# Patient Record
Sex: MALE | Race: WHITE | Employment: UNEMPLOYED | URBAN - METROPOLITAN AREA
[De-identification: names, ages, dates, MRNs, and addresses within clinical notes are randomized per-mention and may not be internally consistent; named-entity substitution may affect disease eponyms.]

---

## 2016-09-09 LAB — COLONOSCOPY, EXTERNAL: NORMAL

## 2017-01-06 DIAGNOSIS — I10 ESSENTIAL HYPERTENSION, BENIGN: ICD-10-CM

## 2017-01-06 RX ORDER — FUROSEMIDE 20 MG/1
20 TABLET ORAL DAILY
Qty: 90 TAB | Refills: 1 | Status: SHIPPED | OUTPATIENT
Start: 2017-01-06 | End: 2017-07-08 | Stop reason: SDUPTHER

## 2017-01-27 ENCOUNTER — HOSPITAL ENCOUNTER (EMERGENCY)
Age: 52
Discharge: HOME | End: 2017-01-27
Attending: EMERGENCY MEDICINE
Payer: MEDICARE

## 2017-01-27 ENCOUNTER — APPOINTMENT (OUTPATIENT)
Dept: CT IMAGING | Age: 52
End: 2017-01-27
Attending: EMERGENCY MEDICINE
Payer: MEDICARE

## 2017-01-27 VITALS
DIASTOLIC BLOOD PRESSURE: 89 MMHG | OXYGEN SATURATION: 98 % | BODY MASS INDEX: 24.88 KG/M2 | TEMPERATURE: 97.7 F | HEART RATE: 78 BPM | WEIGHT: 168 LBS | SYSTOLIC BLOOD PRESSURE: 133 MMHG | RESPIRATION RATE: 16 BRPM | HEIGHT: 69 IN

## 2017-01-27 DIAGNOSIS — M54.2 NECK PAIN: Primary | ICD-10-CM

## 2017-01-27 PROCEDURE — 72125 CT NECK SPINE W/O DYE: CPT

## 2017-01-27 PROCEDURE — 99283 EMERGENCY DEPT VISIT LOW MDM: CPT

## 2017-01-27 RX ORDER — HYDROCODONE BITARTRATE AND ACETAMINOPHEN 5; 325 MG/1; MG/1
1 TABLET ORAL
Qty: 20 TAB | Refills: 0 | Status: SHIPPED | OUTPATIENT
Start: 2017-01-27 | End: 2017-08-10 | Stop reason: ALTCHOICE

## 2017-01-27 RX ORDER — PREDNISONE 10 MG/1
TABLET ORAL
Qty: 21 TAB | Refills: 0 | Status: SHIPPED | OUTPATIENT
Start: 2017-01-27 | End: 2017-05-05 | Stop reason: ALTCHOICE

## 2017-01-27 RX ORDER — DULOXETIN HYDROCHLORIDE 30 MG/1
30 CAPSULE, DELAYED RELEASE ORAL
COMMUNITY
End: 2017-01-27

## 2017-01-27 RX ORDER — QUETIAPINE FUMARATE 300 MG/1
600 TABLET, FILM COATED ORAL
COMMUNITY

## 2017-01-27 RX ORDER — PRAVASTATIN SODIUM 40 MG/1
40 TABLET ORAL
COMMUNITY
End: 2018-02-05 | Stop reason: SDUPTHER

## 2017-01-27 RX ORDER — QUETIAPINE FUMARATE 200 MG/1
200 TABLET, FILM COATED ORAL
COMMUNITY

## 2017-01-27 NOTE — ED TRIAGE NOTES
Sitting in bed and herd a \"crunch\" and now says all his muscles are \"buldging\". No loss of bladder or bowel control.

## 2017-01-27 NOTE — DISCHARGE INSTRUCTIONS
We hope that we have addressed all of your medical concerns. The examination and treatment you received in the Emergency Department were for an emergent problem and were not intended as complete care. It is important that you follow up with your healthcare provider(s) for ongoing care. If your symptoms worsen or do not improve as expected, and you are unable to reach your usual health care provider(s), you should return to the Emergency Department. Today's healthcare is undergoing tremendous change, and patient satisfaction surveys are one of the many tools to assess the quality of medical care. You may receive a survey from the [employer] regarding your experience in the Emergency Department. I hope that your experience has been completely positive, particularly the medical care that I provided. As such, please participate in the survey; anything less than excellent does not meet my expectations or intentions. [facility] and [address] [place] [address] participate in nationally recognized quality of care measures. If your blood pressure is greater than 120/80, as reported below, we urge that you seek medical care to address the potential of high blood pressure, commonly known as hypertension. Hypertension can be hereditary or can be caused by certain medical conditions, pain, stress, or \"white coat syndrome. \"       Please make an appointment with your health care provider(s) for follow up of your Emergency Department visit. VITALS:   Patient Vitals for the past 8 hrs:   Temp Pulse Resp BP SpO2   01/27/17 1152 97.7 °F (36.5 °C) 79 16 104/76 100 %          Thank you for allowing us to provide you with medical care today. We realize that you have many choices for your emergency care needs. Please choose us in the future for any continued health care needs. Peri Alanis Laughter, [address] Office: [phone]            No results found for this or any previous visit (from the past 24 hour(s)). Ct Spine Cerv Wo Cont    Result Date: 1/27/2017  EXAM:  CT CERVICAL SPINE WITHOUT CONTRAST INDICATION:   acute neck pain with hx of neck surgery. COMPARISON: None. TECHNIQUE: Multislice helical CT of the cervical spine was performed without intravenous contrast administration. Sagittal and coronal reconstructions were generated. CT dose reduction was achieved through use of a standardized protocol tailored for this examination and automatic exposure control for dose modulation. CONTRAST: None. FINDINGS: The alignment is within normal limits. There is no fracture or subluxation. The odontoid process is intact. The craniocervical junction is within normal limits. The prevertebral soft tissues are within normal limits. Surgical fusion of C5 and C6 is noted. Spondylosis is noted at C4-5 and C6-7. No spinal stenosis. IMPRESSION: Spondylitic and postoperative changes without acute abnormality.

## 2017-01-27 NOTE — ED PROVIDER NOTES
HPI Comments: 46 y.o. male with past medical history significant for reflux sympathetic dystrophy, seizures. Chronic pain, MI, cervical spondylosis, chronic pain, CAD, HTN, major depression and anxiety who presents from home with chief complaint of neck pain. Per pt, her was sitting in bed last night (1/27/2017) when he heard a loud, \"crunching sound\" in his neck which followed with moderate severe pain. He notes having a hx of a ruptured C3 disk as well as a pinched nerve to his neck  following an accident which occurred in [address] [place] [address] while he was working on his car. While in the ED the pt rates his pain 9/10. He adds that he has noticed his bilateral UE as well LE appear more swollen per usual. The pt denies any recent injury to his neck or GLF. There are no other acute medical concerns at this time. Social hx: Current smoker, No ETOH use    PCP: Nickolas You MD    Note written by Tyrone Albarran, as dictated by Nia Wan MD 12:08 PM        The history is provided by the patient.         Past Medical History:   Diagnosis Date    CAD (coronary artery disease) 2003     MI    Cervical spondylosis 6/26/2014    Chronic pain     Chronic pain      Left Shoulder pain    Hypertension     MI (myocardial infarction) [facility])      age 38    Other ill-defined conditions(799.89)      reflux sympathetic dystrophy    Psychiatric disorder      Major Depression/Anxiety    Psychotic disorder     Seizures ([address]        Past Surgical History:   Procedure Laterality Date    Hx orthopaedic       replacement of C3    Hx orthopaedic       C-Spine herniated disc repair (C3)    Colonoscopy N/A 9/9/2016     COLONOSCOPY performed by Juan J Cummins MD at [facility] ENDOSCOPY         Family History:   Problem Relation Age of Onset    Hypertension Mother     Heart Disease Mother     Stroke Mother        Social History     Social History    Marital status:      Spouse name: N/A    Number of children: N/A    Years of education: N/A     Occupational History    Not on file. Social History Main Topics    Smoking status: Current Every Day Smoker     Packs/day: 1.00    Smokeless tobacco: Not on file    Alcohol use No    Drug use: No    Sexual activity: Not on file     Other Topics Concern    Not on file     Social History Narrative    ** Merged History Encounter **              ALLERGIES: Bee sting [sting, bee]; Demerol [meperidine]; Bee sting [sting, bee]; and Demerol [meperidine]    Review of Systems   Musculoskeletal: Positive for neck pain. Bilateral UE and LE swelling       Vitals:    01/27/17 1152   BP: 104/76   Pulse: 79   Resp: 16   Temp: 97.7 °F (36.5 °C)   SpO2: 100%   Weight: 76.2 kg (168 lb)   Height: 5' 9\" (1.753 m)            Physical Exam   Constitutional: He is oriented to person, place, and time. He appears well-developed and well-nourished. No distress. HENT:   Head: Normocephalic and atraumatic. Mouth/Throat: Oropharynx is clear and moist.   Eyes: Conjunctivae and EOM are normal. Pupils are equal, round, and reactive to light. Neck: Muscular tenderness present. Decreased range of motion present. No erythema present. Cardiovascular: Normal rate, regular rhythm, normal heart sounds and intact distal pulses. No murmur heard. Pulmonary/Chest: Effort normal and breath sounds normal. No stridor. No respiratory distress. Abdominal: Soft. Bowel sounds are normal. There is no tenderness. Musculoskeletal: He exhibits no edema or tenderness. Neurological: He is alert and oriented to person, place, and time. No cranial nerve deficit. Skin: Skin is warm and dry. He is not diaphoretic. Psychiatric: He has a normal mood and affect. Nursing note and vitals reviewed.        MDM  Number of Diagnoses or Management Options  Neck pain:   Diagnosis management comments: Patient with neck pain s/p minor injury - with hx of minor neck injury requiring surgery for disc injury, check CT of cervical spine for acute process. CT neg for acute process - refer to PCP for further eval and care       Amount and/or Complexity of Data Reviewed  Tests in the radiology section of CPT®: reviewed and ordered      ED Course       Procedures      Final result (Exam End: 1/27/2017  1:19 PM) Open        Study Result      EXAM: CT CERVICAL SPINE WITHOUT CONTRAST     INDICATION: acute neck pain with hx of neck surgery.     COMPARISON: None.     TECHNIQUE: Multislice helical CT of the cervical spine was performed without  intravenous contrast administration. Sagittal and coronal reconstructions were  generated. CT dose reduction was achieved through use of a standardized  protocol tailored for this examination and automatic exposure control for dose  modulation.      CONTRAST: None.     FINDINGS:     The alignment is within normal limits. There is no fracture or subluxation. The  odontoid process is intact. The craniocervical junction is within normal limits. The prevertebral soft tissues are within normal limits. Surgical fusion of C5  and C6 is noted. Spondylosis is noted at C4-5 and C6-7.  No spinal stenosis.     IMPRESSION  IMPRESSION:  Spondylitic and postoperative changes without acute abnormality.

## 2017-01-31 ENCOUNTER — HOSPITAL ENCOUNTER (OUTPATIENT)
Dept: LAB | Age: 52
Discharge: HOME | End: 2017-01-31
Payer: MEDICARE

## 2017-01-31 ENCOUNTER — OFFICE VISIT (OUTPATIENT)
Dept: FAMILY MEDICINE CLINIC | Age: 52
End: 2017-01-31

## 2017-01-31 VITALS
BODY MASS INDEX: 24.44 KG/M2 | HEART RATE: 62 BPM | DIASTOLIC BLOOD PRESSURE: 85 MMHG | HEIGHT: 69 IN | WEIGHT: 165 LBS | SYSTOLIC BLOOD PRESSURE: 135 MMHG | RESPIRATION RATE: 18 BRPM | OXYGEN SATURATION: 98 % | TEMPERATURE: 98.2 F

## 2017-01-31 DIAGNOSIS — M54.2 NECK PAIN: Primary | ICD-10-CM

## 2017-01-31 DIAGNOSIS — M50.30 DEGENERATIVE CERVICAL DISC: ICD-10-CM

## 2017-01-31 DIAGNOSIS — F25.0 SCHIZOAFFECTIVE DISORDER, BIPOLAR TYPE (HCC): ICD-10-CM

## 2017-01-31 DIAGNOSIS — I10 ESSENTIAL HYPERTENSION, BENIGN: ICD-10-CM

## 2017-01-31 DIAGNOSIS — R73.03 PRE-DIABETES: ICD-10-CM

## 2017-01-31 PROCEDURE — 85025 COMPLETE CBC W/AUTO DIFF WBC: CPT

## 2017-01-31 PROCEDURE — 80053 COMPREHEN METABOLIC PANEL: CPT

## 2017-01-31 PROCEDURE — 83036 HEMOGLOBIN GLYCOSYLATED A1C: CPT

## 2017-01-31 PROCEDURE — 84443 ASSAY THYROID STIM HORMONE: CPT

## 2017-01-31 RX ORDER — PREDNISONE 10 MG/1
TABLET ORAL
Qty: 1 PACKAGE | Refills: 0 | Status: SHIPPED | OUTPATIENT
Start: 2017-01-31 | End: 2017-05-05 | Stop reason: ALTCHOICE

## 2017-01-31 NOTE — PROGRESS NOTES
1. Have you been to the ER, urgent care clinic since your last visit? Hospitalized since your last visit? No    2. Have you seen or consulted any other health care providers outside of the [address] [place] [address] since your last visit? Include any pap smears or colon screening. Yes [facility] ER X 4 days - muscle pains      Chief Complaint   Patient presents with   [facility] Follow Up     [address] ER- X 4 days - muscle pain- tightness remains per pt       Chief Complaint   Patient presents with   [facility] Follow Up     [address] ER- X 4 days - muscle pain- tightness remains per pt     he is a 46y.o. year old male who presents for evalution. Reviewed PmHx, RxHx, FmHx, SocHx, AllgHx and updated and dated in the chart. Patient Active Problem List    Diagnosis    Essential hypertension, benign    Right hand pain    Pain in both feet    Peripheral neuropathy (HCC)    Idiopathic peripheral neuropathy    Chronic neck pain    Cervical spondylosis    Degenerative cervical disc    Pre-diabetes    Hyperlipidemia    History of MI (myocardial infarction)    RSD (reflex sympathetic dystrophy)    Paranoia (psychosis) ([address]    Delusions of persecution    Schizoaffective disorder ([address]       Review of Systems - negative except as listed above in the HPI    Objective:     Vitals:    01/31/17 1358   BP: 135/85   Pulse: 62   Resp: 18   Temp: 98.2 °F (36.8 °C)   SpO2: 98%   Weight: 165 lb (74.8 kg)   Height: 5' 9\" (1.753 m)     Physical Examination: General appearance - alert, well appearing, and in no distress  Musculoskeletal - neck tender to palp and rom        Assessment/ Plan:   [facility] was seen today for hospital follow up.     Diagnoses and all orders for this visit:    Neck pain  -     predniSONE (STERAPRED DS) 10 mg dose pack; 12 day DS taper pack as directed  -add rx  -refer if not better  -cont baclofen    Essential hypertension, benign  -     METABOLIC PANEL, COMPREHENSIVE  -     CBC WITH AUTOMATED DIFF  -     TSH 3RD GENERATION  -at goal    Schizoaffective disorder, bipolar type (HCC)  -stable    Pre-diabetes  -     METABOLIC PANEL, COMPREHENSIVE  -     HEMOGLOBIN A1C WITH EAG    Degenerative cervical disc  -as above     Follow-up Disposition:  Return if symptoms worsen or fail to improve. I have discussed the diagnosis with the patient and the intended plan as seen in the above orders. The patient understands and agrees with the plan. The patient has received an after-visit summary and questions were answered concerning future plans. Medication Side Effects and Warnings were discussed with patient  Patient Labs were reviewed and or requested:  Patient Past Records were reviewed and or requested    Karmen Marin M.D. There are no Patient Instructions on file for this visit.

## 2017-01-31 NOTE — MR AVS SNAPSHOT
Visit Information Date & Time Provider Department Dept. Phone Encounter #  
 1/31/2017  1:40 PM Tammy Moses MD [address] [facility] [phone] [id number] Follow-up Instructions Return if symptoms worsen or fail to improve. Upcoming Health Maintenance Date Due Hepatitis C Screening 1965 Pneumococcal 19-64 Medium Risk (1 of 1 - PPSV23) 9/15/1982 DTaP/Tdap/Td series (1 - Tdap) 9/15/1984 FOBT Q 1 YEAR AGE 50-75 9/18/2015 Allergies as of 1/31/2017  Review Complete On: 1/31/2017 By: Tammy Moses MD  
  
 Severity Noted Reaction Type Reactions Bee Sting [Sting, Bee] High 09/07/2016    Anaphylaxis Demerol [Meperidine] High 09/07/2016    Anaphylaxis Bee Sting [Sting, Bee]  09/02/2011    Hives Demerol [Meperidine]  08/13/2010    Palpitations Current Immunizations  Reviewed on 9/18/2014 Name Date Influenza Vaccine Split 10/27/2011 Not reviewed this visit You Were Diagnosed With   
  
 Codes Comments Neck pain    -  Primary ICD-10-CM: M54.2 ICD-9-CM: 723.1 Essential hypertension, benign     ICD-10-CM: I10 
ICD-9-CM: 401.1 Schizoaffective disorder, bipolar type ([facility]     ICD-10-CM: F25.0 ICD-9-CM: 295.70 Pre-diabetes     ICD-10-CM: R73.03 
ICD-9-CM: 790.29 Degenerative cervical disc     ICD-10-CM: M50.30 ICD-9-CM: 722.4 Vitals BP Pulse Temp Resp Height(growth percentile) Weight(growth percentile) 135/85 (BP 1 Location: Left arm, BP Patient Position: Sitting) 62 98.2 °F (36.8 °C) 18 5' 9\" (1.753 m) 165 lb (74.8 kg) SpO2 BMI Smoking Status 98% 24.37 kg/m2 Current Every Day Smoker Vitals History BMI and BSA Data Body Mass Index Body Surface Area  
 24.37 kg/m 2 1.91 m 2 Preferred Pharmacy Pharmacy Name Phone [address] [phone] Your Updated Medication List  
  
   
This list is accurate as of: 17  2:36 PM.  Always use your most recent med list.  
  
  
  
  
 baclofen 20 mg tablet Commonly known as:  LIORESAL Take 1 Tab by mouth two (2) times a day. DULoxetine 60 mg capsule Commonly known as:  CYMBALTA Take 1 Cap by mouth two (2) times a day. EPINEPHrine 0.3 mg/0.3 mL injection Commonly known as:  EPIPEN  
0.3 mL by IntraMUSCular route once as needed. furosemide 20 mg tablet Commonly known as:  LASIX Take 1 Tab by mouth daily. HYDROcodone-acetaminophen 5-325 mg per tablet Commonly known as:  Nani Yeager Take 1 Tab by mouth every four (4) hours as needed for Pain. Max Daily Amount: 6 Tabs. Polyethylene Glycol 3350 Powd 1 Cap by Does Not Apply route daily. potassium chloride 20 mEq tablet Commonly known as:  K-DUR, KLOR-CON Take 1 Tab by mouth daily. PRAVACHOL 40 mg tablet Generic drug:  pravastatin Take 40 mg by mouth nightly. * predniSONE 10 mg dose pack Commonly known as:  STERAPRED DS Take as directed on package * predniSONE 10 mg dose pack Commonly known as:  STERAPRED DS  
12 day DS taper pack as directed  
  
 propranolol 20 mg tablet Commonly known as:  INDERAL Take 1 Tab by mouth two (2) times a day. * QUEtiapine 300 mg tablet Commonly known as:  SEROquel Take 600 mg by mouth nightly. * QUEtiapine 200 mg tablet Commonly known as:  SEROquel Take 200 mg by mouth every morning. * Notice: This list has 4 medication(s) that are the same as other medications prescribed for you. Read the directions carefully, and ask your doctor or other care provider to review them with you. Prescriptions Sent to Pharmacy Refills  
 predniSONE (STERAPRED DS) 10 mg dose pack 0 Si day DS taper pack as directed  Class: Normal  
 Pharmacy: [facility] [address] [place] [address] #: [phone] We Performed the Following CBC WITH AUTOMATED DIFF [14106 CPT(R)] HEMOGLOBIN A1C WITH EAG [80113 CPT(R)] METABOLIC PANEL, COMPREHENSIVE [25178 CPT(R)] TSH 3RD GENERATION [43407 CPT(R)] Follow-up Instructions Return if symptoms worsen or fail to improve. Introducing [facility]! [facility] introduces Reissued patient portal. Now you can access parts of your medical record, email your doctor's office, and request medication refills online. 1. In your internet browser, go to [email or web address] 2. Click on the First Time User? Click Here link in the Sign In box. You will see the New Member Sign Up page. 3. Enter your Reissued Access Code exactly as it appears below. You will not need to use this code after youve completed the sign-up process. If you do not sign up before the expiration date, you must request a new code. · Reissued Access Code: [email or web address] Expires: 4/27/2017 12:07 PM 
 
4. Enter the last four digits of your Social Security Number (xxxx) and Date of Birth (mm/dd/yyyy) as indicated and click Submit. You will be taken to the next sign-up page. 5. Create a Reissued ID. This will be your Reissued login ID and cannot be changed, so think of one that is secure and easy to remember. 6. Create a Reissued password. You can change your password at any time. 7. Enter your Password Reset Question and Answer. This can be used at a later time if you forget your password. 8. Enter your e-mail address. You will receive e-mail notification when new information is available in [address] 9. Click Sign Up. You can now view and download portions of your medical record. 10. Click the Download Summary menu link to download a portable copy of your medical information.  
 
If you have questions, please visit the Frequently Asked Questions section of the [employer]. Remember, [employer] is NOT to be used for urgent needs. For medical emergencies, dial 911. Now available from your iPhone and Android! Please provide this summary of care documentation to your next provider. Your primary care clinician is listed as CECILIA MERLOS. If you have any questions after today's visit, please call [phone].

## 2017-02-01 ENCOUNTER — PATIENT OUTREACH (OUTPATIENT)
Dept: FAMILY MEDICINE CLINIC | Age: 52
End: 2017-02-01

## 2017-02-01 LAB
ALBUMIN SERPL-MCNC: 4.9 G/DL (ref 3.5–5.5)
ALBUMIN/GLOB SERPL: 1.7 {RATIO} (ref 1.1–2.5)
ALP SERPL-CCNC: 128 IU/L (ref 39–117)
ALT SERPL-CCNC: 19 IU/L (ref 0–44)
AST SERPL-CCNC: 13 IU/L (ref 0–40)
BASOPHILS # BLD AUTO: 0 X10E3/UL (ref 0–0.2)
BASOPHILS NFR BLD AUTO: 0 %
BILIRUB SERPL-MCNC: <0.2 MG/DL (ref 0–1.2)
BUN SERPL-MCNC: 22 MG/DL (ref 6–24)
BUN/CREAT SERPL: 20 (ref 9–20)
CALCIUM SERPL-MCNC: 9.6 MG/DL (ref 8.7–10.2)
CHLORIDE SERPL-SCNC: 100 MMOL/L (ref 96–106)
CO2 SERPL-SCNC: 22 MMOL/L (ref 18–29)
CREAT SERPL-MCNC: 1.1 MG/DL (ref 0.76–1.27)
EOSINOPHIL # BLD AUTO: 0 X10E3/UL (ref 0–0.4)
EOSINOPHIL NFR BLD AUTO: 0 %
ERYTHROCYTE [DISTWIDTH] IN BLOOD BY AUTOMATED COUNT: 14.7 % (ref 12.3–15.4)
EST. AVERAGE GLUCOSE BLD GHB EST-MCNC: 157 MG/DL
GLOBULIN SER CALC-MCNC: 2.9 G/DL (ref 1.5–4.5)
GLUCOSE SERPL-MCNC: 113 MG/DL (ref 65–99)
HBA1C MFR BLD: 7.1 % (ref 4.8–5.6)
HCT VFR BLD AUTO: 46.3 % (ref 37.5–51)
HGB BLD-MCNC: 16.2 G/DL (ref 12.6–17.7)
IMM GRANULOCYTES # BLD: 0 X10E3/UL (ref 0–0.1)
IMM GRANULOCYTES NFR BLD: 0 %
LYMPHOCYTES # BLD AUTO: 2.7 X10E3/UL (ref 0.7–3.1)
LYMPHOCYTES NFR BLD AUTO: 20 %
MCH RBC QN AUTO: 32.7 PG (ref 26.6–33)
MCHC RBC AUTO-ENTMCNC: 35 G/DL (ref 31.5–35.7)
MCV RBC AUTO: 93 FL (ref 79–97)
MONOCYTES # BLD AUTO: 0.7 X10E3/UL (ref 0.1–0.9)
MONOCYTES NFR BLD AUTO: 6 %
NEUTROPHILS # BLD AUTO: 9.6 X10E3/UL (ref 1.4–7)
NEUTROPHILS NFR BLD AUTO: 74 %
PLATELET # BLD AUTO: 194 X10E3/UL (ref 150–379)
POTASSIUM SERPL-SCNC: 4.4 MMOL/L (ref 3.5–5.2)
PROT SERPL-MCNC: 7.8 G/DL (ref 6–8.5)
RBC # BLD AUTO: 4.96 X10E6/UL (ref 4.14–5.8)
SODIUM SERPL-SCNC: 142 MMOL/L (ref 134–144)
TSH SERPL DL<=0.005 MIU/L-ACNC: 0.87 UIU/ML (ref 0.45–4.5)
WBC # BLD AUTO: 13 X10E3/UL (ref 3.4–10.8)

## 2017-02-01 NOTE — PROGRESS NOTES
Spoke to pt to review lab results. Hemoglobin A1c elevated. Pt does not want to start metformin at this time. I will give him 3 months to work on diet and exercise and then re check labs. Pt understands and agrees with plan.

## 2017-02-02 ENCOUNTER — TELEPHONE (OUTPATIENT)
Dept: FAMILY MEDICINE CLINIC | Age: 52
End: 2017-02-02

## 2017-02-02 DIAGNOSIS — E11.9 TYPE 2 DIABETES MELLITUS WITHOUT COMPLICATION, WITHOUT LONG-TERM CURRENT USE OF INSULIN (HCC): Primary | ICD-10-CM

## 2017-02-02 RX ORDER — METFORMIN HYDROCHLORIDE 500 MG/1
500 TABLET, EXTENDED RELEASE ORAL
Qty: 30 TAB | Refills: 2 | Status: SHIPPED | OUTPATIENT
Start: 2017-02-02 | End: 2017-05-04 | Stop reason: SDUPTHER

## 2017-02-02 RX ORDER — INSULIN PUMP SYRINGE, 3 ML
EACH MISCELLANEOUS
Qty: 1 KIT | Refills: 0 | Status: SHIPPED | OUTPATIENT
Start: 2017-02-02 | End: 2021-03-09 | Stop reason: SDUPTHER

## 2017-02-02 RX ORDER — LANCETS
EACH MISCELLANEOUS
Qty: 1 EACH | Refills: 11 | Status: SHIPPED | OUTPATIENT
Start: 2017-02-02 | End: 2018-02-05 | Stop reason: SDUPTHER

## 2017-02-02 NOTE — TELEPHONE ENCOUNTER
Returned pt phone call. Called in metformin 500mg once a day. Discussed moa and potential side effects.

## 2017-02-02 NOTE — TELEPHONE ENCOUNTER
[facility] pharmacy needs prescriptions for supply to go with the meter sent to pharmacy.

## 2017-02-16 ENCOUNTER — DOCUMENTATION ONLY (OUTPATIENT)
Dept: FAMILY MEDICINE CLINIC | Age: 52
End: 2017-02-16

## 2017-02-27 ENCOUNTER — TELEPHONE (OUTPATIENT)
Dept: FAMILY MEDICINE CLINIC | Age: 52
End: 2017-02-27

## 2017-02-27 ENCOUNTER — DOCUMENTATION ONLY (OUTPATIENT)
Dept: FAMILY MEDICINE CLINIC | Age: 52
End: 2017-02-27

## 2017-02-27 NOTE — TELEPHONE ENCOUNTER
TP #4 NC-B  Spoke w/ pt as part of [facility] Outreach program.     At last ov, pt started on metformin. Pt reports no side effects from medication. Pt has made changes to diet. He has eliminated sweetened beverages and watching the sugars and carbs in his diet. Pt has not been exercising. Recommended pt walk 20-30 minutes 4-5 days a week. BP at goal  LDL not at goal-cont pravastatin     Pt needs eye exam and urine micro     Discussed with patient goal of Diabetes to include: HgA1C <7, LDL cholesterol <100, Blood pressure <140/80. Discussed with patient diet and weight management and to get regular exercise. Recommend yearly eye exams and daily foot care. The patient understands and agrees with the plan.

## 2017-03-01 ENCOUNTER — DOCUMENTATION ONLY (OUTPATIENT)
Dept: FAMILY MEDICINE CLINIC | Age: 52
End: 2017-03-01

## 2017-03-01 NOTE — PROGRESS NOTES
Status: Signed          [facility] diabetic form was completed and faxed to [phone], confirmed, scanned

## 2017-03-09 ENCOUNTER — DOCUMENTATION ONLY (OUTPATIENT)
Dept: FAMILY MEDICINE CLINIC | Age: 52
End: 2017-03-09

## 2017-03-09 NOTE — PROGRESS NOTES
[facility] and faxed to [phone], confirmed, scanned

## 2017-03-29 ENCOUNTER — TELEPHONE (OUTPATIENT)
Dept: FAMILY MEDICINE CLINIC | Age: 52
End: 2017-03-29

## 2017-03-29 NOTE — TELEPHONE ENCOUNTER
TP #3 NC-SALUD  Spoke w/ pt as part of [facility] Outreach program.     Pt tolerating metformin. He is compliant with medications. He does not check bs outside of the office. Pt has not been exercising. He does stay active throughout the day. Pt states he has cut back on portion sizes significantly. He is trying to eat less than 2000 calories/day. He has noticed some weight loss but has not been tracking. Encouraged pt to make an appointment for fasting labs in 1 month. Discussed with patient goal of Diabetes to include: HgA1C <7, LDL cholesterol <100, Blood pressure <140/80. Discussed with patient diet and weight management and to get regular exercise. Recommend yearly eye exams and daily foot care. The patient understands and agrees with the plan.

## 2017-03-31 ENCOUNTER — PATIENT OUTREACH (OUTPATIENT)
Dept: FAMILY MEDICINE CLINIC | Age: 52
End: 2017-03-31

## 2017-03-31 NOTE — PROGRESS NOTES
[address] [facility]  Nursing Note  [phone]  Fax [phone]    Patient Name: Pebbles Raphael  YOB: 1965    [facility] ED/1/27/17 for back pain. He has kept his follow up appts and has not had any further issues at the present time. Resolving post 30 day hospital episode. Currently Participating in the diabetes outreach program at [facility].

## 2017-04-12 DIAGNOSIS — I10 ESSENTIAL HYPERTENSION, BENIGN: ICD-10-CM

## 2017-04-13 RX ORDER — POTASSIUM CHLORIDE 20 MEQ/1
20 TABLET, EXTENDED RELEASE ORAL DAILY
Qty: 90 TAB | Refills: 1 | Status: SHIPPED | OUTPATIENT
Start: 2017-04-13 | End: 2017-08-10 | Stop reason: SDUPTHER

## 2017-04-26 ENCOUNTER — TELEPHONE (OUTPATIENT)
Dept: FAMILY MEDICINE CLINIC | Age: 52
End: 2017-04-26

## 2017-05-04 DIAGNOSIS — E11.9 TYPE 2 DIABETES MELLITUS WITHOUT COMPLICATION, WITHOUT LONG-TERM CURRENT USE OF INSULIN (HCC): ICD-10-CM

## 2017-05-04 RX ORDER — METFORMIN HYDROCHLORIDE 500 MG/1
TABLET, EXTENDED RELEASE ORAL
Qty: 30 TAB | Refills: 0 | Status: SHIPPED | OUTPATIENT
Start: 2017-05-04 | End: 2017-05-05 | Stop reason: SDUPTHER

## 2017-05-05 ENCOUNTER — OFFICE VISIT (OUTPATIENT)
Dept: FAMILY MEDICINE CLINIC | Age: 52
End: 2017-05-05

## 2017-05-05 ENCOUNTER — HOSPITAL ENCOUNTER (OUTPATIENT)
Dept: LAB | Age: 52
Discharge: HOME | End: 2017-05-05
Payer: MEDICARE

## 2017-05-05 VITALS
RESPIRATION RATE: 12 BRPM | OXYGEN SATURATION: 94 % | HEIGHT: 69 IN | BODY MASS INDEX: 23.85 KG/M2 | DIASTOLIC BLOOD PRESSURE: 89 MMHG | TEMPERATURE: 98.4 F | SYSTOLIC BLOOD PRESSURE: 169 MMHG | WEIGHT: 161 LBS | HEART RATE: 76 BPM

## 2017-05-05 DIAGNOSIS — E78.00 PURE HYPERCHOLESTEROLEMIA: ICD-10-CM

## 2017-05-05 DIAGNOSIS — G89.29 CHRONIC NECK PAIN: ICD-10-CM

## 2017-05-05 DIAGNOSIS — E11.9 TYPE 2 DIABETES MELLITUS WITHOUT COMPLICATION, WITHOUT LONG-TERM CURRENT USE OF INSULIN (HCC): Primary | ICD-10-CM

## 2017-05-05 DIAGNOSIS — M54.2 CHRONIC NECK PAIN: ICD-10-CM

## 2017-05-05 DIAGNOSIS — I10 ESSENTIAL HYPERTENSION, BENIGN: ICD-10-CM

## 2017-05-05 DIAGNOSIS — E11.9 TYPE 2 DIABETES MELLITUS WITHOUT COMPLICATION, WITHOUT LONG-TERM CURRENT USE OF INSULIN (HCC): ICD-10-CM

## 2017-05-05 PROCEDURE — 83036 HEMOGLOBIN GLYCOSYLATED A1C: CPT

## 2017-05-05 PROCEDURE — 82043 UR ALBUMIN QUANTITATIVE: CPT

## 2017-05-05 PROCEDURE — 80053 COMPREHEN METABOLIC PANEL: CPT

## 2017-05-05 PROCEDURE — 80061 LIPID PANEL: CPT

## 2017-05-05 RX ORDER — METFORMIN HYDROCHLORIDE 500 MG/1
TABLET, EXTENDED RELEASE ORAL
Qty: 30 TAB | Refills: 3 | Status: SHIPPED | OUTPATIENT
Start: 2017-05-05 | End: 2017-08-10 | Stop reason: SDUPTHER

## 2017-05-05 RX ORDER — ACETAMINOPHEN 325 MG/1
325 TABLET ORAL
Qty: 100 TAB | Refills: 3 | Status: SHIPPED | OUTPATIENT
Start: 2017-05-05 | End: 2018-08-23 | Stop reason: ALTCHOICE

## 2017-05-05 RX ORDER — ACETAMINOPHEN 325 MG/1
TABLET ORAL
COMMUNITY
End: 2017-05-05 | Stop reason: SDUPTHER

## 2017-05-05 RX ORDER — BACLOFEN 20 MG/1
20 TABLET ORAL 3 TIMES DAILY
Qty: 60 TAB | Refills: 1 | Status: SHIPPED | OUTPATIENT
Start: 2017-05-05 | End: 2017-07-30 | Stop reason: SDUPTHER

## 2017-05-05 NOTE — PATIENT INSTRUCTIONS

## 2017-05-05 NOTE — MR AVS SNAPSHOT
Visit Information Date & Time Provider Department Dept. Phone Encounter #  
 5/5/2017  1:00 PM Javan Zhang NP [address] [facility] [phone] [id number] Follow-up Instructions Return in about 2 weeks (around 5/19/2017), or if symptoms worsen or fail to improve, for BP check . Upcoming Health Maintenance Date Due Hepatitis C Screening 1965 FOOT EXAM Q1 9/15/1973 MICROALBUMIN Q1 9/15/1973 EYE EXAM RETINAL OR DILATED Q1 9/15/1973 Pneumococcal 19-64 Medium Risk (1 of 1 - PPSV23) 9/15/1982 DTaP/Tdap/Td series (1 - Tdap) 9/15/1984 FOBT Q 1 YEAR AGE 50-75 9/18/2015 LIPID PANEL Q1 7/11/2017 HEMOGLOBIN A1C Q6M 7/31/2017 INFLUENZA AGE 9 TO ADULT 8/1/2017 Allergies as of 5/5/2017  Review Complete On: 5/5/2017 By: Javan Zhang NP Severity Noted Reaction Type Reactions Bee Sting [Sting, Bee] High 09/07/2016    Anaphylaxis Demerol [Meperidine] High 09/07/2016    Anaphylaxis Bee Sting [Sting, Bee]  09/02/2011    Hives Demerol [Meperidine]  08/13/2010    Palpitations Current Immunizations  Reviewed on 9/18/2014 Name Date Influenza Vaccine Split 10/27/2011 Not reviewed this visit You Were Diagnosed With   
  
 Codes Comments Type 2 diabetes mellitus without complication, without long-term current use of insulin (HCC)    -  Primary ICD-10-CM: E11.9 ICD-9-CM: 250.00 Essential hypertension, benign     ICD-10-CM: I10 
ICD-9-CM: 208. 1 Chronic neck pain     ICD-10-CM: M54.2, G89.29 ICD-9-CM: 723.1, 338.29 Vitals BP Pulse Temp Resp Height(growth percentile) Weight(growth percentile) 169/89 76 98.4 °F (36.9 °C) 12 5' 9\" (1.753 m) 161 lb (73 kg) SpO2 BMI Smoking Status 94% 23.78 kg/m2 Current Every Day Smoker Vitals History BMI and BSA Data Body Mass Index Body Surface Area 23.78 kg/m 2 1.89 m 2 Preferred Pharmacy Pharmacy Name Phone [address]  [phone] Your Updated Medication List  
  
   
This list is accurate as of: 5/5/17  1:54 PM.  Always use your most recent med list.  
  
  
  
  
 acetaminophen 325 mg tablet Commonly known as:  TYLENOL Take  by mouth every four (4) hours as needed for Pain. baclofen 20 mg tablet Commonly known as:  LIORESAL Take 1 Tab by mouth three (3) times daily. Blood-Glucose Meter monitoring kit Check BS once a day. DULoxetine 60 mg capsule Commonly known as:  CYMBALTA Take 1 Cap by mouth two (2) times a day. EPINEPHrine 0.3 mg/0.3 mL injection Commonly known as:  EPIPEN  
0.3 mL by IntraMUSCular route once as needed. furosemide 20 mg tablet Commonly known as:  LASIX Take 1 Tab by mouth daily. glucose blood VI test strips strip Commonly known as:  FREESTYLE TEST Check bs one a day. HYDROcodone-acetaminophen 5-325 mg per tablet Commonly known as:  [place] Take 1 Tab by mouth every four (4) hours as needed for Pain. Max Daily Amount: 6 Tabs. Lancets Misc Check bs once a day. metFORMIN  mg tablet Commonly known as:  GLUCOPHAGE XR  
TAKE 1 TABLET BY MOUTH DAILY WITH DINNER FOR DIABETES Polyethylene Glycol 3350 Powd 1 Cap by Does Not Apply route daily. potassium chloride 20 mEq tablet Commonly known as:  K-DUR, KLOR-CON Take 1 Tab by mouth daily. PRAVACHOL 40 mg tablet Generic drug:  pravastatin Take 40 mg by mouth nightly. propranolol 20 mg tablet Commonly known as:  INDERAL Take 1 Tab by mouth two (2) times a day. * QUEtiapine 300 mg tablet Commonly known as:  SEROquel Take 600 mg by mouth nightly. * QUEtiapine 200 mg tablet Commonly known as:  SEROquel Take 200 mg by mouth every morning. * Notice: This list has 2 medication(s) that are the same as other medications prescribed for you. Read the directions carefully, and ask your doctor or other care provider to review them with you. Prescriptions Sent to Pharmacy Refills  
 baclofen (LIORESAL) 20 mg tablet 1 Sig: Take 1 Tab by mouth three (3) times daily. Class: Normal  
 Pharmacy: [facility] [address] [facility] LILLIANA Babb Ph #: [phone] Route: Oral  
  
We Performed the Following HEMOGLOBIN A1C WITH EAG [03508 CPT(R)] LIPID PANEL [08743 CPT(R)] METABOLIC PANEL, COMPREHENSIVE [41776 CPT(R)] MICROALBUMIN, UR, RAND W/ MICROALBUMIN/CREA RATIO [id number] CPT(R)] Follow-up Instructions Return in about 2 weeks (around 5/19/2017), or if symptoms worsen or fail to improve, for BP check . Patient Instructions Back Stretches: Exercises Your Care Instructions Here are some examples of exercises for stretching your back. Start each exercise slowly. Ease off the exercise if you start to have pain. Your doctor or physical therapist will tell you when you can start these exercises and which ones will work best for you. How to do the exercises Overhead stretch 1. Stand comfortably with your feet shoulder-width apart. 2. Looking straight ahead, raise both arms over your head and reach toward the ceiling. Do not allow your head to tilt back. 3. Hold for 15 to 30 seconds, then lower your arms to your sides. 4. Repeat 2 to 4 times. Side stretch 1. Stand comfortably with your feet shoulder-width apart. 2. Raise one arm over your head, and then lean to the other side. 3. Slide your hand down your leg as you let the weight of your arm gently stretch your side muscles. Hold for 15 to 30 seconds. 4. Repeat 2 to 4 times on each side. Press-up 1. Lie on your stomach, supporting your body with your forearms. 2. Press your elbows down into the floor to raise your upper back. As you do this, relax your stomach muscles and allow your back to arch without using your back muscles. As your press up, do not let your hips or pelvis come off the floor. 3. Hold for 15 to 30 seconds, then relax. 4. Repeat 2 to 4 times. Relax and rest 
 
1. Lie on your back with a rolled towel under your neck and a pillow under your knees. Extend your arms comfortably to your sides. 2. Relax and breathe normally. 3. Remain in this position for about 10 minutes. 4. If you can, do this 2 or 3 times each day. Follow-up care is a key part of your treatment and safety. Be sure to make and go to all appointments, and call your doctor if you are having problems. It's also a good idea to know your test results and keep a list of the medicines you take. Where can you learn more? Go to http://celia-milton.info/. Enter O440 in the search box to learn more about \"Back Stretches: Exercises. \" Current as of: May 23, 2016 Content Version: 11.2 © 7493-6880 [employer]. Care instructions adapted under license by [employer] (which disclaims liability or warranty for this information). If you have questions about a medical condition or this instruction, always ask your healthcare professional. [address] any warranty or liability for your use of this information. Introducing [facility]! Nayana Negron introduces [employer] patient portal. Now you can access parts of your medical record, email your doctor's office, and request medication refills online. 1. In your internet browser, go to [email or web address] 2. Click on the First Time User? Click Here link in the Sign In box. You will see the New Member Sign Up page. 3. Enter your [employer] Access Code exactly as it appears below. You will not need to use this code after youve completed the sign-up process.  If you do not sign up before the expiration date, you must request a new code. · [employer] Access Code: [email or web address] Expires: 8/3/2017  1:54 PM 
 
4. Enter the last four digits of your Social Security Number (xxxx) and Date of Birth (mm/dd/yyyy) as indicated and click Submit. You will be taken to the next sign-up page. 5. Create a [employer] ID. This will be your [employer] login ID and cannot be changed, so think of one that is secure and easy to remember. 6. Create a [employer] password. You can change your password at any time. 7. Enter your Password Reset Question and Answer. This can be used at a later time if you forget your password. 8. Enter your e-mail address. You will receive e-mail notification when new information is available in [address] 9. Click Sign Up. You can now view and download portions of your medical record. 10. Click the Download Summary menu link to download a portable copy of your medical information. If you have questions, please visit the Frequently Asked Questions section of the [employer] website. Remember, [employer] is NOT to be used for urgent needs. For medical emergencies, dial 911. Now available from your iPhone and Android! Please provide this summary of care documentation to your next provider. Your primary care clinician is listed as CECILIA MERLOS. If you have any questions after today's visit, please call [phone].

## 2017-05-05 NOTE — PROGRESS NOTES
Here for Dm and cholesterol labs. Pt is here for diabetes labs. Pt compliant with meds-started on metformin at last ov. He is checking his blood sugar 1-2 times a day. This AM blood sugar was 139. He states usually around . Has worked on his diet \"some\". Pt is not exercising but stays active. He denies blurred vision, numbness/tingling in feet, polyuria, polydipsia, or polyphagia. Pt c/o chronic neck pain. Describes the pain as tightness. Pain started 1.5 years ago. He is taking baclofen as rx. Previously seeing Dr. Jordan Young (pain management) who \"got him off all pain meds\". Now taking tylenol as needed for neck and back pain. Pt continues to smoke 1 ppd. Pt is not interested in quitting at this time but feels that he can reduce. No other concerns. Chief Complaint   Patient presents with   [address] Diabetes     he is a 46y.o. year old male who presents for evalution. Reviewed PmHx, RxHx, FmHx, SocHx, AllgHx and updated and dated in the chart.     Patient Active Problem List    Diagnosis    Type 2 diabetes mellitus without complication, without long-term current use of insulin ([address]    Essential hypertension, benign    Right hand pain    Pain in both feet    Peripheral neuropathy (HCC)    Idiopathic peripheral neuropathy    Chronic neck pain    Cervical spondylosis    Degenerative cervical disc    Pre-diabetes    Hyperlipidemia    History of MI (myocardial infarction)    RSD (reflex sympathetic dystrophy)    Paranoia (psychosis) ([address]    Delusions of persecution    Schizoaffective disorder ([address]       Review of Systems - negative except as listed above in the HPI    Objective:     Vitals:    05/05/17 1318   BP: 169/89   Pulse: 76   Resp: 12   Temp: 98.4 °F (36.9 °C)   SpO2: 94%   Weight: 161 lb (73 kg)   Height: 5' 9\" (1.753 m)     Physical Examination: General appearance - alert, well appearing, and in no distress, oriented to person, place, and time and normal appearing weight  Mental status - normal mood, behavior, speech, dress, motor activity, and thought processes, anxious  Eyes - pupils equal and reactive, extraocular eye movements intact  Ears - bilateral TM's and external ear canals normal, right TM red, dull, bulging  Chest - CTA throughout all lung fields   Heart - normal rate and regular rhythm, S1 and S2 normal  Neurological - cranial nerves II through XII intact, normal muscle tone, normal gait  Musculoskeletal - no joint tenderness, deformity or swelling, neck ROM intact   Extremities - peripheral pulses normal, no pedal edema, no clubbing or cyanosis, monofilament sensory exam is normal in both feet    Assessment/ Plan:   Willian Zheng was seen today for labs and diabetes. Diagnoses and all orders for this visit:    Type 2 diabetes mellitus without complication, without long-term current use of insulin (HCC)  -     MICROALBUMIN, UR, RAND W/ MICROALBUMIN/CREA RATIO  -     HEMOGLOBIN A1C WITH EAG  -pt compliant with taking metformin started at last ov. Pt is enrolled in [facility] program and actively participates. -pt to continue to check bs once a day; reviewed bs goals   -encouraged diabetic diet; diabetic diet resources given to pt         -strongly encouraged pt to stop smoking. Reviewed increase risk of cardiovascular events due to smoking and having              diabetes. Essential hypertension, benign  -     METABOLIC PANEL, COMPREHENSIVE  -DASH diet   -pt instructed to take 2 blood pressure readings outside of office and call with results  -continue propanolol   -RTO in 2 weeks for in office BP check   -encouraged smoking cessation. Pt feels that he can reduce daily cigarettes   Hyperlipidemia       -     LIPID PANEL      -pt non fasting       -pt has been off of statin for last 3 months due to muscle pain      -will add rx based on labs       -reviewed low cholesterol diet   Chronic neck pain  -     baclofen (LIORESAL) 20 mg tablet;  Take 1 Tab by mouth three (3) times daily.  -increased baclofen rx   -light stretching   -prn otc tylenol or ibuprofen for pain       Follow-up Disposition:  Return in about 2 weeks (around 5/19/2017), or if symptoms worsen or fail to improve, for BP check . I have discussed the diagnosis with the patient and the intended plan as seen in the above orders. The patient understands and agrees with the plan. The patient has received an after-visit summary and questions were answered concerning future plans. Medication Side Effects and Warnings were discussed with patient: yes  Patient Labs were reviewed and or requested: yes  Patient Past Records were reviewed and or requested: yes    Jessica Pena, NP-C     Patient Instructions        Back Stretches: Exercises  Your Care Instructions  Here are some examples of exercises for stretching your back. Start each exercise slowly. Ease off the exercise if you start to have pain. Your doctor or physical therapist will tell you when you can start these exercises and which ones will work best for you. How to do the exercises  Overhead stretch    1. Stand comfortably with your feet shoulder-width apart. 2. Looking straight ahead, raise both arms over your head and reach toward the ceiling. Do not allow your head to tilt back. 3. Hold for 15 to 30 seconds, then lower your arms to your sides. 4. Repeat 2 to 4 times. Side stretch    1. Stand comfortably with your feet shoulder-width apart. 2. Raise one arm over your head, and then lean to the other side. 3. Slide your hand down your leg as you let the weight of your arm gently stretch your side muscles. Hold for 15 to 30 seconds. 4. Repeat 2 to 4 times on each side. Press-up    1. Lie on your stomach, supporting your body with your forearms. 2. Press your elbows down into the floor to raise your upper back. As you do this, relax your stomach muscles and allow your back to arch without using your back muscles.  As your press up, do not let your hips or pelvis come off the floor. 3. Hold for 15 to 30 seconds, then relax. 4. Repeat 2 to 4 times. Relax and rest    1. Lie on your back with a rolled towel under your neck and a pillow under your knees. Extend your arms comfortably to your sides. 2. Relax and breathe normally. 3. Remain in this position for about 10 minutes. 4. If you can, do this 2 or 3 times each day. Follow-up care is a key part of your treatment and safety. Be sure to make and go to all appointments, and call your doctor if you are having problems. It's also a good idea to know your test results and keep a list of the medicines you take. Where can you learn more? Go to http://celia-milton.info/. Enter W634 in the search box to learn more about \"Back Stretches: Exercises. \"  Current as of: May 23, 2016  Content Version: 11.2  © 3490-9310 [employer]. Care instructions adapted under license by [employer] (which disclaims liability or warranty for this information). If you have questions about a medical condition or this instruction, always ask your healthcare professional. [address] any warranty or liability for your use of this information.

## 2017-05-06 LAB
ALBUMIN SERPL-MCNC: 4.4 G/DL (ref 3.5–5.5)
ALBUMIN/CREAT UR: 13.7 MG/G CREAT (ref 0–30)
ALBUMIN/GLOB SERPL: 1.8 {RATIO} (ref 1.2–2.2)
ALP SERPL-CCNC: 114 IU/L (ref 39–117)
ALT SERPL-CCNC: 24 IU/L (ref 0–44)
AST SERPL-CCNC: 22 IU/L (ref 0–40)
BILIRUB SERPL-MCNC: <0.2 MG/DL (ref 0–1.2)
BUN SERPL-MCNC: 18 MG/DL (ref 6–24)
BUN/CREAT SERPL: 16 (ref 9–20)
CALCIUM SERPL-MCNC: 9.7 MG/DL (ref 8.7–10.2)
CHLORIDE SERPL-SCNC: 101 MMOL/L (ref 96–106)
CHOLEST SERPL-MCNC: 196 MG/DL (ref 100–199)
CO2 SERPL-SCNC: 21 MMOL/L (ref 18–29)
CREAT SERPL-MCNC: 1.12 MG/DL (ref 0.76–1.27)
CREAT UR-MCNC: 90.3 MG/DL
EST. AVERAGE GLUCOSE BLD GHB EST-MCNC: 131 MG/DL
GLOBULIN SER CALC-MCNC: 2.5 G/DL (ref 1.5–4.5)
GLUCOSE SERPL-MCNC: 93 MG/DL (ref 65–99)
HBA1C MFR BLD: 6.2 % (ref 4.8–5.6)
HDLC SERPL-MCNC: 27 MG/DL
INTERPRETATION, 910389: NORMAL
LDLC SERPL CALC-MCNC: 109 MG/DL (ref 0–99)
Lab: NORMAL
MICROALBUMIN UR-MCNC: 12.4 UG/ML
POTASSIUM SERPL-SCNC: 4.3 MMOL/L (ref 3.5–5.2)
PROT SERPL-MCNC: 6.9 G/DL (ref 6–8.5)
SODIUM SERPL-SCNC: 141 MMOL/L (ref 134–144)
TRIGL SERPL-MCNC: 300 MG/DL (ref 0–149)
VLDLC SERPL CALC-MCNC: 60 MG/DL (ref 5–40)

## 2017-05-08 NOTE — PROGRESS NOTES
Spoke to pt to review lab results. Urine micro normal. No signs of developing nephropathy. Metabolic panel WNL. Cholesterol above goal but better. Pt non fasting at ov. Pt to make diet changes and recheck in 3 months fasting. A1c at goal!   Discussed with patient goal of Diabetes to include: HgA1C <7, LDL cholesterol <100, Blood pressure <140/80. Discussed with patient diet and weight management and to get regular exercise. Recommend yearly eye exams and daily foot care. The patient understands and agrees with the plan.

## 2017-05-24 ENCOUNTER — TELEPHONE (OUTPATIENT)
Dept: FAMILY MEDICINE CLINIC | Age: 52
End: 2017-05-24

## 2017-05-24 NOTE — TELEPHONE ENCOUNTER
TP #8 C-A  Spoke w/ pt as part of [facility] Outreach program.     Pt has not checked BP outside of office like instructed. He plans on making appointment to have bp rechecked. Pt is compliant with medications. Reviewed heart healthy, low cholesterol diet.

## 2017-05-26 DIAGNOSIS — E11.9 TYPE 2 DIABETES MELLITUS WITHOUT COMPLICATION, WITHOUT LONG-TERM CURRENT USE OF INSULIN (HCC): ICD-10-CM

## 2017-05-30 DIAGNOSIS — G60.9 IDIOPATHIC PERIPHERAL NEUROPATHY: ICD-10-CM

## 2017-05-30 RX ORDER — EPINEPHRINE 0.3 MG/.3ML
0.3 INJECTION SUBCUTANEOUS
Qty: 2 SYRINGE | Refills: 0 | Status: SHIPPED | OUTPATIENT
Start: 2017-05-30 | End: 2018-02-05 | Stop reason: SDUPTHER

## 2017-05-30 RX ORDER — CALCIUM CITRATE/VITAMIN D3 200MG-6.25
TABLET ORAL
Qty: 50 STRIP | Refills: 0 | Status: SHIPPED | OUTPATIENT
Start: 2017-05-30 | End: 2017-07-10 | Stop reason: SDUPTHER

## 2017-05-30 RX ORDER — DULOXETIN HYDROCHLORIDE 60 MG/1
60 CAPSULE, DELAYED RELEASE ORAL 2 TIMES DAILY
Qty: 60 CAP | Refills: 11 | Status: SHIPPED | OUTPATIENT
Start: 2017-05-30

## 2017-06-29 ENCOUNTER — TELEPHONE (OUTPATIENT)
Dept: FAMILY MEDICINE CLINIC | Age: 52
End: 2017-06-29

## 2017-06-29 DIAGNOSIS — I10 ESSENTIAL HYPERTENSION, BENIGN: ICD-10-CM

## 2017-06-29 NOTE — TELEPHONE ENCOUNTER
TP #8 C-A  Spoke w/ pt as part of [facility] Outreach program.     Pt is doing well with diabetes. No questions or concerns at this time. He has not checked his bp outside of the office as instructed. Discussed with patient goal of Diabetes to include: HgA1C <7, LDL cholesterol <100, Blood pressure <140/80. Discussed with patient diet and weight management and to get regular exercise. Recommend yearly eye exams and daily foot care. The patient understands and agrees with the plan.

## 2017-06-30 RX ORDER — PROPRANOLOL HYDROCHLORIDE 20 MG/1
20 TABLET ORAL 2 TIMES DAILY
Qty: 180 TAB | Refills: 3 | Status: SHIPPED | OUTPATIENT
Start: 2017-06-30 | End: 2017-08-10 | Stop reason: SDUPTHER

## 2017-07-10 DIAGNOSIS — E11.9 TYPE 2 DIABETES MELLITUS WITHOUT COMPLICATION, WITHOUT LONG-TERM CURRENT USE OF INSULIN (HCC): ICD-10-CM

## 2017-07-10 RX ORDER — CALCIUM CITRATE/VITAMIN D3 200MG-6.25
TABLET ORAL
Qty: 50 STRIP | Refills: 0 | Status: SHIPPED | OUTPATIENT
Start: 2017-07-10 | End: 2017-08-10 | Stop reason: SDUPTHER

## 2017-07-17 DIAGNOSIS — E11.9 TYPE 2 DIABETES MELLITUS WITHOUT COMPLICATION, WITHOUT LONG-TERM CURRENT USE OF INSULIN (HCC): Primary | ICD-10-CM

## 2017-07-30 DIAGNOSIS — M54.2 CHRONIC NECK PAIN: ICD-10-CM

## 2017-07-30 DIAGNOSIS — G89.29 CHRONIC NECK PAIN: ICD-10-CM

## 2017-07-31 RX ORDER — BACLOFEN 20 MG/1
TABLET ORAL
Qty: 60 TAB | Refills: 0 | Status: SHIPPED | OUTPATIENT
Start: 2017-07-31 | End: 2017-08-10 | Stop reason: SDUPTHER

## 2017-08-10 ENCOUNTER — OFFICE VISIT (OUTPATIENT)
Dept: FAMILY MEDICINE CLINIC | Age: 52
End: 2017-08-10

## 2017-08-10 ENCOUNTER — HOSPITAL ENCOUNTER (OUTPATIENT)
Dept: LAB | Age: 52
Discharge: HOME | End: 2017-08-10
Payer: MEDICARE

## 2017-08-10 VITALS
HEIGHT: 69 IN | WEIGHT: 166 LBS | RESPIRATION RATE: 20 BRPM | OXYGEN SATURATION: 95 % | HEART RATE: 68 BPM | DIASTOLIC BLOOD PRESSURE: 76 MMHG | TEMPERATURE: 97.8 F | SYSTOLIC BLOOD PRESSURE: 113 MMHG | BODY MASS INDEX: 24.59 KG/M2

## 2017-08-10 DIAGNOSIS — E78.00 PURE HYPERCHOLESTEROLEMIA: ICD-10-CM

## 2017-08-10 DIAGNOSIS — E11.9 TYPE 2 DIABETES MELLITUS WITHOUT COMPLICATION, WITHOUT LONG-TERM CURRENT USE OF INSULIN (HCC): Primary | ICD-10-CM

## 2017-08-10 DIAGNOSIS — I10 ESSENTIAL HYPERTENSION, BENIGN: ICD-10-CM

## 2017-08-10 DIAGNOSIS — M54.2 CHRONIC NECK PAIN: ICD-10-CM

## 2017-08-10 DIAGNOSIS — G60.9 IDIOPATHIC PERIPHERAL NEUROPATHY: ICD-10-CM

## 2017-08-10 DIAGNOSIS — G89.29 CHRONIC NECK PAIN: ICD-10-CM

## 2017-08-10 DIAGNOSIS — E11.9 TYPE 2 DIABETES MELLITUS WITHOUT COMPLICATION, WITHOUT LONG-TERM CURRENT USE OF INSULIN (HCC): ICD-10-CM

## 2017-08-10 PROCEDURE — 83036 HEMOGLOBIN GLYCOSYLATED A1C: CPT

## 2017-08-10 PROCEDURE — 80061 LIPID PANEL: CPT

## 2017-08-10 PROCEDURE — 80053 COMPREHEN METABOLIC PANEL: CPT

## 2017-08-10 RX ORDER — IBUPROFEN 800 MG/1
800 TABLET ORAL
Qty: 300 TAB | Refills: 3 | Status: SHIPPED | OUTPATIENT
Start: 2017-08-10 | End: 2018-02-05 | Stop reason: SDUPTHER

## 2017-08-10 RX ORDER — FUROSEMIDE 20 MG/1
20 TABLET ORAL DAILY
Qty: 90 TAB | Refills: 1 | Status: SHIPPED | OUTPATIENT
Start: 2017-08-10 | End: 2018-02-05 | Stop reason: SDUPTHER

## 2017-08-10 RX ORDER — CALCIUM CITRATE/VITAMIN D3 200MG-6.25
TABLET ORAL
Qty: 50 STRIP | Refills: 0 | Status: SHIPPED | OUTPATIENT
Start: 2017-08-10 | End: 2017-10-23 | Stop reason: SDUPTHER

## 2017-08-10 RX ORDER — POTASSIUM CHLORIDE 20 MEQ/1
20 TABLET, EXTENDED RELEASE ORAL DAILY
Qty: 90 TAB | Refills: 1 | Status: SHIPPED | OUTPATIENT
Start: 2017-08-10 | End: 2018-02-05 | Stop reason: SDUPTHER

## 2017-08-10 RX ORDER — PROPRANOLOL HYDROCHLORIDE 20 MG/1
20 TABLET ORAL 2 TIMES DAILY
Qty: 180 TAB | Refills: 3 | Status: SHIPPED | OUTPATIENT
Start: 2017-08-10 | End: 2018-02-05 | Stop reason: SDUPTHER

## 2017-08-10 RX ORDER — METFORMIN HYDROCHLORIDE 500 MG/1
TABLET, EXTENDED RELEASE ORAL
Qty: 90 TAB | Refills: 3 | Status: SHIPPED | OUTPATIENT
Start: 2017-08-10 | End: 2018-02-05 | Stop reason: SDUPTHER

## 2017-08-10 RX ORDER — BACLOFEN 20 MG/1
20 TABLET ORAL 3 TIMES DAILY
Qty: 270 TAB | Refills: 1 | Status: SHIPPED | OUTPATIENT
Start: 2017-08-10 | End: 2017-09-08 | Stop reason: SDUPTHER

## 2017-08-10 NOTE — MR AVS SNAPSHOT
Visit Information Date & Time Provider Department Dept. Phone Encounter #  
 8/10/2017 11:10 AM Rosaura Mccall MD [address] [facility] [phone] [id number] Follow-up Instructions Return in about 3 months (around 11/10/2017) for dm. Upcoming Health Maintenance Date Due Hepatitis C Screening 1965 FOOT EXAM Q1 9/15/1973 EYE EXAM RETINAL OR DILATED Q1 9/15/1973 Pneumococcal 19-64 Medium Risk (1 of 1 - PPSV23) 9/15/1982 DTaP/Tdap/Td series (1 - Tdap) 9/15/1984 FOBT Q 1 YEAR AGE 50-75 9/18/2015 INFLUENZA AGE 9 TO ADULT 8/1/2017 HEMOGLOBIN A1C Q6M 11/5/2017 MICROALBUMIN Q1 5/5/2018 LIPID PANEL Q1 5/5/2018 Allergies as of 8/10/2017  Review Complete On: 8/10/2017 By: Rosaura Mccall MD  
  
 Severity Noted Reaction Type Reactions Bee Sting [Sting, Bee] High 09/07/2016    Anaphylaxis Demerol [Meperidine] High 09/07/2016    Anaphylaxis Bee Sting [Sting, Bee]  09/02/2011    Hives Demerol [Meperidine]  08/13/2010    Palpitations Current Immunizations  Reviewed on 9/18/2014 Name Date Influenza Vaccine Split 10/27/2011 Not reviewed this visit You Were Diagnosed With   
  
 Codes Comments Type 2 diabetes mellitus without complication, without long-term current use of insulin (HCC)    -  Primary ICD-10-CM: E11.9 ICD-9-CM: 250.00 Essential hypertension, benign     ICD-10-CM: I10 
ICD-9-CM: 401.1 Pure hypercholesterolemia     ICD-10-CM: E78.00 ICD-9-CM: 272.0 Chronic neck pain     ICD-10-CM: M54.2, G89.29 ICD-9-CM: 723.1, 338.29 Idiopathic peripheral neuropathy     ICD-10-CM: G60.9 ICD-9-CM: 356.9 Vitals BP Pulse Temp Resp Height(growth percentile) Weight(growth percentile) 113/76 68 97.8 °F (36.6 °C) 20 5' 9\" (1.753 m) 166 lb (75.3 kg) SpO2 BMI Smoking Status 95% 24.51 kg/m2 Current Every Day Smoker Vitals History BMI and BSA Data Body Mass Index Body Surface Area 24.51 kg/m 2 1.91 m 2 Preferred Pharmacy Pharmacy Name Phone [address] [phone] Your Updated Medication List  
  
   
This list is accurate as of: 8/10/17 12:04 PM.  Always use your most recent med list.  
  
  
  
  
 acetaminophen 325 mg tablet Commonly known as:  TYLENOL Take 1 Tab by mouth every four (4) hours as needed for Pain. Indications: MYALGIA  
  
 baclofen 20 mg tablet Commonly known as:  LIORESAL Take 1 Tab by mouth three (3) times daily. Blood-Glucose Meter monitoring kit Check BS once a day. DULoxetine 60 mg capsule Commonly known as:  CYMBALTA Take 1 Cap by mouth two (2) times a day. EPINEPHrine 0.3 mg/0.3 mL injection Commonly known as:  EPIPEN  
0.3 mL by IntraMUSCular route once as needed. furosemide 20 mg tablet Commonly known as:  LASIX Take 1 Tab by mouth daily. ibuprofen 800 mg tablet Commonly known as:  MOTRIN Take 1 Tab by mouth every eight (8) hours as needed for Pain. Lancets Misc Check bs once a day. metFORMIN  mg tablet Commonly known as:  GLUCOPHAGE XR  
TAKE 1 TABLET BY MOUTH DAILY WITH DINNER FOR DIABETES  Indications: type 2 diabetes mellitus Polyethylene Glycol 3350 Powd 1 Cap by Does Not Apply route daily. potassium chloride 20 mEq tablet Commonly known as:  K-DUR, KLOR-CON Take 1 Tab by mouth daily. PRAVACHOL 40 mg tablet Generic drug:  pravastatin Take 40 mg by mouth nightly. propranolol 20 mg tablet Commonly known as:  INDERAL Take 1 Tab by mouth two (2) times a day. * QUEtiapine 300 mg tablet Commonly known as:  SEROquel Take 600 mg by mouth nightly. * QUEtiapine 200 mg tablet Commonly known as:  SEROquel Take 200 mg by mouth every morning. TRUE METRIX GLUCOSE TEST STRIP strip Generic drug:  glucose blood VI test strips TEST ONCE DAILY * Notice: This list has 2 medication(s) that are the same as other medications prescribed for you. Read the directions carefully, and ask your doctor or other care provider to review them with you. Prescriptions Sent to Pharmacy Refills  
 baclofen (LIORESAL) 20 mg tablet 1 Sig: Take 1 Tab by mouth three (3) times daily. Class: Normal  
 Pharmacy: [facility] [facility] [place] Ph #: [phone] Route: Oral  
 furosemide (LASIX) 20 mg tablet 1 Sig: Take 1 Tab by mouth daily. Class: Normal  
 Pharmacy: [facility] [address] [facility] [place] Ph #: [phone] Route: Oral  
 propranolol (INDERAL) 20 mg tablet 3 Sig: Take 1 Tab by mouth two (2) times a day. Class: Normal  
 Pharmacy: [facility] [address] [facility] [place] Ph #: [phone] Route: Oral  
 metFORMIN ER (GLUCOPHAGE XR) 500 mg tablet 3 Sig: TAKE 1 TABLET BY MOUTH DAILY WITH DINNER FOR DIABETES  Indications: type 2 diabetes mellitus Class: Normal  
 Pharmacy: [facility] [address] Ph #: [phone]  
 potassium chloride (K-DUR, KLOR-CON) 20 mEq tablet 1 Sig: Take 1 Tab by mouth daily. Class: Normal  
 Pharmacy: [facility] [address] [facility] [place] Ph #: [phone] Route: Oral  
 ibuprofen (MOTRIN) 800 mg tablet 3 Sig: Take 1 Tab by mouth every eight (8) hours as needed for Pain.   
 Class: Normal  
 Pharmacy: [facility] [address] [place] [address] #: [phone] Route: Oral  
  
We Performed the Following HEMOGLOBIN A1C WITH EAG [92947 CPT(R)] LIPID PANEL [03609 CPT(R)] METABOLIC PANEL, COMPREHENSIVE [65340 CPT(R)] REFERRAL TO OPHTHALMOLOGY [REF57 Custom] Follow-up Instructions Return in about 3 months (around 11/10/2017) for dm. Referral Information Referral ID Referred By Referred To  
  
 [id number] CECILIA MERLOS Not Available Visits Status Start Date End Date 1 New Request 8/10/17 8/10/18 If your referral has a status of pending review or denied, additional information will be sent to support the outcome of this decision. Introducing [facility]! Jhonatan Kulkarni introduces [employer] patient portal. Now you can access parts of your medical record, email your doctor's office, and request medication refills online. 1. In your internet browser, go to [email or web address] [email or web address] 2. Click on the First Time User? Click Here link in the Sign In box. You will see the New Member Sign Up page. 3. Enter your V[employer] Access Code exactly as it appears below. You will not need to use this code after youve completed the sign-up process. If you do not sign up before the expiration date, you must request a new code. · V[employer] Access Code: [email or web address] Expires: 11/8/2017 12:04 PM 
 
4. Enter the last four digits of your Social Security Number (xxxx) and Date of Birth (mm/dd/yyyy) as indicated and click Submit. You will be taken to the next sign-up page. 5. Create a Vidmind ID. This will be your Vidmind login ID and cannot be changed, so think of one that is secure and easy to remember. 6. Create a [employer] password. You can change your password at any time. 7. Enter your Password Reset Question and Answer. This can be used at a later time if you forget your password. 8. Enter your e-mail address. You will receive e-mail notification when new information is available in [address]. 9. Click Sign Up. You can now view and download portions of your medical record. 10. Click the Download Summary menu link to download a portable copy of your medical information. If you have questions, please visit the Frequently Asked Questions section of the [employer] website. Remember, [employer] is NOT to be used for urgent needs. For medical emergencies, dial 911. Now available from your iPhone and Android! Please provide this summary of care documentation to your next provider. Your primary care clinician is listed as CECILIA MERLOS. If you have any questions after today's visit, please call [phone].

## 2017-08-10 NOTE — PROGRESS NOTES
Patient ehre for med refills and dm fasting labs. 1. Have you been to the ER, urgent care clinic since your last visit? Hospitalized since your last visit? No    2. Have you seen or consulted any other health care providers outside of the [facility] since your last visit? Include any pap smears or colon screening. No       Talia Fabian  8/10/2017  Provider:   Hola:  Diabetes Report Card   1) Have you seen the eye doctor in past year?no    2) How would you  rate your Diabetic Diet?good   3) How well do you take care of your feet?well   4) Do you keep your Primary Care Follow Up Appts? yes    5) Do you know your A1C goal?yes    6) Do you take your medications daily? yes    7) Do you check your blood sugars? yes    8) Have you gained weight?no       9) Do you follow an exercise program?no   10) Can you do better?yes      Lab Results   Component Value Date/Time    Cholesterol, total 196 05/05/2017 01:55 PM    HDL Cholesterol 27 05/05/2017 01:55 PM    LDL, calculated 109 05/05/2017 01:55 PM    Triglyceride 300 05/05/2017 01:55 PM     Lab Results   Component Value Date/Time    Hemoglobin A1c 6.2 05/05/2017 01:55 PM    Hemoglobin A1c 7.1 01/31/2017 02:34 PM    Hemoglobin A1c 6.2 07/11/2016 11:20 AM    Glucose 93 05/05/2017 01:55 PM    Glucose (POC) 193 09/02/2011 06:15 PM    Microalb/Creat ratio (ug/mg creat.) 13.7 05/05/2017 01:55 PM    LDL, calculated 109 05/05/2017 01:55 PM    Creatinine (POC) 0.7 01/04/2012 03:21 PM    Creatinine 1.12 05/05/2017 01:55 PM        Lab Results   Component Value Date/Time    Microalb/Creat ratio (ug/mg creat.) 13.7 05/05/2017 01:55 PM      Chief Complaint   Patient presents with    Diabetes     not taking cholesterol meds.  Medication Refill     he is a 46y.o. year old male who presents for evaluation. See Diabetic Report Card listed above.      Patient Active Problem List    Diagnosis    Type 2 diabetes mellitus without complication, without long-term current use of insulin (HCC)    Essential hypertension, benign    Right hand pain    Pain in both feet    Peripheral neuropathy (HCC)    Idiopathic peripheral neuropathy    Chronic neck pain    Cervical spondylosis    Degenerative cervical disc    Hyperlipidemia    History of MI (myocardial infarction)    RSD (reflex sympathetic dystrophy)    [facility]ia (psychosis) ([facility]    Delusions of persecution    Schizoaffective disorder [facility])       Nurses notes were copied in to this note. Reviewed PmHx, RxHx, FmHx, SocHx, AllgHx--dated and updated in the chart. Review of Systems - negative except as listed above in the HPI    Objective:     Vitals:    08/10/17 1137   BP: 113/76   Pulse: 68   Resp: 20   Temp: 97.8 °F (36.6 °C)   SpO2: 95%   Weight: 166 lb (75.3 kg)   Height: 5' 9\" (1.753 m)     Physical Examination: General appearance - alert, well appearing, and in no distress  Chest - clear to auscultation, no wheezes, rales or rhonchi, symmetric air entry  Heart - normal rate, regular rhythm, normal S1, S2, no murmurs, rubs, clicks or gallops  Abdomen - soft, nontender, nondistended, no masses or organomegaly      Assessment/ Plan:   Diagnoses and all orders for this visit:    1. Type 2 diabetes mellitus without complication, without long-term current use of insulin (HCC)  -     metFORMIN ER (GLUCOPHAGE XR) 500 mg tablet; TAKE 1 TABLET BY MOUTH DAILY WITH DINNER FOR DIABETES  Indications: type 2 diabetes mellitus  -     LIPID PANEL  -     METABOLIC PANEL, COMPREHENSIVE  -     HEMOGLOBIN A1C WITH EAG  -     REFERRAL TO OPHTHALMOLOGY  -I discussed with the patient the new \"Diabetes Full [place]\" outreach program.  Patient has agreed to participate and understands expectations and goals  Our brochure, along with the listed current labs and standards of care, was given to the patient. The patient also met with Nicole Camp ([facility]), who will be contacting the patient outside of their appointments.       2. Essential hypertension, benign  -     furosemide (LASIX) 20 mg tablet; Take 1 Tab by mouth daily. -     propranolol (INDERAL) 20 mg tablet; Take 1 Tab by mouth two (2) times a day. -     potassium chloride (K-DUR, KLOR-CON) 20 mEq tablet; Take 1 Tab by mouth daily.  -     LIPID PANEL  -     METABOLIC PANEL, COMPREHENSIVE  -at goal    3. Pure hypercholesterolemia  -     LIPID PANEL  -     METABOLIC PANEL, COMPREHENSIVE    4. Chronic neck pain  -     baclofen (LIORESAL) 20 mg tablet; Take 1 Tab by mouth three (3) times daily. 5. Idiopathic peripheral neuropathy  -stable    Other orders  -     ibuprofen (MOTRIN) 800 mg tablet; Take 1 Tab by mouth every eight (8) hours as needed for Pain. Follow-up Disposition:  Return in about 3 months (around 11/10/2017) for dm. Lab Results   Component Value Date/Time    Cholesterol, total 196 05/05/2017 01:55 PM    HDL Cholesterol 27 05/05/2017 01:55 PM    LDL, calculated 109 05/05/2017 01:55 PM    Triglyceride 300 05/05/2017 01:55 PM     Lab Results   Component Value Date/Time    Hemoglobin A1c 6.2 05/05/2017 01:55 PM    Hemoglobin A1c 7.1 01/31/2017 02:34 PM    Hemoglobin A1c 6.2 07/11/2016 11:20 AM    Microalb/Creat ratio (ug/mg creat.) 13.7 05/05/2017 01:55 PM    LDL, calculated 109 05/05/2017 01:55 PM    Creatinine (POC) 0.7 01/04/2012 03:21 PM    Creatinine 1.12 05/05/2017 01:55 PM          Discussed with patient goal of Diabetes to include:  HgA1C <7, LDL cholesterol <70, Blood pressure <130/80. Discussed with patient diet and weight management and to get regular exercise. Recommend yearly eye exams and daily foot care. The patient understands and agrees with the plan. I have discussed the diagnosis with the patient and the intended plan as seen in the above orders. The patient has received an after-visit summary and questions were answered concerning future plans.      Medication Side Effects and Warnings were discussed with patient  Patient Labs were reviewed and or requested  Patient Past Records were reviewed and or requested    Georgie Christianson M.D. [address] [facility]    There are no Patient Instructions on file for this visit.

## 2017-08-11 ENCOUNTER — TELEPHONE (OUTPATIENT)
Dept: FAMILY MEDICINE CLINIC | Age: 52
End: 2017-08-11

## 2017-08-11 LAB
ALBUMIN SERPL-MCNC: 4.2 G/DL (ref 3.5–5.5)
ALBUMIN/GLOB SERPL: 1.8 {RATIO} (ref 1.2–2.2)
ALP SERPL-CCNC: 115 IU/L (ref 39–117)
ALT SERPL-CCNC: 20 IU/L (ref 0–44)
AST SERPL-CCNC: 15 IU/L (ref 0–40)
BILIRUB SERPL-MCNC: <0.2 MG/DL (ref 0–1.2)
BUN SERPL-MCNC: 13 MG/DL (ref 6–24)
BUN/CREAT SERPL: 11 (ref 9–20)
CALCIUM SERPL-MCNC: 9.2 MG/DL (ref 8.7–10.2)
CHLORIDE SERPL-SCNC: 101 MMOL/L (ref 96–106)
CHOLEST SERPL-MCNC: 169 MG/DL (ref 100–199)
CO2 SERPL-SCNC: 23 MMOL/L (ref 18–29)
CREAT SERPL-MCNC: 1.16 MG/DL (ref 0.76–1.27)
EST. AVERAGE GLUCOSE BLD GHB EST-MCNC: 140 MG/DL
GLOBULIN SER CALC-MCNC: 2.4 G/DL (ref 1.5–4.5)
GLUCOSE SERPL-MCNC: 128 MG/DL (ref 65–99)
HBA1C MFR BLD: 6.5 % (ref 4.8–5.6)
HDLC SERPL-MCNC: 24 MG/DL
INTERPRETATION, 910389: NORMAL
LDLC SERPL CALC-MCNC: 106 MG/DL (ref 0–99)
Lab: NORMAL
POTASSIUM SERPL-SCNC: 4.8 MMOL/L (ref 3.5–5.2)
PROT SERPL-MCNC: 6.6 G/DL (ref 6–8.5)
SODIUM SERPL-SCNC: 141 MMOL/L (ref 134–144)
TRIGL SERPL-MCNC: 194 MG/DL (ref 0–149)
VLDLC SERPL CALC-MCNC: 39 MG/DL (ref 5–40)

## 2017-08-11 NOTE — PROGRESS NOTES
x1 attempt to contact pt to review lab results. 1. Total cholesterol at goal. Continue heart healthy diet and exercise. Does pt take cholesterol medication, pravastatin? 2. Metabolic panel within normal limits. Glucose elevated from diabetes. 3. A1c shows diabetes is in control. Please continue to take medications and stick to a diabetic diet. Recheck labs in 3 months.

## 2017-09-08 ENCOUNTER — TELEPHONE (OUTPATIENT)
Dept: FAMILY MEDICINE CLINIC | Age: 52
End: 2017-09-08

## 2017-09-08 DIAGNOSIS — M54.2 CHRONIC NECK PAIN: ICD-10-CM

## 2017-09-08 DIAGNOSIS — G89.29 CHRONIC NECK PAIN: ICD-10-CM

## 2017-09-08 RX ORDER — BACLOFEN 20 MG/1
20 TABLET ORAL 3 TIMES DAILY
Qty: 270 TAB | Refills: 1 | Status: SHIPPED | OUTPATIENT
Start: 2017-09-08 | End: 2018-02-05 | Stop reason: SDUPTHER

## 2017-09-08 NOTE — TELEPHONE ENCOUNTER
Pharmacist stated that they never received the 8/10/17 escribe for baclofen and would like it to be resent.

## 2017-09-08 NOTE — TELEPHONE ENCOUNTER
Pt was given refills on baclofen and ibuprofen on 8/10/17. He states the pharm did not have the baclofen but he did get 270 ibuprofen. Neet to call pharm and check if they have on his records.  Freida @ [phone]  [facility] [phone] and can leave a message

## 2017-09-13 ENCOUNTER — DOCUMENTATION ONLY (OUTPATIENT)
Dept: FAMILY MEDICINE CLINIC | Age: 52
End: 2017-09-13

## 2017-10-23 DIAGNOSIS — E11.9 TYPE 2 DIABETES MELLITUS WITHOUT COMPLICATION, WITHOUT LONG-TERM CURRENT USE OF INSULIN (HCC): ICD-10-CM

## 2017-10-23 RX ORDER — CALCIUM CITRATE/VITAMIN D3 200MG-6.25
TABLET ORAL
Qty: 50 STRIP | Refills: 0 | Status: SHIPPED | OUTPATIENT
Start: 2017-10-23 | End: 2017-11-30 | Stop reason: SDUPTHER

## 2017-11-30 DIAGNOSIS — E11.9 TYPE 2 DIABETES MELLITUS WITHOUT COMPLICATION, WITHOUT LONG-TERM CURRENT USE OF INSULIN (HCC): ICD-10-CM

## 2017-11-30 RX ORDER — CALCIUM CITRATE/VITAMIN D3 200MG-6.25
TABLET ORAL
Qty: 50 STRIP | Refills: 0 | Status: SHIPPED | OUTPATIENT
Start: 2017-11-30 | End: 2018-01-16 | Stop reason: SDUPTHER

## 2018-01-02 ENCOUNTER — DOCUMENTATION ONLY (OUTPATIENT)
Dept: FAMILY MEDICINE CLINIC | Age: 53
End: 2018-01-02

## 2018-01-08 ENCOUNTER — TELEPHONE (OUTPATIENT)
Dept: FAMILY MEDICINE CLINIC | Age: 53
End: 2018-01-08

## 2018-01-08 NOTE — TELEPHONE ENCOUNTER
TP #11  Spoke w/ pt as part of Diabetes Full [place] outreach program.     Pt concerned bs are up due to poor diet. Pt states diet has been off b/c of the holidays. A1c is due in February. Pt is cutting back on sweets and snacks. Pt is scheduled for Feb 5 at 9:30 AM.      Discussed with patient goal of Diabetes to include: HgA1C <7, LDL cholesterol <100, Blood pressure <140/80. Discussed with patient diet and weight management and to get regular exercise. Recommend yearly eye exams and daily foot care. The patient understands and agrees with the plan.

## 2018-01-16 DIAGNOSIS — E11.9 TYPE 2 DIABETES MELLITUS WITHOUT COMPLICATION, WITHOUT LONG-TERM CURRENT USE OF INSULIN (HCC): ICD-10-CM

## 2018-01-16 RX ORDER — CALCIUM CITRATE/VITAMIN D3 200MG-6.25
TABLET ORAL
Qty: 50 STRIP | Refills: 0 | Status: SHIPPED | OUTPATIENT
Start: 2018-01-16 | End: 2018-02-05 | Stop reason: SDUPTHER

## 2018-01-22 ENCOUNTER — DOCUMENTATION ONLY (OUTPATIENT)
Dept: FAMILY MEDICINE CLINIC | Age: 53
End: 2018-01-22

## 2018-01-23 ENCOUNTER — DOCUMENTATION ONLY (OUTPATIENT)
Dept: FAMILY MEDICINE CLINIC | Age: 53
End: 2018-01-23

## 2018-02-05 ENCOUNTER — OFFICE VISIT (OUTPATIENT)
Dept: FAMILY MEDICINE CLINIC | Age: 53
End: 2018-02-05

## 2018-02-05 ENCOUNTER — HOSPITAL ENCOUNTER (OUTPATIENT)
Dept: LAB | Age: 53
Discharge: HOME | End: 2018-02-05
Payer: MEDICARE

## 2018-02-05 VITALS
HEART RATE: 76 BPM | OXYGEN SATURATION: 96 % | SYSTOLIC BLOOD PRESSURE: 132 MMHG | DIASTOLIC BLOOD PRESSURE: 82 MMHG | TEMPERATURE: 97.8 F | WEIGHT: 169 LBS | BODY MASS INDEX: 25.03 KG/M2 | HEIGHT: 69 IN | RESPIRATION RATE: 17 BRPM

## 2018-02-05 DIAGNOSIS — I10 ESSENTIAL HYPERTENSION, BENIGN: ICD-10-CM

## 2018-02-05 DIAGNOSIS — F22 PARANOIA (PSYCHOSIS) (HCC): ICD-10-CM

## 2018-02-05 DIAGNOSIS — G89.29 CHRONIC NECK PAIN: ICD-10-CM

## 2018-02-05 DIAGNOSIS — G60.9 IDIOPATHIC PERIPHERAL NEUROPATHY: ICD-10-CM

## 2018-02-05 DIAGNOSIS — M54.2 CHRONIC NECK PAIN: ICD-10-CM

## 2018-02-05 DIAGNOSIS — E78.00 PURE HYPERCHOLESTEROLEMIA: ICD-10-CM

## 2018-02-05 DIAGNOSIS — F25.0 SCHIZOAFFECTIVE DISORDER, BIPOLAR TYPE (HCC): ICD-10-CM

## 2018-02-05 DIAGNOSIS — E11.9 TYPE 2 DIABETES MELLITUS WITHOUT COMPLICATION, WITHOUT LONG-TERM CURRENT USE OF INSULIN (HCC): Primary | ICD-10-CM

## 2018-02-05 PROCEDURE — 80061 LIPID PANEL: CPT

## 2018-02-05 PROCEDURE — 83036 HEMOGLOBIN GLYCOSYLATED A1C: CPT

## 2018-02-05 PROCEDURE — 80053 COMPREHEN METABOLIC PANEL: CPT

## 2018-02-05 RX ORDER — METFORMIN HYDROCHLORIDE 500 MG/1
TABLET, EXTENDED RELEASE ORAL
Qty: 90 TAB | Refills: 3 | Status: SHIPPED | OUTPATIENT
Start: 2018-02-05 | End: 2018-04-16 | Stop reason: DRUGHIGH

## 2018-02-05 RX ORDER — DULOXETIN HYDROCHLORIDE 30 MG/1
30 CAPSULE, DELAYED RELEASE ORAL DAILY
COMMUNITY
End: 2018-12-04 | Stop reason: SDUPTHER

## 2018-02-05 RX ORDER — PRAVASTATIN SODIUM 40 MG/1
40 TABLET ORAL
Qty: 30 TAB | Refills: 11 | Status: SHIPPED | OUTPATIENT
Start: 2018-02-05 | End: 2018-05-25 | Stop reason: SDUPTHER

## 2018-02-05 RX ORDER — QUETIAPINE FUMARATE 200 MG/1
200 TABLET, FILM COATED ORAL
Status: CANCELLED | OUTPATIENT
Start: 2018-02-05

## 2018-02-05 RX ORDER — BACLOFEN 20 MG/1
20 TABLET ORAL 3 TIMES DAILY
Qty: 270 TAB | Refills: 1 | Status: SHIPPED | OUTPATIENT
Start: 2018-02-05 | End: 2018-05-25

## 2018-02-05 RX ORDER — LANCETS
EACH MISCELLANEOUS
Qty: 50 EACH | Refills: 99 | Status: SHIPPED | OUTPATIENT
Start: 2018-02-05

## 2018-02-05 RX ORDER — DULOXETIN HYDROCHLORIDE 30 MG/1
30 CAPSULE, DELAYED RELEASE ORAL DAILY
Status: CANCELLED | OUTPATIENT
Start: 2018-02-05

## 2018-02-05 RX ORDER — IBUPROFEN 800 MG/1
800 TABLET ORAL
Qty: 300 TAB | Refills: 3 | Status: SHIPPED | OUTPATIENT
Start: 2018-02-05 | End: 2018-12-04 | Stop reason: SDUPTHER

## 2018-02-05 RX ORDER — POTASSIUM CHLORIDE 20 MEQ/1
20 TABLET, EXTENDED RELEASE ORAL DAILY
Qty: 90 TAB | Refills: 1 | Status: SHIPPED | OUTPATIENT
Start: 2018-02-05 | End: 2018-12-04 | Stop reason: SDUPTHER

## 2018-02-05 RX ORDER — FUROSEMIDE 20 MG/1
20 TABLET ORAL DAILY
Qty: 90 TAB | Refills: 1 | Status: SHIPPED | OUTPATIENT
Start: 2018-02-05 | End: 2018-09-24 | Stop reason: SDUPTHER

## 2018-02-05 RX ORDER — EPINEPHRINE 0.3 MG/.3ML
0.3 INJECTION SUBCUTANEOUS
Qty: 2 SYRINGE | Refills: 0 | Status: SHIPPED | OUTPATIENT
Start: 2018-02-05 | End: 2019-04-15 | Stop reason: SDUPTHER

## 2018-02-05 RX ORDER — PROPRANOLOL HYDROCHLORIDE 20 MG/1
20 TABLET ORAL 2 TIMES DAILY
Qty: 180 TAB | Refills: 3 | Status: SHIPPED | OUTPATIENT
Start: 2018-02-05 | End: 2018-08-23 | Stop reason: ALTCHOICE

## 2018-02-05 RX ORDER — DULOXETIN HYDROCHLORIDE 60 MG/1
60 CAPSULE, DELAYED RELEASE ORAL 2 TIMES DAILY
Qty: 60 CAP | Refills: 11 | Status: CANCELLED | OUTPATIENT
Start: 2018-02-05

## 2018-02-05 RX ORDER — QUETIAPINE FUMARATE 300 MG/1
600 TABLET, FILM COATED ORAL
Status: CANCELLED | OUTPATIENT
Start: 2018-02-05

## 2018-02-05 NOTE — PROGRESS NOTES
Chief Complaint   Patient presents with    Diabetes    Labs    Medication Refill     diabetic supplies and alcohol pads     1. Have you been to the ER, urgent care clinic since your last visit? Hospitalized since your last visit? No    2. Have you seen or consulted any other health care providers outside of [facility] since your last visit? Include any pap smears or colon screening. No       Nickolas Fabian  2/5/2018  Provider:   Hola:  Diabetes Report Card   1) Have you seen the eye doctor in past year?no    2) How would you  rate your Diabetic Diet? Not Good   3) How well do you take care of your feet? yes   4) Do you keep your Primary Care Follow Up Appts? yes    5) Do you know your A1C goal?no    6) Do you take your medications daily?no    7) Do you check your blood sugars? YES   8) Have you gained weight?yes       9) Do you follow an exercise program?no    10) Can you do better?yes      Lab Results   Component Value Date/Time    Cholesterol, total 169 08/10/2017 12:05 PM    HDL Cholesterol 24 08/10/2017 12:05 PM    LDL, calculated 106 08/10/2017 12:05 PM    Triglyceride 194 08/10/2017 12:05 PM     Lab Results   Component Value Date/Time    Hemoglobin A1c 6.5 08/10/2017 12:05 PM    Hemoglobin A1c 6.2 05/05/2017 01:55 PM    Hemoglobin A1c 7.1 01/31/2017 02:34 PM    Glucose 128 08/10/2017 12:05 PM    Glucose (POC) 193 09/02/2011 06:15 PM    Microalb/Creat ratio (ug/mg creat.) 13.7 05/05/2017 01:55 PM    LDL, calculated 106 08/10/2017 12:05 PM    Creatinine (POC) 0.7 01/04/2012 03:21 PM    Creatinine 1.16 08/10/2017 12:05 PM      Lab Results   Component Value Date/Time    Microalb/Creat ratio (ug/mg creat.) 13.7 05/05/2017 01:55 PM      Chief Complaint   Patient presents with    Diabetes    Labs    Medication Refill     diabetic supplies and alcohol pads     he is a 45y.o. year old male who presents for evaluation. See Diabetic Report Card listed above.      Patient Active Problem List    Diagnosis    Type 2 diabetes mellitus without complication, without long-term current use of insulin ([facility]    Essential hypertension, benign    Right hand pain    Pain in both feet    Peripheral neuropathy    Idiopathic peripheral neuropathy    Chronic neck pain    Cervical spondylosis    Degenerative cervical disc    Hyperlipidemia    History of MI (myocardial infarction)    RSD (reflex sympathetic dystrophy)    Paranoia (psychosis) ([facility]    Delusions of persecution    Schizoaffective disorder ([facility]       Reviewed PmHx, RxHx, FmHx, SocHx, AllgHx--dated and updated in the chart. Review of Systems - negative except as listed above in the HPI    Objective:     Vitals:    02/05/18 0929   BP: 132/82   Pulse: 76   Resp: 17   Temp: 97.8 °F (36.6 °C)   TempSrc: Oral   SpO2: 96%   Weight: 169 lb (76.7 kg)   Height: 5' 9\" (1.753 m)     Physical Examination: General appearance - alert, well appearing, and in no distress  Neck - supple, no significant adenopathy  Chest - clear to auscultation, no wheezes, rales or rhonchi, symmetric air entry  Heart - normal rate, regular rhythm, normal S1, S2, no murmurs, rubs, clicks or gallops  Abdomen - soft, nontender, nondistended, no masses or organomegaly    Assessment/ Plan:   Diagnoses and all orders for this visit:    1. Type 2 diabetes mellitus without complication, without long-term current use of insulin (HCC)  -     glucose blood VI test strips (TRUE METRIX GLUCOSE TEST STRIP) strip; TEST ONCE DAILY  -     metFORMIN ER (GLUCOPHAGE XR) 500 mg tablet; TAKE 1 TABLET BY MOUTH DAILY WITH DINNER FOR DIABETES  Indications: type 2 diabetes mellitus  -     Lancets misc; Check bs once a day. -     OTHER; Alcohol pads  Dx: DM  -     REFERRAL TO OPHTHALMOLOGY  -     HEMOGLOBIN A1C WITH EAG  -     LIPID PANEL  -     METABOLIC PANEL, COMPREHENSIVE  -at goal    2. Pure hypercholesterolemia  -     pravastatin (PRAVACHOL) 40 mg tablet; Take 1 Tab by mouth nightly.   - LIPID PANEL  -     METABOLIC PANEL, COMPREHENSIVE    3. Essential hypertension, benign  -     furosemide (LASIX) 20 mg tablet; Take 1 Tab by mouth daily. -     propranolol (INDERAL) 20 mg tablet; Take 1 Tab by mouth two (2) times a day. -     potassium chloride (K-DUR, KLOR-CON) 20 mEq tablet; Take 1 Tab by mouth daily.  -     LIPID PANEL  -     METABOLIC PANEL, COMPREHENSIVE  -at goal    4. Chronic neck pain  -     baclofen (LIORESAL) 20 mg tablet; Take 1 Tab by mouth three (3) times daily. 5. Idiopathic peripheral neuropathy  -stble    6. Paranoia (psychosis) ([facility]  7. Schizoaffective disorder, bipolar type ([facility]  -seeing psych and stable    Other orders  -     ibuprofen (MOTRIN) 800 mg tablet; Take 1 Tab by mouth every eight (8) hours as needed for Pain.  -     EPINEPHrine (EPIPEN) 0.3 mg/0.3 mL injection; 0.3 mL by IntraMUSCular route once as needed. Follow-up Disposition:  Return in about 3 months (around 5/5/2018) for DM. Lab Results   Component Value Date/Time    Cholesterol, total 169 08/10/2017 12:05 PM    HDL Cholesterol 24 08/10/2017 12:05 PM    LDL, calculated 106 08/10/2017 12:05 PM    Triglyceride 194 08/10/2017 12:05 PM     Lab Results   Component Value Date/Time    Hemoglobin A1c 6.5 08/10/2017 12:05 PM    Hemoglobin A1c 6.2 05/05/2017 01:55 PM    Hemoglobin A1c 7.1 01/31/2017 02:34 PM    Microalb/Creat ratio (ug/mg creat.) 13.7 05/05/2017 01:55 PM    LDL, calculated 106 08/10/2017 12:05 PM    Creatinine (POC) 0.7 01/04/2012 03:21 PM    Creatinine 1.16 08/10/2017 12:05 PM          Discussed with patient goal of Diabetes to include:  HgA1C <7, LDL cholesterol <70, Blood pressure <130/80. Discussed with patient diet and weight management and to get regular exercise. Recommend yearly eye exams and daily foot care. The patient understands and agrees with the plan. I have discussed the diagnosis with the patient and the intended plan as seen in the above orders.   The patient has received an after-visit summary and questions were answered concerning future plans. Medication Side Effects and Warnings were discussed with patient  Patient Labs were reviewed and or requested  Patient Past Records were reviewed and or requested    Archie Daly M.D. [address] [facility]    There are no Patient Instructions on file for this visit.

## 2018-02-05 NOTE — MR AVS SNAPSHOT
[address] [address] 
[phone] Patient: Bruce Reyes 
MRN: [id number]:9/[id number] Visit Information Date & Time Provider Department Dept. Phone Encounter #  
 2/5/2018  9:30 AM Pee Hernandez MD [address] [facility] [phone] [id number] Follow-up Instructions Return in about 3 months (around 5/5/2018) for DM. Upcoming Health Maintenance Date Due Hepatitis C Screening 1965 FOOT EXAM Q1 9/15/1973 EYE EXAM RETINAL OR DILATED Q1 9/15/1973 Pneumococcal 19-64 Medium Risk (1 of 1 - PPSV23) 9/15/1982 DTaP/Tdap/Td series (1 - Tdap) 9/15/1984 FOBT Q 1 YEAR AGE 50-75 9/18/2015 HEMOGLOBIN A1C Q6M 2/10/2018 MICROALBUMIN Q1 5/5/2018 LIPID PANEL Q1 8/10/2018 Allergies as of 2/5/2018  Review Complete On: 2/5/2018 By: Pee Hernandez MD  
  
 Severity Noted Reaction Type Reactions Bee Sting [Sting, Bee] High 09/07/2016    Anaphylaxis Demerol [Meperidine] High 09/07/2016    Anaphylaxis Bee Sting [Sting, Bee]  09/02/2011    Hives Demerol [Meperidine]  08/13/2010    Palpitations Current Immunizations  Reviewed on 9/18/2014 Name Date Influenza Vaccine Split 10/27/2011 Not reviewed this visit You Were Diagnosed With   
  
 Codes Comments Type 2 diabetes mellitus without complication, without long-term current use of insulin (HCC)    -  Primary ICD-10-CM: E11.9 ICD-9-CM: 250.00 Pure hypercholesterolemia     ICD-10-CM: E78.00 ICD-9-CM: 272.0 Essential hypertension, benign     ICD-10-CM: I10 
ICD-9-CM: 197. 1 Chronic neck pain     ICD-10-CM: M54.2, G89.29 ICD-9-CM: 723.1, 338.29 Idiopathic peripheral neuropathy     ICD-10-CM: G60.9 ICD-9-CM: 356.9 Paranoia (psychosis) ([facility] [address]     ICD-10-CM: F22 
ICD-9-CM: 297.1 Schizoaffective disorder, [facility]     ICD-10-CM: F25.0 ICD-9-CM: 295.70 Vitals BP Pulse Temp Resp Height(growth percentile) Weight(growth percentile) 132/82 76 97.8 °F (36.6 °C) (Oral) 17 5' 9\" (1.753 m) 169 lb (76.7 kg) SpO2 BMI Smoking Status 96% 24.96 kg/m2 Current Every Day Smoker Vitals History BMI and BSA Data Body Mass Index Body Surface Area 24.96 kg/m 2 1.93 m 2 Preferred Pharmacy Pharmacy Name Phone [address] [phone] Your Updated Medication List  
  
   
This list is accurate as of: 2/5/18 10:23 AM.  Always use your most recent med list.  
  
  
  
  
 acetaminophen 325 mg tablet Commonly known as:  TYLENOL Take 1 Tab by mouth every four (4) hours as needed for Pain. Indications: MYALGIA  
  
 baclofen 20 mg tablet Commonly known as:  LIORESAL Take 1 Tab by mouth three (3) times daily. Blood-Glucose Meter monitoring kit Check BS once a day. * CYMBALTA 30 mg capsule Generic drug:  DULoxetine Take 30 mg by mouth daily. * DULoxetine 60 mg capsule Commonly known as:  CYMBALTA Take 1 Cap by mouth two (2) times a day. EPINEPHrine 0.3 mg/0.3 mL injection Commonly known as:  EPIPEN  
0.3 mL by IntraMUSCular route once as needed. furosemide 20 mg tablet Commonly known as:  LASIX Take 1 Tab by mouth daily. glucose blood VI test strips strip Commonly known as:  TRUE METRIX GLUCOSE TEST STRIP  
TEST ONCE DAILY  
  
 ibuprofen 800 mg tablet Commonly known as:  MOTRIN Take 1 Tab by mouth every eight (8) hours as needed for Pain. Lancets Misc Check bs once a day. metFORMIN  mg tablet Commonly known as:  GLUCOPHAGE XR  
TAKE 1 TABLET BY MOUTH DAILY WITH DINNER FOR DIABETES  Indications: type 2 diabetes mellitus OTHER Alcohol pads Dx: DM Polyethylene Glycol 3350 Powd 1 Cap by Does Not Apply route daily. potassium chloride 20 mEq tablet Commonly known as:  K-DUR, KLOR-CON Take 1 Tab by mouth daily. pravastatin 40 mg tablet Commonly known as:  PRAVACHOL Take 1 Tab by mouth nightly. propranolol 20 mg tablet Commonly known as:  INDERAL Take 1 Tab by mouth two (2) times a day. * QUEtiapine 300 mg tablet Commonly known as:  SEROquel Take 600 mg by mouth nightly. * QUEtiapine 200 mg tablet Commonly known as:  SEROquel Take 200 mg by mouth every morning. * Notice: This list has 4 medication(s) that are the same as other medications prescribed for you. Read the directions carefully, and ask your doctor or other care provider to review them with you. Prescriptions Printed Refills OTHER 99 Sig: Alcohol pads Dx: DM Class: Print Prescriptions Sent to Pharmacy Refills  
 glucose blood VI test strips (TRUE METRIX GLUCOSE TEST STRIP) strip 99 Sig: TEST ONCE DAILY Class: Normal  
 Pharmacy: [facility] [address] Ph #: [phone]  
 baclofen (LIORESAL) 20 mg tablet 1 Sig: Take 1 Tab by mouth three (3) times daily. Class: Normal  
 Pharmacy: [facility], [address] [facility] [employer] Ph #: [phone] Route: Oral  
 furosemide (LASIX) 20 mg tablet 1 Sig: Take 1 Tab by mouth daily. Class: Normal  
 Pharmacy: [facility], [address] [facility] [employer] Ph #: [phone] Route: Oral  
 propranolol (INDERAL) 20 mg tablet 3 Sig: Take 1 Tab by mouth two (2) times a day. Class: Normal  
 Pharmacy: [facility], [address] [facility] [employer] Ph #: [phone]  Route: Oral  
 metFORMIN ER (GLUCOPHAGE XR) 500 mg tablet 3 Sig: TAKE 1 TABLET BY MOUTH DAILY WITH DINNER FOR DIABETES  Indications: type 2 diabetes mellitus Class: Normal  
 Pharmacy: [facility] [address] Ph #: [phone]  
 potassium chloride (K-DUR, KLOR-CON) 20 mEq tablet 1 Sig: Take 1 Tab by mouth daily. Class: Normal  
 Pharmacy: [facility] [address] [facility] [employer] [place] Ph #: [phone] Route: Oral  
 ibuprofen (MOTRIN) 800 mg tablet 3 Sig: Take 1 Tab by mouth every eight (8) hours as needed for Pain. Class: Normal  
 Pharmacy: [facility] [address] [facility] [employer] Ph #: [phone] Route: Oral  
 EPINEPHrine (EPIPEN) 0.3 mg/0.3 mL injection 0 Si.3 mL by IntraMUSCular route once as needed. Class: Normal  
 Pharmacy: [facility] [address] [facility] [employer] [place] Ph #: [phone] Route: IntraMUSCular Lancets misc 99 Sig: Check bs once a day. Class: Normal  
 Pharmacy: [facility] [address] Ph #: [phone]  
 pravastatin (PRAVACHOL) 40 mg tablet 11 Sig: Take 1 Tab by mouth nightly. Class: Normal  
 Pharmacy: [facility] [address] [facility] [employer] [place] Ph #: [phone] Route: Oral  
  
We Performed the Following HEMOGLOBIN A1C WITH EAG [31798 CPT(R)] LIPID PANEL [55308 CPT(R)] METABOLIC PANEL, COMPREHENSIVE [50170 CPT(R)] REFERRAL TO OPHTHALMOLOGY [REF57 Custom] Follow-up Instructions Return in about 3 months (around 2018) for DM. Referral Information Referral ID Referred By Referred To  
  
 [id number] CECILIA MERLOS Not Available Visits Status Start Date End Date 1 New Request 2/5/18 2/5/19 If your referral has a status of pending review or denied, additional information will be sent to support the outcome of this decision. Introducing [facility]! Gary Dejesus introduces RethinkDB patient portal. Now you can access parts of your medical record, email your doctor's office, and request medication refills online. 1. In your internet browser, go to [email or web address] 2. Click on the First Time User? Click Here link in the Sign In box. You will see the New Member Sign Up page. 3. Enter your RethinkDB Access Code exactly as it appears below. You will not need to use this code after youve completed the sign-up process. If you do not sign up before the expiration date, you must request a new code. · Rethink[employer] Access Code: [email or web address] Expires: 5/6/2018 10:23 AM 
 
4. Enter the last four digits of your Social Security Number (xxxx) and Date of Birth (mm/dd/yyyy) as indicated and click Submit. You will be taken to the next sign-up page. 5. Create a RethinkDB ID. This will be your RethinkDB login ID and cannot be changed, so think of one that is secure and easy to remember. 6. Create a RethinkDB password. You can change your password at any time. 7. Enter your Password Reset Question and Answer. This can be used at a later time if you forget your password. 8. Enter your e-mail address. You will receive e-mail notification when new information is available in [address] 9. Click Sign Up. You can now view and download portions of your medical record. 10. Click the Download Summary menu link to download a portable copy of your medical information. If you have questions, please visit the Frequently Asked Questions section of the RethinkDB website.  Remember, RethinkDB is NOT to be used for urgent needs. For medical emergencies, dial 911. Now available from your iPhone and Android! Please provide this summary of care documentation to your next provider. Your primary care clinician is listed as CECILIA MERLOS. If you have any questions after today's visit, please call [phone].

## 2018-02-06 ENCOUNTER — TELEPHONE (OUTPATIENT)
Dept: FAMILY MEDICINE CLINIC | Age: 53
End: 2018-02-06

## 2018-02-06 LAB
ALBUMIN SERPL-MCNC: 4.7 G/DL (ref 3.5–5.5)
ALBUMIN/GLOB SERPL: 2.2 {RATIO} (ref 1.2–2.2)
ALP SERPL-CCNC: 114 IU/L (ref 39–117)
ALT SERPL-CCNC: 14 IU/L (ref 0–44)
AST SERPL-CCNC: 12 IU/L (ref 0–40)
BILIRUB SERPL-MCNC: <0.2 MG/DL (ref 0–1.2)
BUN SERPL-MCNC: 20 MG/DL (ref 6–24)
BUN/CREAT SERPL: 19 (ref 9–20)
CALCIUM SERPL-MCNC: 9 MG/DL (ref 8.7–10.2)
CHLORIDE SERPL-SCNC: 101 MMOL/L (ref 96–106)
CHOLEST SERPL-MCNC: 220 MG/DL (ref 100–199)
CO2 SERPL-SCNC: 18 MMOL/L (ref 18–29)
CREAT SERPL-MCNC: 1.08 MG/DL (ref 0.76–1.27)
EST. AVERAGE GLUCOSE BLD GHB EST-MCNC: 169 MG/DL
GFR SERPLBLD CREATININE-BSD FMLA CKD-EPI: 77 ML/MIN/1.73
GFR SERPLBLD CREATININE-BSD FMLA CKD-EPI: 89 ML/MIN/1.73
GLOBULIN SER CALC-MCNC: 2.1 G/DL (ref 1.5–4.5)
GLUCOSE SERPL-MCNC: 125 MG/DL (ref 65–99)
HBA1C MFR BLD: 7.5 % (ref 4.8–5.6)
HDLC SERPL-MCNC: 24 MG/DL
INTERPRETATION, 910389: NORMAL
LDLC SERPL CALC-MCNC: 124 MG/DL (ref 0–99)
Lab: NORMAL
POTASSIUM SERPL-SCNC: 4.5 MMOL/L (ref 3.5–5.2)
PROT SERPL-MCNC: 6.8 G/DL (ref 6–8.5)
SODIUM SERPL-SCNC: 138 MMOL/L (ref 134–144)
TRIGL SERPL-MCNC: 360 MG/DL (ref 0–149)
VLDLC SERPL CALC-MCNC: 72 MG/DL (ref 5–40)

## 2018-02-06 NOTE — TELEPHONE ENCOUNTER
Left message. Need to discuss lab results. Pt needs cholesterol medication and to increase metformin to 2 tabs.

## 2018-02-06 NOTE — TELEPHONE ENCOUNTER
----- Message from Margaret Barros NP sent at 2/6/2018  8:00 AM EST -----      ----- Message -----     From: Simone Marvin Lab Results In     Sent: 2/6/2018   5:39 AM       To: Ann Flanagan MD

## 2018-02-12 ENCOUNTER — DOCUMENTATION ONLY (OUTPATIENT)
Dept: FAMILY MEDICINE CLINIC | Age: 53
End: 2018-02-12

## 2018-02-12 NOTE — PROGRESS NOTES
Freida order for diabetic testing supplies was placed on Dr Smith [facility] desk to process

## 2018-02-15 ENCOUNTER — DOCUMENTATION ONLY (OUTPATIENT)
Dept: FAMILY MEDICINE CLINIC | Age: 53
End: 2018-02-15

## 2018-02-19 RX ORDER — ISOPROPYL ALCOHOL 0.75 G/1
SWAB TOPICAL
Qty: 100 PAD | Refills: 99 | Status: SHIPPED | OUTPATIENT
Start: 2018-02-19 | End: 2020-07-24

## 2018-02-20 ENCOUNTER — DOCUMENTATION ONLY (OUTPATIENT)
Dept: FAMILY MEDICINE CLINIC | Age: 53
End: 2018-02-20

## 2018-02-20 NOTE — PROGRESS NOTES
[facility] diabetic form for lancets completed and faxed to [phone], confirmed, scanned.

## 2018-02-26 ENCOUNTER — DOCUMENTATION ONLY (OUTPATIENT)
Dept: FAMILY MEDICINE CLINIC | Age: 53
End: 2018-02-26

## 2018-02-26 NOTE — PROGRESS NOTES
[place] diabetic supply order was put on Dr Smith \[facility]\"" desk to process

## 2018-03-02 DIAGNOSIS — G89.29 CHRONIC NECK PAIN: ICD-10-CM

## 2018-03-02 DIAGNOSIS — M54.2 CHRONIC NECK PAIN: ICD-10-CM

## 2018-03-02 RX ORDER — BACLOFEN 20 MG/1
TABLET ORAL
Qty: 270 TAB | Refills: 0 | Status: SHIPPED | OUTPATIENT
Start: 2018-03-02 | End: 2018-05-25 | Stop reason: SDUPTHER

## 2018-03-23 ENCOUNTER — TELEPHONE (OUTPATIENT)
Dept: FAMILY MEDICINE CLINIC | Age: 53
End: 2018-03-23

## 2018-03-23 NOTE — TELEPHONE ENCOUNTER
Left voicemail. Called to discuss diabetes management. Pt enrolled in Diabetes Full [place] plan.

## 2018-04-16 ENCOUNTER — TELEPHONE (OUTPATIENT)
Dept: FAMILY MEDICINE CLINIC | Age: 53
End: 2018-04-16

## 2018-04-16 DIAGNOSIS — E11.9 TYPE 2 DIABETES MELLITUS WITHOUT COMPLICATION, WITHOUT LONG-TERM CURRENT USE OF INSULIN (HCC): Primary | ICD-10-CM

## 2018-04-16 RX ORDER — METFORMIN HYDROCHLORIDE 500 MG/1
1000 TABLET, EXTENDED RELEASE ORAL
Qty: 180 TAB | Refills: 0 | Status: SHIPPED | OUTPATIENT
Start: 2018-04-16 | End: 2018-05-25 | Stop reason: SDUPTHER

## 2018-04-16 RX ORDER — INSULIN PUMP SYRINGE, 3 ML
EACH MISCELLANEOUS
Qty: 1 KIT | Refills: 0 | Status: SHIPPED | OUTPATIENT
Start: 2018-04-16

## 2018-04-16 RX ORDER — LANCETS
EACH MISCELLANEOUS
Qty: 1 EACH | Refills: 11 | Status: SHIPPED | OUTPATIENT
Start: 2018-04-16 | End: 2018-05-25 | Stop reason: SDUPTHER

## 2018-04-16 NOTE — TELEPHONE ENCOUNTER
Left voicemail. Called to discuss diabetes management. Pt enrolled in Diabetes Full [place] plan. Pt due for labs on 5/5/2018-needs an appointment.

## 2018-05-16 ENCOUNTER — TELEPHONE (OUTPATIENT)
Dept: FAMILY MEDICINE CLINIC | Age: 53
End: 2018-05-16

## 2018-05-16 NOTE — TELEPHONE ENCOUNTER
TP #13  Spoke w/ pt as part of Diabetes Full [place] outreach program.     Pt is doing well overall. He is taking all medications. He has made diet changes. Pt will call back and make an appointment once he knows his schedule. He is able to verbalize goal A1c.

## 2018-05-25 ENCOUNTER — OFFICE VISIT (OUTPATIENT)
Dept: FAMILY MEDICINE CLINIC | Age: 53
End: 2018-05-25

## 2018-05-25 VITALS
BODY MASS INDEX: 25.09 KG/M2 | DIASTOLIC BLOOD PRESSURE: 78 MMHG | OXYGEN SATURATION: 98 % | HEIGHT: 69 IN | HEART RATE: 67 BPM | RESPIRATION RATE: 16 BRPM | SYSTOLIC BLOOD PRESSURE: 119 MMHG | WEIGHT: 169.4 LBS | TEMPERATURE: 98 F

## 2018-05-25 DIAGNOSIS — E11.9 TYPE 2 DIABETES MELLITUS WITHOUT COMPLICATION, WITHOUT LONG-TERM CURRENT USE OF INSULIN (HCC): ICD-10-CM

## 2018-05-25 DIAGNOSIS — E78.00 PURE HYPERCHOLESTEROLEMIA: ICD-10-CM

## 2018-05-25 DIAGNOSIS — M50.30 DEGENERATIVE CERVICAL DISC: ICD-10-CM

## 2018-05-25 DIAGNOSIS — Z11.59 SCREENING FOR VIRAL DISEASE: ICD-10-CM

## 2018-05-25 DIAGNOSIS — Z00.00 INITIAL MEDICARE ANNUAL WELLNESS VISIT: ICD-10-CM

## 2018-05-25 DIAGNOSIS — I10 ESSENTIAL HYPERTENSION, BENIGN: ICD-10-CM

## 2018-05-25 DIAGNOSIS — E11.9 TYPE 2 DIABETES MELLITUS WITHOUT COMPLICATION, WITHOUT LONG-TERM CURRENT USE OF INSULIN (HCC): Primary | ICD-10-CM

## 2018-05-25 LAB
LEFT EYE DIABETIC RETINOPATHY: NORMAL
LEFT EYE IMAGE QUALITY: NORMAL
LEFT EYE MACULAR EDEMA: NORMAL
LEFT EYE OTHER RETINOPATHY: NORMAL
RESULT: NORMAL
RIGHT EYE DIABETIC RETINOPATHY: NORMAL
RIGHT EYE IMAGE QUALITY: NORMAL
RIGHT EYE MACULAR EDEMA: NORMAL
RIGHT EYE OTHER RETINOPATHY: NORMAL
SEVERITY: NORMAL

## 2018-05-25 RX ORDER — LANCETS
EACH MISCELLANEOUS
Qty: 1 EACH | Refills: 11 | Status: SHIPPED | OUTPATIENT
Start: 2018-05-25 | End: 2019-01-22 | Stop reason: SDUPTHER

## 2018-05-25 RX ORDER — CYCLOBENZAPRINE HCL 10 MG
10 TABLET ORAL
Qty: 270 TAB | Refills: 3 | Status: SHIPPED | OUTPATIENT
Start: 2018-05-25 | End: 2018-12-04 | Stop reason: SDUPTHER

## 2018-05-25 RX ORDER — PRAVASTATIN SODIUM 40 MG/1
40 TABLET ORAL
Qty: 90 TAB | Refills: 3 | Status: SHIPPED | OUTPATIENT
Start: 2018-05-25 | End: 2018-08-23 | Stop reason: ALTCHOICE

## 2018-05-25 NOTE — MR AVS SNAPSHOT
[address] [address] 
[phone] Patient: Parul Bull 
MRN: [id number]:[id number] Visit Information Date & Time Provider Department Dept. Phone Encounter #  
 5/25/2018 10:20 AM Rogelio Morales, [address] [phone] [id number] Upcoming Health Maintenance Date Due Hepatitis C Screening 1965 FOOT EXAM Q1 9/15/1973 EYE EXAM RETINAL OR DILATED Q1 9/15/1973 Pneumococcal 19-64 Medium Risk (1 of 1 - PPSV23) 9/15/1982 DTaP/Tdap/Td series (1 - Tdap) 9/15/1984 FOBT Q 1 YEAR AGE 50-75 9/18/2015 MEDICARE YEARLY EXAM 3/28/2018 MICROALBUMIN Q1 5/5/2018 Influenza Age 5 to Adult 8/1/2018 HEMOGLOBIN A1C Q6M 8/5/2018 LIPID PANEL Q1 2/5/2019 Allergies as of 5/25/2018  Review Complete On: 5/25/2018 By: Rogelio Morales,  Severity Noted Reaction Type Reactions Bee Sting [Sting, Bee] High 09/07/2016    Anaphylaxis Demerol [Meperidine] High 09/07/2016    Anaphylaxis Bee Sting [Sting, Bee]  09/02/2011    Hives Demerol [Meperidine]  08/13/2010    Palpitations Current Immunizations  Reviewed on 9/18/2014 Name Date Influenza Vaccine Split 10/27/2011 Not reviewed this visit You Were Diagnosed With   
  
 Codes Comments Type 2 diabetes mellitus without complication, without long-term current use of insulin (HCC)    -  Primary ICD-10-CM: E11.9 ICD-9-CM: 250.00 Degenerative cervical disc     ICD-10-CM: M50.30 ICD-9-CM: 722.4 Screening for viral disease     ICD-10-CM: Z11.59 
ICD-9-CM: V73.99 Pure hypercholesterolemia     ICD-10-CM: E78.00 ICD-9-CM: 272.0 Initial Medicare annual wellness visit     ICD-10-CM: Z00.00 ICD-9-CM: V70.0 Vitals BP Pulse Temp Resp Height(growth percentile) Weight(growth percentile)  119/78 (BP 1 Location: Left arm, BP Patient Position: Sitting) 67 98 °F (36.7 °C) (Oral) 16 5' 9\" (1.753 m) 169 lb 6.4 oz (76.8 kg) SpO2 BMI Smoking Status 98% 25.02 kg/m2 Current Every Day Smoker BMI and BSA Data Body Mass Index Body Surface Area 25.02 kg/m 2 1.93 m 2 Preferred Pharmacy Pharmacy Name Phone [address] [facility], [address] [facility] [address] [facility] [phone] Your Updated Medication List  
  
   
This list is accurate as of 5/25/18 10:44 AM.  Always use your most recent med list.  
  
  
  
  
 acetaminophen 325 mg tablet Commonly known as:  TYLENOL Take 1 Tab by mouth every four (4) hours as needed for Pain. Indications: MYALGIA  
  
 BD Single Use Swabs Regular Padm Generic drug:  alcohol swabs USE TO TEST EVERY DAY * Blood-Glucose Meter monitoring kit Check BS once a day. * Blood-Glucose Meter monitoring kit One Touch Verio; test daily; dx: E11.9  
  
 cyclobenzaprine 10 mg tablet Commonly known as:  FLEXERIL Take 1 Tab by mouth three (3) times daily as needed for Muscle Spasm(s). D/c baclofen * CYMBALTA 30 mg capsule Generic drug:  DULoxetine Take 30 mg by mouth daily. * DULoxetine 60 mg capsule Commonly known as:  CYMBALTA Take 1 Cap by mouth two (2) times a day. EPINEPHrine 0.3 mg/0.3 mL injection Commonly known as:  EPIPEN  
0.3 mL by IntraMUSCular route once as needed. furosemide 20 mg tablet Commonly known as:  LASIX Take 1 Tab by mouth daily. glucose blood VI test strips strip Commonly known as:  ASCENSIA AUTODISC VI, ONE TOUCH ULTRA TEST VI Test daily; dx: E11.9; One Touch Verio  
  
 ibuprofen 800 mg tablet Commonly known as:  MOTRIN Take 1 Tab by mouth every eight (8) hours as needed for Pain. * Lancets Misc Check bs once a day. * Lancets Misc Test daily; one touch verio; dx: E11.9  
  
 metFORMIN  mg tablet Commonly known as:  GLUCOPHAGE XR  
 Take 2 Tabs by mouth daily (with dinner). Indications: type 2 diabetes mellitus OTHER Alcohol pads Dx: DM Polyethylene Glycol 3350 Powd 1 Cap by Does Not Apply route daily. potassium chloride 20 mEq tablet Commonly known as:  K-DUR, KLOR-CON Take 1 Tab by mouth daily. pravastatin 40 mg tablet Commonly known as:  PRAVACHOL Take 1 Tab by mouth nightly. propranolol 20 mg tablet Commonly known as:  INDERAL Take 1 Tab by mouth two (2) times a day. * QUEtiapine 300 mg tablet Commonly known as:  SEROquel Take 600 mg by mouth nightly. * QUEtiapine 200 mg tablet Commonly known as:  SEROquel Take 200 mg by mouth every morning. * Notice: This list has 8 medication(s) that are the same as other medications prescribed for you. Read the directions carefully, and ask your doctor or other care provider to review them with you. Prescriptions Sent to Pharmacy Refills  
 cyclobenzaprine (FLEXERIL) 10 mg tablet 3 Sig: Take 1 Tab by mouth three (3) times daily as needed for Muscle Spasm(s). D/c baclofen Class: Normal  
 Pharmacy: [facility] [address], [address] Ph #: [phone] Route: Oral  
 Lancets misc 11 Sig: Test daily; one touch verio; dx: E11.9 Class: Normal  
 Pharmacy: [facility] [address]  Ph #: [phone]  
 pravastatin (PRAVACHOL) 40 mg tablet 3 Sig: Take 1 Tab by mouth nightly. Class: Normal  
 Pharmacy: [facility] [address], [address] Ph #: [phone] Route: Oral  
  
We Performed the Following FUNDUS PHOTOGRAPHY E6340267 CPT(R)] HEMOGLOBIN A1C WITH EAG [19443 CPT(R)] HEPATITIS C AB [26503 CPT(R)] LIPID PANEL [18739 CPT(R)] METABOLIC PANEL, COMPREHENSIVE [77175 CPT(R)] MICROALBUMIN, UR, RAND W/ MICROALB/CREAT RATIO [id number] CPT(R)] Patient Instructions Medicare Wellness Visit, Male The best way to live healthy is to have a lifestyle where you eat a well-balanced diet, exercise regularly, limit alcohol use, and quit all forms of tobacco/nicotine, if applicable. Regular preventive services are another way to keep healthy. Preventive services (vaccines, screening tests, monitoring & exams) can help personalize your care plan, which helps you manage your own care. Screening tests can find health problems at the earliest stages, when they are easiest to treat. [address] [facility] follows the current, evidence-based guidelines published by the [facility] Marcio Springer ([facility]) when recommending preventive services for our patients. Because we follow these guidelines, sometimes recommendations change over time as research supports it. (For example, a prostate screening blood test is no longer routinely recommended for men with no symptoms.) Of course, you and your provider may decide to screen more often for some diseases, based on your risk and co-morbidities (chronic disease you are already diagnosed with). Preventive services for you include: - Medicare offers their members a free annual wellness visit, which is time for you and your primary care provider to discuss and plan for your preventive service needs. Take advantage of this benefit every year! 
 
-All people over age 72 should receive the recommended pneumonia vaccines. Current USPSTF guidelines recommend a series of two vaccines for the best pneumonia protection.  
 
-All adults should have a yearly flu vaccine and a tetanus vaccine every 10 years.  All adults age 61 years should receive a shingles vaccine once in their lifetime.   
 
-All adults age 38-68 years who are overweight should have a diabetes screening test once every three years.  
 
-Other screening tests & preventive services for persons with diabetes include: an eye exam to screen for diabetic retinopathy, a kidney function test, a foot exam, and stricter control over your cholesterol.  
 
-Cardiovascular screening for adults with routine risk involves an electrocardiogram (ECG) at intervals determined by the provider.  
 
-Colorectal cancer screenings should be done for adults age 54-65 years with normal risk. There are a number of acceptable methods of screening for this type of cancer. Each test has its own benefits and drawbacks. Discuss with your provider what is most appropriate for you during your annual wellness visit. The different tests include: colonoscopy (considered the best screening method), a fecal occult blood test, a fecal DNA test, and sigmoidoscopy. 
 
-All adults born between [facility] should be screened once for Hepatitis C. 
 
-An Abdominal Aortic Aneurysm (AAA) Screening is recommended for men age 73-68 who has ever smoked in their lifetime. Here is a list of your current Health Maintenance items (your personalized list of preventive services) with a due date: 
Health Maintenance Due Topic Date Due  
 Hepatitis C Test  [address] [facility] [facility] Diabetic Foot Care  09/15/1973 [facility] Eye Exam  09/15/1973  Pneumococcal Vaccine (1 of 1 - PPSV23) 09/15/1982  
 DTaP/Tdap/Td  (1 - Tdap) 09/15/1984  Stool testing for trace blood  09/18/2015 [facility] Annual Well Visit  03/28/2018  Albumin Urine Test  05/05/2018 Introducing [facility]! [facility] introduces [employer] patient portal. Now you can access parts of your medical record, email your doctor's office, and request medication refills online. 1. In your internet browser, go to [email or web address] 2. Click on the First Time User? Click Here link in the Sign In box. You will see the New Member Sign Up page. 3. Enter your Mapado Access Code exactly as it appears below. You will not need to use this code after youve completed the sign-up process.  If you do not sign up before the expiration date, you must request a new code. · Only [employer] Access Code: [email or web address] Expires: 8/23/2018 10:44 AM 
 
4. Enter the last four digits of your Social Security Number (xxxx) and Date of Birth (mm/dd/yyyy) as indicated and click Submit. You will be taken to the next sign-up page. 5. Create a Only [employer] ID. This will be your Only [employer] login ID and cannot be changed, so think of one that is secure and easy to remember. 6. Create a [employer] password. You can change your password at any time. 7. Enter your Password Reset Question and Answer. This can be used at a later time if you forget your password. 8. Enter your e-mail address. You will receive e-mail notification when new information is available in [address] 9. Click Sign Up. You can now view and download portions of your medical record. 10. Click the Download Summary menu link to download a portable copy of your medical information. If you have questions, please visit the Frequently Asked Questions section of the [employer] website. Remember, Only [employer] is NOT to be used for urgent needs. For medical emergencies, dial 911. Now available from your iPhone and Android! Please provide this summary of care documentation to your next provider. Your primary care clinician is listed as CECILIA MERLOS. If you have any questions after today's visit, please call [phone].

## 2018-05-25 NOTE — PATIENT INSTRUCTIONS
Medicare Wellness Visit, Male    The best way to live healthy is to have a lifestyle where you eat a well-balanced diet, exercise regularly, limit alcohol use, and quit all forms of tobacco/nicotine, if applicable. Regular preventive services are another way to keep healthy. Preventive services (vaccines, screening tests, monitoring & exams) can help personalize your care plan, which helps you manage your own care. Screening tests can find health problems at the earliest stages, when they are easiest to treat. [facility] follows the current, evidence-based guidelines published by the [facility] ([facility]) when recommending preventive services for our patients. Because we follow these guidelines, sometimes recommendations change over time as research supports it. (For example, a prostate screening blood test is no longer routinely recommended for men with no symptoms.)    Of course, you and your provider may decide to screen more often for some diseases, based on your risk and co-morbidities (chronic disease you are already diagnosed with). Preventive services for you include:    - Medicare offers their members a free annual wellness visit, which is time for you and your primary care provider to discuss and plan for your preventive service needs. Take advantage of this benefit every year!    -All people over age 72 should receive the recommended pneumonia vaccines. Current USPSTF guidelines recommend a series of two vaccines for the best pneumonia protection.     -All adults should have a yearly flu vaccine and a tetanus vaccine every 10 years.  All adults age 61 years should receive a shingles vaccine once in their lifetime.      -All adults age 38-68 years who are overweight should have a diabetes screening test once every three years.     -Other screening tests & preventive services for persons with diabetes include: an eye exam to screen for diabetic retinopathy, a kidney function test, a foot exam, and stricter control over your cholesterol.     -Cardiovascular screening for adults with routine risk involves an electrocardiogram (ECG) at intervals determined by the provider.     -Colorectal cancer screenings should be done for adults age 54-65 years with normal risk. There are a number of acceptable methods of screening for this type of cancer. Each test has its own benefits and drawbacks. Discuss with your provider what is most appropriate for you during your annual wellness visit. The different tests include: colonoscopy (considered the best screening method), a fecal occult blood test, a fecal DNA test, and sigmoidoscopy.    -All adults born between [facility] should be screened once for Hepatitis C.    -An Abdominal Aortic Aneurysm (AAA) Screening is recommended for men age 73-68 who has ever smoked in their lifetime. Here is a list of your current Health Maintenance items (your personalized list of preventive services) with a due date:  Health Maintenance Due   Topic Date Due    Hepatitis C Test  1965    Diabetic Foot Care  09/15/1973    Eye Exam  09/15/1973    Pneumococcal Vaccine (1 of 1 - PPSV23) 09/15/1982    DTaP/Tdap/Td  (1 - Tdap) 09/15/1984    Stool testing for trace blood  09/18/2015    Annual Well Visit  03/28/2018    Albumin Urine Test  05/05/2018            Body Mass Index: Care Instructions  Your Care Instructions    Body mass index (BMI) can help you see if your weight is raising your risk for health problems. It uses a formula to compare how much you weigh with how tall you are. · A BMI lower than 18.5 is considered underweight. · A BMI between 18.5 and 24.9 is considered healthy. · A BMI between 25 and 29.9 is considered overweight. A BMI of 30 or higher is considered obese. If your BMI is in the normal range, it means that you have a lower risk for weight-related health problems.  If your BMI is in the overweight or obese range, you may be at increased risk for weight-related health problems, such as high blood pressure, heart disease, stroke, arthritis or joint pain, and diabetes. If your BMI is in the underweight range, you may be at increased risk for health problems such as fatigue, lower protection (immunity) against illness, muscle loss, bone loss, hair loss, and hormone problems. BMI is just one measure of your risk for weight-related health problems. You may be at higher risk for health problems if you are not active, you eat an unhealthy diet, or you drink too much alcohol or use tobacco products. Follow-up care is a key part of your treatment and safety. Be sure to make and go to all appointments, and call your doctor if you are having problems. It's also a good idea to know your test results and keep a list of the medicines you take. How can you care for yourself at home? · Practice healthy eating habits. This includes eating plenty of fruits, vegetables, whole grains, lean protein, and low-fat dairy. · If your doctor recommends it, get more exercise. Walking is a good choice. Bit by bit, increase the amount you walk every day. Try for at least 30 minutes on most days of the week. · Do not smoke. Smoking can increase your risk for health problems. If you need help quitting, talk to your doctor about stop-smoking programs and medicines. These can increase your chances of quitting for good. · Limit alcohol to 2 drinks a day for men and 1 drink a day for women. Too much alcohol can cause health problems. If you have a BMI higher than 25  · Your doctor may do other tests to check your risk for weight-related health problems. This may include measuring the distance around your waist. A waist measurement of more than 40 inches in men or 35 inches in women can increase the risk of weight-related health problems. · Talk with your doctor about steps you can take to stay healthy or improve your health.  You may need to make lifestyle changes to lose weight and stay healthy, such as changing your diet and getting regular exercise. If you have a BMI lower than 18.5  · Your doctor may do other tests to check your risk for health problems. · Talk with your doctor about steps you can take to stay healthy or improve your health. You may need to make lifestyle changes to gain or maintain weight and stay healthy, such as getting more healthy foods in your diet and doing exercises to build muscle. Where can you learn more? Go to http://celia-milton.info/. Enter S176 in the search box to learn more about \"Body Mass Index: Care Instructions. \"  Current as of: October 13, 2016  Content Version: 11.4  © 1219-8589 Healthwise, [employer]. Care instructions adapted under license by [employer] (which disclaims liability or warranty for this information). If you have questions about a medical condition or this instruction, always ask your healthcare professional. [address] any warranty or liability for your use of this information.

## 2018-05-25 NOTE — PROGRESS NOTES
Jeff Peralta is a 45 y.o. male   Chief Complaint   Patient presents with    Medication Refill    Labs     Pt states no food intake this morning. pt ehre for diabetes check. Diet has improved and is taking the metformin. Last A1C was 7.5. Pt med was increased after last elevation. Pt is not very active. States not much exercise and knows he should work on this. Pt would alsol damien to change his muscle relaxer from baclofen states not working verywell for his fibro, discussed change to flexeril and pt agreeable to this. Pt also with HLD and has stopped the pravastatin because he felt he did not need this and after diascdussion he is willing to restart. Discussed checking PSA level and pt does not want this checked and is aware of risks of prostate cancer. Pt with mild wheezes b/l and is an active smoker, denies any issues with his breathing, refuses CXR and PFT's pt reports no need for inahler, pt made aware of risks assocviated with refusing further work up and maintains his position. he is a 45y.o. year old male who presents for evalution. Reviewed PmHx, RxHx, FmHx, SocHx, AllgHx and updated and dated in the chart. Review of Systems - negative except as listed above in the HPI    Objective:     Vitals:    05/25/18 1007   BP: 119/78   Pulse: 67   Resp: 16   Temp: 98 °F (36.7 °C)   TempSrc: Oral   SpO2: 98%   Weight: 169 lb 6.4 oz (76.8 kg)   Height: 5' 9\" (1.753 m)       Current Outpatient Prescriptions   Medication Sig    cyclobenzaprine (FLEXERIL) 10 mg tablet Take 1 Tab by mouth three (3) times daily as needed for Muscle Spasm(s). D/c baclofen    Lancets misc Test daily; one touch verio; dx: E11.9    pravastatin (PRAVACHOL) 40 mg tablet Take 1 Tab by mouth nightly.  Blood-Glucose Meter monitoring kit One Touch Verio; test daily; dx: E11.9    glucose blood VI test strips (ASCENSIA AUTODISC VI, ONE TOUCH ULTRA TEST VI) strip Test daily; dx: E11.9;  One Touch Verio  metFORMIN ER (GLUCOPHAGE XR) 500 mg tablet Take 2 Tabs by mouth daily (with dinner). Indications: type 2 diabetes mellitus    BD SINGLE USE SWABS REGULAR padm USE TO TEST EVERY DAY    DULoxetine (CYMBALTA) 30 mg capsule Take 30 mg by mouth daily.  furosemide (LASIX) 20 mg tablet Take 1 Tab by mouth daily.  propranolol (INDERAL) 20 mg tablet Take 1 Tab by mouth two (2) times a day.  potassium chloride (K-DUR, KLOR-CON) 20 mEq tablet Take 1 Tab by mouth daily.  ibuprofen (MOTRIN) 800 mg tablet Take 1 Tab by mouth every eight (8) hours as needed for Pain.  EPINEPHrine (EPIPEN) 0.3 mg/0.3 mL injection 0.3 mL by IntraMUSCular route once as needed.  Lancets misc Check bs once a day.  OTHER Alcohol pads  Dx: DM    DULoxetine (CYMBALTA) 60 mg capsule Take 1 Cap by mouth two (2) times a day.  Blood-Glucose Meter monitoring kit Check BS once a day.  QUEtiapine (SEROQUEL) 300 mg tablet Take 600 mg by mouth nightly.  QUEtiapine (SEROQUEL) 200 mg tablet Take 200 mg by mouth every morning.  Polyethylene Glycol 3350 powd 1 Cap by Does Not Apply route daily.  acetaminophen (TYLENOL) 325 mg tablet Take 1 Tab by mouth every four (4) hours as needed for Pain. Indications: MYALGIA     No current facility-administered medications for this visit. Physical Examination: General appearance - alert, well appearing, and in no distress  Mental status - alert, oriented to person, place, and time  Eyes - pupils equal and reactive, extraocular eye movements intact  Chest - mild wheezes bilateral  Heart - normal rate, regular rhythm, normal S1, S2, no murmurs, rubs, clicks or gallops  Abdomen - soft, nontender, nondistended, no masses or organomegaly      Assessment/ Plan:   Diagnoses and all orders for this visit:    1.  Type 2 diabetes mellitus without complication, without long-term current use of insulin (HCC)  -     HEMOGLOBIN A1C WITH EAG  -     MICROALBUMIN, UR, RAND W/ MICROALB/CREAT RATIO  -     METABOLIC PANEL, COMPREHENSIVE  -     LIPID PANEL  -     FUNDUS PHOTOGRAPHY  -     Lancets misc; Test daily; one touch verio; dx: E11.9    2. Degenerative cervical disc  -     cyclobenzaprine (FLEXERIL) 10 mg tablet; Take 1 Tab by mouth three (3) times daily as needed for Muscle Spasm(s). D/c baclofen    3. Screening for viral disease  -     HEPATITIS C AB    4. Pure hypercholesterolemia  -     pravastatin (PRAVACHOL) 40 mg tablet; Take 1 Tab by mouth nightly. 5. Initial Medicare annual wellness visit    6. Essential hypertension, benign     bp well controlled on medication  Follow-up Disposition:  Return in about 3 months (around 8/25/2018), or if symptoms worsen or fail to improve. I have discussed the diagnosis with the patient and the intended plan as seen in the above orders. The patient has received an after-visit summary and questions were answered concerning future plans. Pt conveyed understanding of plan. Medication Side Effects and Warnings were discussed with patient      John Paniagua, DO         This is an Initial Medicare Annual Wellness Exam (AWV) (Performed 12 months after IPPE or effective date of Medicare Part B enrollment, Once in a lifetime)    I have reviewed the patient's medical history in detail and updated the computerized patient record.      History     Past Medical History:   Diagnosis Date    CAD (coronary artery disease) 2003    MI    Cervical spondylosis 6/26/2014    Chronic pain     Chronic pain     Left Shoulder pain    Hypertension     MI (myocardial infarction) [facility])     age 38    Other ill-defined conditions(799.89)     reflux sympathetic dystrophy    Psychiatric disorder     Major Depression/Anxiety    Psychotic disorder     Seizures ([facility] [address]     Type 2 diabetes mellitus without complication, without long-term current use of insulin ([facility] [address] 2/2/2017      Past Surgical History:   Procedure Laterality Date    COLONOSCOPY N/A 9/9/2016    COLONOSCOPY performed by Dale Henry MD at [address] [facility] HX ORTHOPAEDIC      replacement of C3    HX ORTHOPAEDIC      C-Spine herniated disc repair (C3)     Current Outpatient Prescriptions   Medication Sig Dispense Refill    cyclobenzaprine (FLEXERIL) 10 mg tablet Take 1 Tab by mouth three (3) times daily as needed for Muscle Spasm(s). D/c baclofen 270 Tab 3    Lancets misc Test daily; one touch verio; dx: E11.9 1 Each 11    pravastatin (PRAVACHOL) 40 mg tablet Take 1 Tab by mouth nightly. 90 Tab 3    Blood-Glucose Meter monitoring kit One Touch Verio; test daily; dx: E11.9 1 Kit 0    glucose blood VI test strips (ASCENSIA AUTODISC VI, ONE TOUCH ULTRA TEST VI) strip Test daily; dx: E11.9; One Touch Verio 100 Strip 0    metFORMIN ER (GLUCOPHAGE XR) 500 mg tablet Take 2 Tabs by mouth daily (with dinner). Indications: type 2 diabetes mellitus 180 Tab 0    BD SINGLE USE SWABS REGULAR padm USE TO TEST EVERY  Pad 99    DULoxetine (CYMBALTA) 30 mg capsule Take 30 mg by mouth daily.  furosemide (LASIX) 20 mg tablet Take 1 Tab by mouth daily. 90 Tab 1    propranolol (INDERAL) 20 mg tablet Take 1 Tab by mouth two (2) times a day. 180 Tab 3    potassium chloride (K-DUR, KLOR-CON) 20 mEq tablet Take 1 Tab by mouth daily. 90 Tab 1    ibuprofen (MOTRIN) 800 mg tablet Take 1 Tab by mouth every eight (8) hours as needed for Pain. 300 Tab 3    EPINEPHrine (EPIPEN) 0.3 mg/0.3 mL injection 0.3 mL by IntraMUSCular route once as needed. 2 Syringe 0    Lancets misc Check bs once a day. 50 Each 99    OTHER Alcohol pads  Dx:  Each 99    DULoxetine (CYMBALTA) 60 mg capsule Take 1 Cap by mouth two (2) times a day. 60 Cap 11    Blood-Glucose Meter monitoring kit Check BS once a day. 1 Kit 0    QUEtiapine (SEROQUEL) 300 mg tablet Take 600 mg by mouth nightly.  QUEtiapine (SEROQUEL) 200 mg tablet Take 200 mg by mouth every morning.  Polyethylene Glycol 3350 powd 1 Cap by Does Not Apply route daily.       acetaminophen (TYLENOL) 325 mg tablet Take 1 Tab by mouth every four (4) hours as needed for Pain. Indications: MYALGIA 100 Tab 3     Allergies   Allergen Reactions    Bee Sting [Sting, Bee] Anaphylaxis    Demerol [Meperidine] Anaphylaxis    Bee Sting [Sting, Bee] Hives    Demerol [Meperidine] Palpitations     Family History   Problem Relation Age of Onset    Hypertension Mother     Heart Disease Mother     Stroke Mother      Social History   Substance Use Topics    Smoking status: Current Every Day Smoker     Packs/day: 1.00    Smokeless tobacco: Never Used    Alcohol use No     Patient Active Problem List   Diagnosis Code    Schizoaffective disorder ([facility]) F25.9    Paranoia (psychosis) ([facility] [address] F22    Delusions of persecution     RSD (reflex sympathetic dystrophy) G90.50    Hyperlipidemia E78.5    History of MI (myocardial infarction) I25.2    Right hand pain M79.641    Pain in both feet M79.671, M79.672    Peripheral neuropathy G62.9    Idiopathic peripheral neuropathy G60.9    Chronic neck pain M54.2, G89.29    Cervical spondylosis M47.812    Degenerative cervical disc M50.30    Essential hypertension, benign I10    Type 2 diabetes mellitus without complication, without long-term current use of insulin ([facility]) E11.9       Depression Risk Factor Screening:     PHQ over the last two weeks 8/10/2017   Little interest or pleasure in doing things Not at all   Feeling down, depressed or hopeless Not at all   Total Score PHQ 2 0     Alcohol Risk Factor Screening: You do not drink alcohol or very rarely. Functional Ability and Level of Safety:     Hearing Loss  Hearing is good. Activities of Daily Living  The home contains: no safety equipment. Patient does total self care    Fall Risk  Fall Risk Assessment, last 12 mths 8/10/2017   Able to walk? Yes   Fall in past 12 months?  No       Abuse Screen  Patient is not abused    Cognitive Screening   Evaluation of Cognitive Function:  Has your family/caregiver stated any concerns about your memory: no  Normal    Patient Care Team   Patient Care Team:  Bo Ascencio MD as PCP - General (Family Practice)  Yodit Melvin MD (Family Practice)  Wang Thomas, RN as Ambulatory Care Navigator    Assessment/Plan   Education and counseling provided:  Are appropriate based on today's review and evaluation    Diagnoses and all orders for this visit:    1. Type 2 diabetes mellitus without complication, without long-term current use of insulin (HCC)  -     HEMOGLOBIN A1C WITH EAG  -     MICROALBUMIN, UR, RAND W/ MICROALB/CREAT RATIO  -     METABOLIC PANEL, COMPREHENSIVE  -     LIPID PANEL  -     FUNDUS PHOTOGRAPHY  -     Lancets misc; Test daily; one touch verio; dx: E11.9    2. Degenerative cervical disc  -     cyclobenzaprine (FLEXERIL) 10 mg tablet; Take 1 Tab by mouth three (3) times daily as needed for Muscle Spasm(s). D/c baclofen    3. Screening for viral disease  -     HEPATITIS C AB    4. Pure hypercholesterolemia  -     pravastatin (PRAVACHOL) 40 mg tablet; Take 1 Tab by mouth nightly. 5. Initial Medicare annual wellness visit    6. Essential hypertension, benign         Health Maintenance Due   Topic Date Due    Hepatitis C Screening  1965    FOOT EXAM Q1  09/15/1973    EYE EXAM RETINAL OR DILATED Q1  09/15/1973    Pneumococcal 19-64 Medium Risk (1 of 1 - PPSV23) 09/15/1982    DTaP/Tdap/Td series (1 - Tdap) 09/15/1984    FOBT Q 1 YEAR AGE 50-75  09/18/2015    MEDICARE YEARLY EXAM  03/28/2018    MICROALBUMIN Q1  05/05/2018   I have discussed the diagnosis with the patient and the intended plan as seen in the above orders. The patient has received an after-visit summary and questions were answered concerning future plans. Pt conveyed understanding of plan. Dr Gerald Ordoñez      Discussed the patient's BMI with him.   The BMI follow up plan is as follows:     dietary management education, guidance, and counseling  encourage exercise  monitor weight  prescribed dietary intake    An After Visit Summary was printed and given to the patient.

## 2018-05-25 NOTE — PROGRESS NOTES
Two patient Identification confirmed. Per Dr. Chang Drilling to patient in regards to labs. Pt verbalized understanding. Recommended call [facility] [phone].

## 2018-05-26 LAB
ALBUMIN SERPL-MCNC: 4.4 G/DL (ref 3.5–5.5)
ALBUMIN/CREAT UR: <14.3 MG/G CREAT (ref 0–30)
ALBUMIN/GLOB SERPL: 1.7 {RATIO} (ref 1.2–2.2)
ALP SERPL-CCNC: 107 IU/L (ref 39–117)
ALT SERPL-CCNC: 16 IU/L (ref 0–44)
AST SERPL-CCNC: 15 IU/L (ref 0–40)
BILIRUB SERPL-MCNC: 0.2 MG/DL (ref 0–1.2)
BUN SERPL-MCNC: 18 MG/DL (ref 6–24)
BUN/CREAT SERPL: 16 (ref 9–20)
CALCIUM SERPL-MCNC: 9.6 MG/DL (ref 8.7–10.2)
CHLORIDE SERPL-SCNC: 102 MMOL/L (ref 96–106)
CHOLEST SERPL-MCNC: 204 MG/DL (ref 100–199)
CO2 SERPL-SCNC: 20 MMOL/L (ref 18–29)
CREAT SERPL-MCNC: 1.14 MG/DL (ref 0.76–1.27)
CREAT UR-MCNC: 21 MG/DL
EST. AVERAGE GLUCOSE BLD GHB EST-MCNC: 194 MG/DL
GFR SERPLBLD CREATININE-BSD FMLA CKD-EPI: 73 ML/MIN/1.73
GFR SERPLBLD CREATININE-BSD FMLA CKD-EPI: 84 ML/MIN/1.73
GLOBULIN SER CALC-MCNC: 2.6 G/DL (ref 1.5–4.5)
GLUCOSE SERPL-MCNC: 159 MG/DL (ref 65–99)
HBA1C MFR BLD: 8.4 % (ref 4.8–5.6)
HCV AB S/CO SERPL IA: 0.1 S/CO RATIO (ref 0–0.9)
HDLC SERPL-MCNC: 24 MG/DL
INTERPRETATION, 910389: NORMAL
LDLC SERPL CALC-MCNC: ABNORMAL MG/DL (ref 0–99)
Lab: NORMAL
MICROALBUMIN UR-MCNC: <3 UG/ML
POTASSIUM SERPL-SCNC: 4.4 MMOL/L (ref 3.5–5.2)
PROT SERPL-MCNC: 7 G/DL (ref 6–8.5)
SODIUM SERPL-SCNC: 141 MMOL/L (ref 134–144)
TRIGL SERPL-MCNC: 457 MG/DL (ref 0–149)
VLDLC SERPL CALC-MCNC: ABNORMAL MG/DL (ref 5–40)

## 2018-05-29 DIAGNOSIS — E11.9 TYPE 2 DIABETES MELLITUS WITHOUT COMPLICATION, WITHOUT LONG-TERM CURRENT USE OF INSULIN (HCC): Primary | ICD-10-CM

## 2018-05-29 RX ORDER — METFORMIN HYDROCHLORIDE 500 MG/1
TABLET, EXTENDED RELEASE ORAL
Qty: 180 TAB | Refills: 0 | Status: SHIPPED | OUTPATIENT
Start: 2018-05-29 | End: 2018-12-04 | Stop reason: SDUPTHER

## 2018-05-29 NOTE — PROGRESS NOTES
Diabetes has worsened and pt needs add on medication. Would like to add on on januvia if pt agreeable. Should also have restarted pravastatin. If willing to start [place] I can send in.   Pt also needs to work on his diet and recheck labs in 3 months

## 2018-05-29 NOTE — PROGRESS NOTES
Patient id x [phone], notified and verbalized understanding. Agrees to Januvia. Has restarted Pravastatin.

## 2018-06-12 ENCOUNTER — TELEPHONE (OUTPATIENT)
Dept: FAMILY MEDICINE CLINIC | Age: 53
End: 2018-06-12

## 2018-06-12 NOTE — TELEPHONE ENCOUNTER
TP #15  Spoke w/ pt as part of Diabetes [facility] [place] outreach program.     Pt is doing better overall. He is testing BID-TID. bs avg 160. Encouraged pt to stay on current meds and start exercising. Pt has a treadmill at home and will start to do 20-30 minutes a day. Previously pt had no exercise. Pt feels diet is better. Discussed with patient goal of Diabetes to include: HgA1C <7, LDL cholesterol <100, Blood pressure <140/80. Discussed with patient diet and weight management and to get regular exercise. Recommend yearly eye exams and daily foot care. The patient understands and agrees with the plan.

## 2018-06-27 ENCOUNTER — TELEPHONE (OUTPATIENT)
Dept: FAMILY MEDICINE CLINIC | Age: 53
End: 2018-06-27

## 2018-06-28 DIAGNOSIS — E11.9 TYPE 2 DIABETES MELLITUS WITHOUT COMPLICATION, WITHOUT LONG-TERM CURRENT USE OF INSULIN (HCC): Primary | ICD-10-CM

## 2018-06-28 RX ORDER — GLIMEPIRIDE 2 MG/1
2 TABLET ORAL
Qty: 30 TAB | Refills: 2 | Status: SHIPPED | OUTPATIENT
Start: 2018-06-28 | End: 2018-08-14 | Stop reason: SDUPTHER

## 2018-07-24 ENCOUNTER — TELEPHONE (OUTPATIENT)
Dept: FAMILY MEDICINE CLINIC | Age: 53
End: 2018-07-24

## 2018-07-24 NOTE — TELEPHONE ENCOUNTER
TP #16  Spoke w/ pt as part of Diabetes Full [place] outreach program.   Nancy Murphy added on at last TP. Per pt, glimiperide--\"seems to be doing good\". ? Possible miscommunication. Pt supposed to add glimiperide on to metformin and Januvia however pt stopped taking Januvia. Per pt, his blood sugars are ranging from  on metformin and glipizide. He is sometimes taking 1 metformin and sometimes taking 2 metformin. Encouraged pt to take 2 metformin. Pt to test FBS and 1-2 hr pp blood sugar. Will call in 1 week to evaluate if Januvia needs to be added to metformin and glimiperide. Pt verbalizes understanding and agrees with plan.

## 2018-08-07 ENCOUNTER — TELEPHONE (OUTPATIENT)
Dept: FAMILY MEDICINE CLINIC | Age: 53
End: 2018-08-07

## 2018-08-07 NOTE — TELEPHONE ENCOUNTER
Pt returned phone call. Pt was admitted to [facility] on 8/2 for MI. Pt had cardiac cath and stent placement. Pt released on 8/4. Instructed pt to make f/u appointment to see Dr Alessandro Tai. He is set up to see Dr. Jocy Alfaro (Cards) on 8/22. Pt A1c is elevated-pt was put on insulin during hospital stay but d/c on regular home meds. Pt is compliant with daily aspirin therapy.

## 2018-08-18 DIAGNOSIS — E11.9 TYPE 2 DIABETES MELLITUS WITHOUT COMPLICATION, WITHOUT LONG-TERM CURRENT USE OF INSULIN (HCC): ICD-10-CM

## 2018-08-21 RX ORDER — METFORMIN HYDROCHLORIDE 500 MG/1
TABLET, EXTENDED RELEASE ORAL
Qty: 180 TAB | Refills: 0 | Status: SHIPPED | OUTPATIENT
Start: 2018-08-21 | End: 2018-08-23 | Stop reason: SDUPTHER

## 2018-08-23 ENCOUNTER — OFFICE VISIT (OUTPATIENT)
Dept: FAMILY MEDICINE CLINIC | Age: 53
End: 2018-08-23

## 2018-08-23 VITALS
HEART RATE: 75 BPM | OXYGEN SATURATION: 97 % | WEIGHT: 167.5 LBS | DIASTOLIC BLOOD PRESSURE: 81 MMHG | TEMPERATURE: 97.7 F | RESPIRATION RATE: 20 BRPM | HEIGHT: 69 IN | BODY MASS INDEX: 24.81 KG/M2 | SYSTOLIC BLOOD PRESSURE: 116 MMHG

## 2018-08-23 DIAGNOSIS — J43.9 PULMONARY EMPHYSEMA, UNSPECIFIED EMPHYSEMA TYPE (HCC): ICD-10-CM

## 2018-08-23 DIAGNOSIS — E78.00 PURE HYPERCHOLESTEROLEMIA: ICD-10-CM

## 2018-08-23 DIAGNOSIS — I10 ESSENTIAL HYPERTENSION, BENIGN: ICD-10-CM

## 2018-08-23 DIAGNOSIS — E11.9 TYPE 2 DIABETES MELLITUS WITHOUT COMPLICATION, WITHOUT LONG-TERM CURRENT USE OF INSULIN (HCC): Primary | ICD-10-CM

## 2018-08-23 DIAGNOSIS — I25.10 CORONARY ARTERY DISEASE DUE TO LIPID RICH PLAQUE: ICD-10-CM

## 2018-08-23 DIAGNOSIS — F17.200 SMOKER: ICD-10-CM

## 2018-08-23 DIAGNOSIS — I25.2 HISTORY OF MI (MYOCARDIAL INFARCTION): ICD-10-CM

## 2018-08-23 DIAGNOSIS — I25.83 CORONARY ARTERY DISEASE DUE TO LIPID RICH PLAQUE: ICD-10-CM

## 2018-08-23 RX ORDER — PERPHENAZINE 4 MG/1
4 TABLET, FILM COATED ORAL 2 TIMES DAILY
COMMUNITY

## 2018-08-23 RX ORDER — NITROGLYCERIN 0.4 MG/1
0.4 TABLET SUBLINGUAL
COMMUNITY
End: 2021-03-09 | Stop reason: SDUPTHER

## 2018-08-23 RX ORDER — ATORVASTATIN CALCIUM 40 MG/1
40 TABLET, FILM COATED ORAL
COMMUNITY
End: 2022-09-13 | Stop reason: ALTCHOICE

## 2018-08-23 RX ORDER — ASPIRIN 81 MG/1
TABLET ORAL DAILY
COMMUNITY
End: 2021-03-09 | Stop reason: ALTCHOICE

## 2018-08-23 RX ORDER — GEMFIBROZIL 600 MG/1
600 TABLET, FILM COATED ORAL 2 TIMES DAILY
COMMUNITY

## 2018-08-23 RX ORDER — IBUPROFEN 200 MG
1 TABLET ORAL EVERY 24 HOURS
Qty: 30 PATCH | Refills: 0 | Status: SHIPPED | OUTPATIENT
Start: 2018-08-23 | End: 2018-09-22

## 2018-08-23 RX ORDER — DULOXETIN HYDROCHLORIDE 30 MG/1
30 CAPSULE, DELAYED RELEASE ORAL DAILY
COMMUNITY
End: 2022-09-13 | Stop reason: ALTCHOICE

## 2018-08-23 NOTE — MR AVS SNAPSHOT
[address] [address] 
[phone] Patient: Fozia Strickland 
MRN: [id number] HIQ:[id number] Visit Information Date & Time Provider Department Dept. Phone Encounter #  
 8/23/2018 10:45 AM Ame Anderson MD [address] [facility] [phone] [id number] Follow-up Instructions Return in about 3 months (around 11/23/2018) for dm. Upcoming Health Maintenance Date Due  
 FOOT EXAM Q1 9/15/1973 Pneumococcal 19-64 Medium Risk (1 of 1 - PPSV23) 9/15/1982 DTaP/Tdap/Td series (1 - Tdap) 9/15/1984 FOBT Q 1 YEAR AGE 50-75 9/18/2015 Influenza Age 5 to Adult 8/1/2018 HEMOGLOBIN A1C Q6M 11/25/2018 MICROALBUMIN Q1 5/25/2019 LIPID PANEL Q1 5/25/2019 MEDICARE YEARLY EXAM 5/26/2019 EYE EXAM RETINAL OR DILATED Q1 6/15/2019 Allergies as of 8/23/2018  Review Complete On: 8/23/2018 By: Ame Anderson MD  
  
 Severity Noted Reaction Type Reactions Bee Sting [Sting, Bee] High 09/07/2016    Anaphylaxis Demerol [Meperidine] High 09/07/2016    Anaphylaxis Bee Sting [Sting, Bee]  09/02/2011    Hives Demerol [Meperidine]  08/13/2010    Palpitations Current Immunizations  Reviewed on 9/18/2014 Name Date Influenza Vaccine Split 10/27/2011 Not reviewed this visit You Were Diagnosed With   
  
 Codes Comments Type 2 diabetes mellitus without complication, without long-term current use of insulin (HCC)    -  Primary ICD-10-CM: E11.9 ICD-9-CM: 250.00 Essential hypertension, benign     ICD-10-CM: I10 
ICD-9-CM: 401.1 Pure hypercholesterolemia     ICD-10-CM: E78.00 ICD-9-CM: 272.0 Coronary artery disease due to lipid rich plaque     ICD-10-CM: I25.10, I25.83 ICD-9-CM: 414.00, 414.3 History of MI (myocardial infarction)     ICD-10-CM: I25.2 ICD-9-CM: 774 Pulmonary emphysema, unspecified emphysema type ([address]     ICD-10-CM: J43.9 ICD-9-CM: 492.8 Smoker     ICD-10-CM: G29.854 ICD-9-CM: 305.1 Vitals BP Pulse Temp Resp Height(growth percentile) Weight(growth percentile) 116/81 75 97.7 °F (36.5 °C) 20 5' 9\" (1.753 m) 167 lb 8 oz (76 kg) SpO2 BMI Smoking Status 97% 24.74 kg/m2 Current Every Day Smoker Vitals History BMI and BSA Data Body Mass Index Body Surface Area 24.74 kg/m 2 1.92 m 2 Preferred Pharmacy Pharmacy Name Phone [address] [facility], [address] [facility] [address] [facility] [phone] Your Updated Medication List  
  
   
This list is accurate as of 8/23/18 11:54 AM.  Always use your most recent med list.  
  
  
  
  
 aspirin delayed-release 81 mg tablet Take  by mouth daily. atorvastatin 40 mg tablet Commonly known as:  LIPITOR Take 40 mg by mouth nightly. BD Single Use Swabs Regular Padm Generic drug:  alcohol swabs USE TO TEST EVERY DAY * Blood-Glucose Meter monitoring kit Check BS once a day. * Blood-Glucose Meter monitoring kit One Touch Verio; test daily; dx: E11.9  
  
 cyclobenzaprine 10 mg tablet Commonly known as:  FLEXERIL Take 1 Tab by mouth three (3) times daily as needed for Muscle Spasm(s). D/c baclofen * CYMBALTA 30 mg capsule Generic drug:  DULoxetine Take 30 mg by mouth daily. * CYMBALTA 30 mg capsule Generic drug:  DULoxetine Take 30 mg by mouth daily. * DULoxetine 60 mg capsule Commonly known as:  CYMBALTA Take 1 Cap by mouth two (2) times a day. EPINEPHrine 0.3 mg/0.3 mL injection Commonly known as:  EPIPEN  
0.3 mL by IntraMUSCular route once as needed. furosemide 20 mg tablet Commonly known as:  LASIX Take 1 Tab by mouth daily. gemfibrozil 600 mg tablet Commonly known as:  LOPID Take 600 mg by mouth two (2) times a day. glimepiride 2 mg tablet Commonly known as:  AMARYL Take 1 Tab by mouth every morning. Indications: type 2 diabetes mellitus  
  
 glucose blood VI test strips strip Commonly known as:  ASCENSIA AUTODISC VI, ONE TOUCH ULTRA TEST VI Test daily; dx: E11.9; One Touch Verio  
  
 ibuprofen 800 mg tablet Commonly known as:  MOTRIN Take 1 Tab by mouth every eight (8) hours as needed for Pain. * Lancets Misc Check bs once a day. * Lancets Misc Test daily; one touch verio; dx: E11.9  
  
 metFORMIN  mg tablet Commonly known as:  GLUCOPHAGE XR  
TAKE 2 TABLETS BY MOUTH ONCE DAILY WITH DINNER  
  
 multivitamin, tx-iron-ca-min 9 mg iron-400 mcg Tab tablet Commonly known as:  THERA-M w/ IRON Take 1 Tab by mouth daily. nicotine 14 mg/24 hr patch Commonly known as:  NICODERM CQ  
1 Patch by TransDERmal route every twenty-four (24) hours for 30 days. nitroglycerin 0.4 mg SL tablet Commonly known as:  NITROSTAT  
0.4 mg by SubLINGual route every five (5) minutes as needed for Chest Pain. Up to 3 doses. OTHER Alcohol pads Dx: DM  
  
 perphenazine 4 mg tablet Commonly known as:  TRILAFON Take 4 mg by mouth two (2) times a day. Polyethylene Glycol 3350 Powd 1 Cap by Does Not Apply route daily. potassium chloride 20 mEq tablet Commonly known as:  K-DUR, KLOR-CON Take 1 Tab by mouth daily. * QUEtiapine 300 mg tablet Commonly known as:  SEROquel Take 600 mg by mouth nightly. * QUEtiapine 200 mg tablet Commonly known as:  SEROquel Take 200 mg by mouth every morning. ticagrelor 90 mg tablet Commonly known as:  [employer] Take  by mouth two (2) times a day. * Notice: This list has 9 medication(s) that are the same as other medications prescribed for you. Read the directions carefully, and ask your doctor or other care provider to review them with you. Prescriptions Sent to Pharmacy  Refills  
 nicotine (NICODERM CQ) 14 mg/24 hr patch 0  
 Si Patch by TransDERmal route every twenty-four (24) hours for 30 days. Class: Normal  
 Pharmacy: [facility] [address], [address] #: [phone] Route: TransDERmal  
  
We Performed the Following HEMOGLOBIN A1C WITH EAG [65279 CPT(R)] LIPID PANEL [53879 CPT(R)] METABOLIC PANEL, COMPREHENSIVE [13123 CPT(R)] Follow-up Instructions Return in about 3 months (around 2018) for dm. Introducing \[facility]\"" & HEALTH SERVICES! [employer] introduces EGT patient portal. Now you can access parts of your medical record, email your doctor's office, and request medication refills online. 1. In your internet browser, go to [email or web address] 2. Click on the First Time User? Click Here link in the Sign In box. You will see the New Member Sign Up page. 3. Enter your EGT Access Code exactly as it appears below. You will not need to use this code after youve completed the sign-up process. If you do not sign up before the expiration date, you must request a new code. · EGT Access Code: [email or web address] Expires: 2018 11:54 AM 
 
4. Enter the last four digits of your Social Security Number (xxxx) and Date of Birth (mm/dd/yyyy) as indicated and click Submit. You will be taken to the next sign-up page. 5. Create a EGT ID. This will be your EGT login ID and cannot be changed, so think of one that is secure and easy to remember. 6. Create a EGT password. You can change your password at any time. 7. Enter your Password Reset Question and Answer. This can be used at a later time if you forget your password. 8. Enter your e-mail address. You will receive e-mail notification when new information is available in [address] 9. Click Sign Up. You can now view and download portions of your medical record. 10. Click the Download Summary menu link to download a portable copy of your medical information. If you have questions, please visit the Frequently Asked Questions section of the [employer] website. Remember, [employer] is NOT to be used for urgent needs. For medical emergencies, dial 911. Now available from your iPhone and Android! Please provide this summary of care documentation to your next provider. Your primary care clinician is listed as CECILIA MERLOS. If you have any questions after today's visit, please call [phone].

## 2018-08-23 NOTE — PROGRESS NOTES
Patient here for hosp f/u, heart attack. Dm f/u, fasting labs. 1. Have you been to the ER, urgent care clinic since your last visit? Hospitalized since your last visit? No    2. Have you seen or consulted any other health care providers outside of the [address] [place] [address] since your last visit? Include any pap smears or colon screening. Suzi Wick  8/23/2018  Provider:   Hola:  Diabetes Report Card   1) Have you seen the eye doctor in past year?yes    2) How would you  rate your Diabetic Diet?good   3) How well do you take care of your feet?well   4) Do you keep your Primary Care Follow Up Appts? yes    5) Do you know your A1C goal?yes    6) Do you take your medications daily? yes    7) Do you check your blood sugars? yes    8) Have you gained weight?no       9) Do you follow an exercise program?no    10) Can you do better?yes      Lab Results   Component Value Date/Time    Cholesterol, total 204 (H) 05/25/2018 10:39 AM    HDL Cholesterol 24 (L) 05/25/2018 10:39 AM    LDL, calculated Comment 05/25/2018 10:39 AM    Triglyceride 457 (H) 05/25/2018 10:39 AM     Lab Results   Component Value Date/Time    Hemoglobin A1c 8.4 (H) 05/25/2018 10:39 AM    Hemoglobin A1c 7.5 (H) 02/05/2018 10:25 AM    Hemoglobin A1c 6.5 (H) 08/10/2017 12:05 PM    Glucose 159 (H) 05/25/2018 10:39 AM    Glucose (POC) 193 (H) 09/02/2011 06:15 PM    Microalb/Creat ratio (ug/mg creat.) <14.3 05/25/2018 10:39 AM    LDL, calculated Comment 05/25/2018 10:39 AM    Creatinine (POC) 0.7 01/04/2012 03:21 PM    Creatinine 1.14 05/25/2018 10:39 AM        Lab Results   Component Value Date/Time    Microalb/Creat ratio (ug/mg creat.) <14.3 05/25/2018 10:39 AM      Chief Complaint   Patient presents with   [facility] Follow Up     [facility]  heart attack  8/2/2018    Diabetes     fasting      he is a 45y.o. year old male who presents for evaluation. See Diabetic Report Card listed above.      Patient Active Problem List Diagnosis    Coronary artery disease due to lipid rich plaque    Pulmonary emphysema (HCC)    Type 2 diabetes mellitus without complication, without long-term current use of insulin (HCC)    Essential hypertension, benign    Right hand pain    Pain in both feet    Peripheral neuropathy    Idiopathic peripheral neuropathy    Chronic neck pain    Cervical spondylosis    Degenerative cervical disc    Hyperlipidemia    History of MI (myocardial infarction)    RSD (reflex sympathetic dystrophy)    Paranoia (psychosis) ([facility] [address]    Delusions of persecution    Schizoaffective disorder ([facility] [address]       Reviewed PmHx, RxHx, FmHx, SocHx, AllgHx--dated and updated in the chart. Review of Systems - negative except as listed above in the HPI    Objective:     Vitals:    08/23/18 1135   BP: 116/81   Pulse: 75   Resp: 20   Temp: 97.7 °F (36.5 °C)   SpO2: 97%   Weight: 167 lb 8 oz (76 kg)   Height: 5' 9\" (1.753 m)     Physical Examination: General appearance - alert, well appearing, and in no distress  Neck - supple, no significant adenopathy  Chest - clear to auscultation, no wheezes, rales or rhonchi, symmetric air entry  Heart - normal rate, regular rhythm, normal S1, S2, no murmurs, rubs, clicks or gallops  Abdomen - soft, nontender, nondistended, no masses or organomegaly    Assessment/ Plan:   Diagnoses and all orders for this visit:    1. Type 2 diabetes mellitus without complication, without long-term current use of insulin (HCC)  -     LIPID PANEL  -     METABOLIC PANEL, COMPREHENSIVE  -     HEMOGLOBIN A1C WITH EAG   -not at goal  --mental illness is an issue here  -Patient is enrolled in our [facility] [place] Diabetes plan. Patient has agreed to participate and understands expectations and goals  Our brochure, along with the listed current labs and standards of care, was given to the patient.   The patient has met with [employer] (JEN) and will be contacting the patient outside of their appointments to further improved their care. 2. Essential hypertension, benign  -     LIPID PANEL  -     METABOLIC PANEL, COMPREHENSIVE    3. Pure hypercholesterolemia  -     LIPID PANEL  -     METABOLIC PANEL, COMPREHENSIVE    4. Coronary artery disease due to lipid rich plaque  -     LIPID PANEL  -     METABOLIC PANEL, COMPREHENSIVE    5. History of MI (myocardial infarction)  -s/p stents    6. Pulmonary emphysema, unspecified emphysema type ([facility] [address]  -     nicotine (NICODERM CQ) 14 mg/24 hr patch; 1 Patch by TransDERmal route every twenty-four (24) hours for 30 days. -pt has to stop smoking    7. Smoker  -     nicotine (NICODERM CQ) 14 mg/24 hr patch; 1 Patch by TransDERmal route every twenty-four (24) hours for 30 days. Follow-up Disposition:  Return in about 3 months (around 11/23/2018) for dm. Lab Results   Component Value Date/Time    Cholesterol, total 204 (H) 05/25/2018 10:39 AM    HDL Cholesterol 24 (L) 05/25/2018 10:39 AM    LDL, calculated Comment 05/25/2018 10:39 AM    Triglyceride 457 (H) 05/25/2018 10:39 AM     Lab Results   Component Value Date/Time    Hemoglobin A1c 8.4 (H) 05/25/2018 10:39 AM    Hemoglobin A1c 7.5 (H) 02/05/2018 10:25 AM    Hemoglobin A1c 6.5 (H) 08/10/2017 12:05 PM    Microalb/Creat ratio (ug/mg creat.) <14.3 05/25/2018 10:39 AM    LDL, calculated Comment 05/25/2018 10:39 AM    Creatinine (POC) 0.7 01/04/2012 03:21 PM    Creatinine 1.14 05/25/2018 10:39 AM          Discussed with patient goal of Diabetes to include:  HgA1C <7, LDL cholesterol <70, Blood pressure <130/80. Discussed with patient diet and weight management and to get regular exercise. Recommend yearly eye exams and daily foot care. The patient understands and agrees with the plan. I have discussed the diagnosis with the patient and the intended plan as seen in the above orders. The patient has received an after-visit summary and questions were answered concerning future plans.      Medication Side Effects and Warnings were discussed with patient  Patient Labs were reviewed and or requested  Patient Past Records were reviewed and or requested    Zohreh Frias M.D. [address] [facility]    There are no Patient Instructions on file for this visit.

## 2018-08-24 LAB
ALBUMIN SERPL-MCNC: 4.9 G/DL (ref 3.5–5.5)
ALBUMIN/GLOB SERPL: 1.9 {RATIO} (ref 1.2–2.2)
ALP SERPL-CCNC: 103 IU/L (ref 39–117)
ALT SERPL-CCNC: 18 IU/L (ref 0–44)
AST SERPL-CCNC: 17 IU/L (ref 0–40)
BILIRUB SERPL-MCNC: <0.2 MG/DL (ref 0–1.2)
BUN SERPL-MCNC: 25 MG/DL (ref 6–24)
BUN/CREAT SERPL: 22 (ref 9–20)
CALCIUM SERPL-MCNC: 10.6 MG/DL (ref 8.7–10.2)
CHLORIDE SERPL-SCNC: 105 MMOL/L (ref 96–106)
CHOLEST SERPL-MCNC: 118 MG/DL (ref 100–199)
CO2 SERPL-SCNC: 21 MMOL/L (ref 20–29)
CREAT SERPL-MCNC: 1.12 MG/DL (ref 0.76–1.27)
EST. AVERAGE GLUCOSE BLD GHB EST-MCNC: 157 MG/DL
GLOBULIN SER CALC-MCNC: 2.6 G/DL (ref 1.5–4.5)
GLUCOSE SERPL-MCNC: 131 MG/DL (ref 65–99)
HBA1C MFR BLD: 7.1 % (ref 4.8–5.6)
HDLC SERPL-MCNC: 32 MG/DL
INTERPRETATION, 910389: NORMAL
LDLC SERPL CALC-MCNC: 54 MG/DL (ref 0–99)
Lab: NORMAL
POTASSIUM SERPL-SCNC: 5.9 MMOL/L (ref 3.5–5.2)
PROT SERPL-MCNC: 7.5 G/DL (ref 6–8.5)
SODIUM SERPL-SCNC: 144 MMOL/L (ref 134–144)
TRIGL SERPL-MCNC: 159 MG/DL (ref 0–149)
VLDLC SERPL CALC-MCNC: 32 MG/DL (ref 5–40)

## 2018-08-27 ENCOUNTER — TELEPHONE (OUTPATIENT)
Dept: FAMILY MEDICINE CLINIC | Age: 53
End: 2018-08-27

## 2018-08-27 NOTE — PROGRESS NOTES
A1C looks much better, the potassium and Calcium are elevated--suspect lab error. Stop any K supplements.     Cecile Christopher

## 2018-08-27 NOTE — PROGRESS NOTES
Patient called and verified. Reviewed labs and rec per Dr. Nancy Cadena to stop K suppliments. Patient states he is on potassium pills due to taking a diuretic everyday. Informed potassium levels were elevated and he needed to hold potassium pills. He asked for how long? And asked if he needed to have more labs drawn in a week or two to see if potassium levels came down. Message sent to Dr. Nancy Cadena.

## 2018-09-12 DIAGNOSIS — E87.5 HYPERKALEMIA: Primary | ICD-10-CM

## 2018-09-19 LAB — POTASSIUM SERPL-SCNC: 4.1 MMOL/L (ref 3.5–5.2)

## 2018-09-19 NOTE — PROGRESS NOTES
Called and spoke to patient. Regan Wan Pt states understanding per Dr Saira James: Stay on K since he is on lasix.

## 2018-09-19 NOTE — PROGRESS NOTES
Called and spoke to patient. (Kory) Pt states understanding of of lab result per Gunnar: Potassium normal. Continue with current plan of care. Patient states, plan unclear due to he was told to stop potassium after last lab. (8/24/18) Should he resume taking Klorcon 20 meq? He states he is still taking Lasix 20 mg.

## 2018-09-24 DIAGNOSIS — I10 ESSENTIAL HYPERTENSION, BENIGN: ICD-10-CM

## 2018-09-25 RX ORDER — FUROSEMIDE 20 MG/1
TABLET ORAL
Qty: 90 TAB | Refills: 1 | Status: SHIPPED | OUTPATIENT
Start: 2018-09-25 | End: 2018-12-04 | Stop reason: SDUPTHER

## 2018-10-17 ENCOUNTER — TELEPHONE (OUTPATIENT)
Dept: FAMILY MEDICINE CLINIC | Age: 53
End: 2018-10-17

## 2018-10-17 NOTE — TELEPHONE ENCOUNTER
TP #21  Spoke w/ pt as part of Diabetes [facility] [place] outreach program.     Pt reports managing his diabetes well. He does not follow a routine exercise program. He does walk occasionally but states he has been mainly focusing on his diet to control his diabetes. He is compliant with medications. Together we reviewed diabetic diet. Discussed with patient goal of Diabetes to include: HgA1C <7, LDL cholesterol <100, Blood pressure <140/80. Discussed with patient diet and weight management and to get regular exercise. Recommend yearly eye exams and daily foot care. The patient understands and agrees with the plan.

## 2018-10-22 DIAGNOSIS — E11.9 TYPE 2 DIABETES MELLITUS WITHOUT COMPLICATION, WITHOUT LONG-TERM CURRENT USE OF INSULIN (HCC): ICD-10-CM

## 2018-11-12 RX ORDER — POTASSIUM CHLORIDE 1500 MG/1
TABLET, FILM COATED, EXTENDED RELEASE ORAL
Qty: 90 TAB | Refills: 1 | Status: SHIPPED | OUTPATIENT
Start: 2018-11-12 | End: 2018-12-04 | Stop reason: SDUPTHER

## 2018-11-16 DIAGNOSIS — E11.9 TYPE 2 DIABETES MELLITUS WITHOUT COMPLICATION, WITHOUT LONG-TERM CURRENT USE OF INSULIN (HCC): ICD-10-CM

## 2018-11-16 RX ORDER — GLIMEPIRIDE 2 MG/1
TABLET ORAL
Qty: 90 TAB | Refills: 0 | Status: SHIPPED | OUTPATIENT
Start: 2018-11-16 | End: 2018-12-04 | Stop reason: SDUPTHER

## 2018-12-04 ENCOUNTER — OFFICE VISIT (OUTPATIENT)
Dept: FAMILY MEDICINE CLINIC | Age: 53
End: 2018-12-04

## 2018-12-04 VITALS
SYSTOLIC BLOOD PRESSURE: 101 MMHG | WEIGHT: 167 LBS | DIASTOLIC BLOOD PRESSURE: 68 MMHG | RESPIRATION RATE: 22 BRPM | HEIGHT: 69 IN | TEMPERATURE: 97.7 F | OXYGEN SATURATION: 94 % | HEART RATE: 77 BPM | BODY MASS INDEX: 24.73 KG/M2

## 2018-12-04 DIAGNOSIS — Z23 ENCOUNTER FOR IMMUNIZATION: ICD-10-CM

## 2018-12-04 DIAGNOSIS — M50.30 DEGENERATIVE CERVICAL DISC: ICD-10-CM

## 2018-12-04 DIAGNOSIS — E11.9 TYPE 2 DIABETES MELLITUS WITHOUT COMPLICATION, WITHOUT LONG-TERM CURRENT USE OF INSULIN (HCC): Primary | ICD-10-CM

## 2018-12-04 DIAGNOSIS — J43.9 PULMONARY EMPHYSEMA, UNSPECIFIED EMPHYSEMA TYPE (HCC): ICD-10-CM

## 2018-12-04 DIAGNOSIS — E78.00 PURE HYPERCHOLESTEROLEMIA: ICD-10-CM

## 2018-12-04 DIAGNOSIS — I10 ESSENTIAL HYPERTENSION, BENIGN: ICD-10-CM

## 2018-12-04 RX ORDER — METFORMIN HYDROCHLORIDE 500 MG/1
TABLET, EXTENDED RELEASE ORAL
Qty: 180 TAB | Refills: 1 | Status: SHIPPED | OUTPATIENT
Start: 2018-12-04 | End: 2019-03-27 | Stop reason: SDUPTHER

## 2018-12-04 RX ORDER — FUROSEMIDE 20 MG/1
TABLET ORAL
Qty: 90 TAB | Refills: 1 | Status: SHIPPED | OUTPATIENT
Start: 2018-12-04 | End: 2019-03-27 | Stop reason: SDUPTHER

## 2018-12-04 RX ORDER — CYCLOBENZAPRINE HCL 10 MG
10 TABLET ORAL
Qty: 270 TAB | Refills: 3 | Status: SHIPPED | OUTPATIENT
Start: 2018-12-04 | End: 2019-03-27 | Stop reason: SDUPTHER

## 2018-12-04 RX ORDER — POTASSIUM CHLORIDE 1500 MG/1
TABLET, FILM COATED, EXTENDED RELEASE ORAL
Qty: 90 TAB | Refills: 1 | Status: SHIPPED | OUTPATIENT
Start: 2018-12-04 | End: 2019-03-27 | Stop reason: SDUPTHER

## 2018-12-04 RX ORDER — GLIMEPIRIDE 2 MG/1
TABLET ORAL
Qty: 90 TAB | Refills: 1 | Status: SHIPPED | OUTPATIENT
Start: 2018-12-04 | End: 2019-03-27 | Stop reason: SDUPTHER

## 2018-12-04 RX ORDER — IBUPROFEN 800 MG/1
800 TABLET ORAL
Qty: 300 TAB | Refills: 3 | Status: SHIPPED | OUTPATIENT
Start: 2018-12-04 | End: 2019-09-10 | Stop reason: SDUPTHER

## 2018-12-04 RX ORDER — POTASSIUM CHLORIDE 20 MEQ/1
20 TABLET, EXTENDED RELEASE ORAL DAILY
Qty: 90 TAB | Refills: 1 | Status: SHIPPED | OUTPATIENT
Start: 2018-12-04 | End: 2019-03-27 | Stop reason: SDUPTHER

## 2018-12-04 NOTE — PROGRESS NOTES
Patient here for dm f/u fasting labs. 1. Have you been to the ER, urgent care clinic since your last visit? Hospitalized since your last visit? No    2. Have you seen or consulted any other health care providers outside of the [address] [place] [address] since your last visit? Include any pap smears or colon screening. No       Patricia Fabian  12/4/2018  Provider:   Hola:  Diabetes Report Card   1) Have you seen the eye doctor in past year?yes    2) How would you  rate your Diabetic Diet?good   3) How well do you take care of your feet?well   4) Do you keep your Primary Care Follow Up Appts? yes    5) Do you know your A1C goal?yes    6) Do you take your medications daily? yes    7) Do you check your blood sugars? yes    8) Have you gained weight?no       9) Do you follow an exercise program?no   10) Can you do better?yes      Lab Results   Component Value Date/Time    Cholesterol, total 118 08/23/2018 11:56 AM    HDL Cholesterol 32 (L) 08/23/2018 11:56 AM    LDL, calculated 54 08/23/2018 11:56 AM    Triglyceride 159 (H) 08/23/2018 11:56 AM     Lab Results   Component Value Date/Time    Hemoglobin A1c 7.1 (H) 08/23/2018 11:56 AM    Hemoglobin A1c 8.4 (H) 05/25/2018 10:39 AM    Hemoglobin A1c 7.5 (H) 02/05/2018 10:25 AM    Glucose 131 (H) 08/23/2018 11:56 AM    Glucose (POC) 193 (H) 09/02/2011 06:15 PM    Microalb/Creat ratio (ug/mg creat.) <14.3 05/25/2018 10:39 AM    LDL, calculated 54 08/23/2018 11:56 AM    Creatinine (POC) 0.7 01/04/2012 03:21 PM    Creatinine 1.12 08/23/2018 11:56 AM        Lab Results   Component Value Date/Time    Microalb/Creat ratio (ug/mg creat.) <14.3 05/25/2018 10:39 AM      Chief Complaint   Patient presents with    Diabetes     fasting labs    Medication Refill    Immunization/Injection     flu shot, pneumonia vaccine     he is a 52y.o. year old male who presents for evaluation. See Diabetic Report Card listed above.      Patient Active Problem List    Diagnosis    Coronary artery disease due to lipid rich plaque    Pulmonary emphysema (HCC)    Type 2 diabetes mellitus without complication, without long-term current use of insulin ([facility])    Essential hypertension, benign    Right hand pain    Pain in both feet    Peripheral neuropathy    Idiopathic peripheral neuropathy    Chronic neck pain    Cervical spondylosis    Degenerative cervical disc    Hyperlipidemia    History of MI (myocardial infarction)    RSD (reflex sympathetic dystrophy)    [facility]anoia (psychosis) ([facility] [address]    Delusions of persecution    [facility]       Reviewed PmHx, RxHx, FmHx, SocHx, AllgHx--dated and updated in the chart. Review of Systems - negative except as listed above in the HPI    Objective:     Vitals:    12/04/18 0942   BP: 101/68   Pulse: 77   Resp: 22   Temp: 97.7 °F (36.5 °C)   SpO2: 94%   Weight: 167 lb (75.8 kg)   Height: 5' 9\" (1.753 m)     Physical Examination: General appearance - alert, well appearing, and in no distress  Neck - supple, no significant adenopathy  Chest - clear to auscultation, no wheezes, rales or rhonchi, symmetric air entry  Heart - normal rate, regular rhythm, normal S1, S2, no murmurs, rubs, clicks or gallops  Abdomen - soft, nontender, nondistended, no masses or organomegaly  Extremities - peripheral pulses normal, no pedal edema, no clubbing or cyanosis    Assessment/ Plan:   Diagnoses and all orders for this visit:    1. Type 2 diabetes mellitus without complication, without long-term current use of insulin ([facility])  -     glimepiride (AMARYL) 2 mg tablet; TAKE 1 TABLET BY MOUTH ONCE DAILY IN THE MORNING FOR TYPE 2 DIABETES MELLITUS  -     metFORMIN ER (GLUCOPHAGE XR) 500 mg tablet; TAKE 2 TABLETS BY MOUTH ONCE DAILY WITH DINNER  -     LIPID PANEL  -     METABOLIC PANEL, COMPREHENSIVE  -     HEMOGLOBIN A1C WITH EAG  Lab Results   Component Value Date/Time    Hemoglobin A1c 7.1 (H) 08/23/2018 11:56 AM     -at goal    2.  Essential hypertension, benign  -     furosemide (LASIX) 20 mg tablet; TAKE 1 TABLET BY MOUTH ONCE DAILY  -     potassium chloride SR (K-TAB) 20 mEq tablet; TAKE 1 TABLET BY MOUTH ONCE DAILY  -     potassium chloride (K-DUR, KLOR-CON) 20 mEq tablet; Take 1 Tab by mouth daily.  -     LIPID PANEL  -     METABOLIC PANEL, COMPREHENSIVE  -at goal    3. Degenerative cervical disc  -     cyclobenzaprine (FLEXERIL) 10 mg tablet; Take 1 Tab by mouth three (3) times daily as needed for Muscle Spasm(s). D/c baclofen  -     ibuprofen (MOTRIN) 800 mg tablet; Take 1 Tab by mouth every eight (8) hours as needed for Pain. 4. Encounter for immunization  -     INFLUENZA VIRUS VAC QUAD,SPLIT,PRESV FREE SYRINGE IM  -     ADMIN INFLUENZA VIRUS VAC  -     PNEUMOCOCCAL POLYSACCHARIDE VACCINE, 23-VALENT, ADULT OR IMMUNOSUPPRESSED PT DOSE,  -     ADMIN PNEUMOCOCCAL VACCINE    5. Pure hypercholesterolemia  -     LIPID PANEL  -     METABOLIC PANEL, COMPREHENSIVE    6. Pulmonary emphysema, unspecified emphysema type ([facility]  -dwp to dc smoking       Follow-up Disposition:  Return in about 3 months (around 3/4/2019) for dm. Lab Results   Component Value Date/Time    Cholesterol, total 118 08/23/2018 11:56 AM    HDL Cholesterol 32 (L) 08/23/2018 11:56 AM    LDL, calculated 54 08/23/2018 11:56 AM    Triglyceride 159 (H) 08/23/2018 11:56 AM     Lab Results   Component Value Date/Time    Hemoglobin A1c 7.1 (H) 08/23/2018 11:56 AM    Hemoglobin A1c 8.4 (H) 05/25/2018 10:39 AM    Hemoglobin A1c 7.5 (H) 02/05/2018 10:25 AM    Microalb/Creat ratio (ug/mg creat.) <14.3 05/25/2018 10:39 AM    LDL, calculated 54 08/23/2018 11:56 AM    Creatinine (POC) 0.7 01/04/2012 03:21 PM    Creatinine 1.12 08/23/2018 11:56 AM          Discussed with patient goal of Diabetes to include:  HgA1C <7, LDL cholesterol <100, Blood pressure <140/80. Discussed with patient diet and weight management and to get regular exercise. Recommend yearly eye exams and daily foot care.   The patient understands and agrees with the plan. I have discussed the diagnosis with the patient and the intended plan as seen in the above orders. The patient has received an after-visit summary and questions were answered concerning future plans. Medication Side Effects and Warnings were discussed with patient  Patient Labs were reviewed and or requested  Patient Past Records were reviewed and or requested    Kb Hunter M.D. [facility]    There are no Patient Instructions on file for this visit.

## 2018-12-05 LAB
ALBUMIN SERPL-MCNC: 4.9 G/DL (ref 3.5–5.5)
ALBUMIN/GLOB SERPL: 2.2 {RATIO} (ref 1.2–2.2)
ALP SERPL-CCNC: 115 IU/L (ref 39–117)
ALT SERPL-CCNC: 18 IU/L (ref 0–44)
AST SERPL-CCNC: 14 IU/L (ref 0–40)
BILIRUB SERPL-MCNC: <0.2 MG/DL (ref 0–1.2)
BUN SERPL-MCNC: 21 MG/DL (ref 6–24)
BUN/CREAT SERPL: 17 (ref 9–20)
CALCIUM SERPL-MCNC: 9.6 MG/DL (ref 8.7–10.2)
CHLORIDE SERPL-SCNC: 104 MMOL/L (ref 96–106)
CHOLEST SERPL-MCNC: 134 MG/DL (ref 100–199)
CO2 SERPL-SCNC: 22 MMOL/L (ref 20–29)
CREAT SERPL-MCNC: 1.24 MG/DL (ref 0.76–1.27)
EST. AVERAGE GLUCOSE BLD GHB EST-MCNC: 148 MG/DL
GLOBULIN SER CALC-MCNC: 2.2 G/DL (ref 1.5–4.5)
GLUCOSE SERPL-MCNC: 151 MG/DL (ref 65–99)
HBA1C MFR BLD: 6.8 % (ref 4.8–5.6)
HDLC SERPL-MCNC: 28 MG/DL
INTERPRETATION, 910389: NORMAL
LDLC SERPL CALC-MCNC: 75 MG/DL (ref 0–99)
Lab: NORMAL
POTASSIUM SERPL-SCNC: 5.1 MMOL/L (ref 3.5–5.2)
PROT SERPL-MCNC: 7.1 G/DL (ref 6–8.5)
SODIUM SERPL-SCNC: 142 MMOL/L (ref 134–144)
TRIGL SERPL-MCNC: 157 MG/DL (ref 0–149)
VLDLC SERPL CALC-MCNC: 31 MG/DL (ref 5–40)

## 2018-12-05 NOTE — PROGRESS NOTES
Spoke to pt to review lab results. 1. Cholesterol is stable. Reviewed diet changes. 2. Blood glucose is elevated from diabetes. 3. Hemoglobin A1c under 7%. Pt understands goal is <7%; continue medications and lifestyle changes. Discussed with patient goal of Diabetes to include: HgA1C <7, LDL cholesterol <100, Blood pressure <140/80. Discussed with patient diet and weight management and to get regular exercise. Recommend yearly eye exams and daily foot care. The patient understands and agrees with the plan. Repeat labs in 3 months.

## 2019-01-05 NOTE — PROGRESS NOTES
Spoke to pt to review lab results. Pt was out of medication for a few days but is back to taking all meds. A1c above goal of less than 7. Reviewed lifestyle changes. Pt states he ate poorly over the past 3 months. He would like to work on diet and exercise before increasing metformin. Pt will need to return to office in 3 months for A1c and cholesterol check. Metabolic panel shows glucose elevated from diabetes. All other labs wnl. Implemented All Fall Risk Interventions:  [place] to call system. Call bell, personal items and telephone within reach. Instruct patient to call for assistance. Room bathroom lighting operational. Non-slip footwear when patient is off stretcher. Physically safe environment: no spills, clutter or unnecessary equipment. Stretcher in lowest position, wheels locked, appropriate side rails in place. Provide visual cue, wrist band, yellow gown, etc. Monitor gait and stability. Monitor for mental status changes and reorient to person, place, and time. Review medications for side effects contributing to fall risk. Reinforce activity limits and safety measures with patient and family.

## 2019-01-22 DIAGNOSIS — E11.9 TYPE 2 DIABETES MELLITUS WITHOUT COMPLICATION, WITHOUT LONG-TERM CURRENT USE OF INSULIN (HCC): ICD-10-CM

## 2019-01-22 RX ORDER — LANCETS
EACH MISCELLANEOUS
Qty: 1 EACH | Refills: 11 | Status: SHIPPED | OUTPATIENT
Start: 2019-01-22 | End: 2019-09-04 | Stop reason: SDUPTHER

## 2019-02-13 ENCOUNTER — TELEPHONE (OUTPATIENT)
Dept: FAMILY MEDICINE CLINIC | Age: 54
End: 2019-02-13

## 2019-02-13 NOTE — TELEPHONE ENCOUNTER
Spoke w/ pt as part of Diabetes [facility] [place] outreach program.   Pt is doing well. He is participating 3 times a week in Cardiac Rehab. Pt stops cardiac rehab in April. Blood sugars are monitored before and after rehab sessions. Per pt, blood sugars are at goal.   No changes to diet. Discussed with patient goal of Diabetes to include: HgA1C <7, LDL cholesterol <100, Blood pressure <140/80. Discussed with patient diet and weight management and to get regular exercise. Recommend yearly eye exams and daily foot care. The patient understands and agrees with the plan.

## 2019-03-27 ENCOUNTER — OFFICE VISIT (OUTPATIENT)
Dept: FAMILY MEDICINE CLINIC | Age: 54
End: 2019-03-27

## 2019-03-27 ENCOUNTER — TELEPHONE (OUTPATIENT)
Dept: FAMILY MEDICINE CLINIC | Age: 54
End: 2019-03-27

## 2019-03-27 VITALS
SYSTOLIC BLOOD PRESSURE: 100 MMHG | OXYGEN SATURATION: 97 % | TEMPERATURE: 97.4 F | HEART RATE: 77 BPM | HEIGHT: 69 IN | BODY MASS INDEX: 25.12 KG/M2 | WEIGHT: 169.6 LBS | DIASTOLIC BLOOD PRESSURE: 62 MMHG | RESPIRATION RATE: 17 BRPM

## 2019-03-27 DIAGNOSIS — M50.30 DEGENERATIVE CERVICAL DISC: ICD-10-CM

## 2019-03-27 DIAGNOSIS — G60.9 IDIOPATHIC PERIPHERAL NEUROPATHY: ICD-10-CM

## 2019-03-27 DIAGNOSIS — E78.2 MIXED HYPERLIPIDEMIA: ICD-10-CM

## 2019-03-27 DIAGNOSIS — J06.9 UPPER RESPIRATORY TRACT INFECTION, UNSPECIFIED TYPE: ICD-10-CM

## 2019-03-27 DIAGNOSIS — E11.9 TYPE 2 DIABETES MELLITUS WITHOUT COMPLICATION, WITHOUT LONG-TERM CURRENT USE OF INSULIN (HCC): Primary | ICD-10-CM

## 2019-03-27 DIAGNOSIS — I10 ESSENTIAL HYPERTENSION, BENIGN: ICD-10-CM

## 2019-03-27 RX ORDER — FUROSEMIDE 20 MG/1
TABLET ORAL
Qty: 90 TAB | Refills: 1 | Status: SHIPPED | OUTPATIENT
Start: 2019-03-27 | End: 2019-09-04 | Stop reason: SDUPTHER

## 2019-03-27 RX ORDER — CYCLOBENZAPRINE HCL 10 MG
10 TABLET ORAL
Qty: 270 TAB | Refills: 3 | Status: SHIPPED | OUTPATIENT
Start: 2019-03-27 | End: 2019-09-04 | Stop reason: SDUPTHER

## 2019-03-27 RX ORDER — AZITHROMYCIN 250 MG/1
TABLET, FILM COATED ORAL
Qty: 6 TAB | Refills: 0 | Status: SHIPPED | OUTPATIENT
Start: 2019-03-27 | End: 2019-04-02 | Stop reason: ALTCHOICE

## 2019-03-27 RX ORDER — METFORMIN HYDROCHLORIDE 500 MG/1
TABLET, EXTENDED RELEASE ORAL
Qty: 180 TAB | Refills: 1 | Status: SHIPPED | OUTPATIENT
Start: 2019-03-27 | End: 2019-09-04 | Stop reason: SDUPTHER

## 2019-03-27 RX ORDER — GEMFIBROZIL 600 MG/1
600 TABLET, FILM COATED ORAL 2 TIMES DAILY
Status: CANCELLED | OUTPATIENT
Start: 2019-03-27

## 2019-03-27 RX ORDER — DULOXETIN HYDROCHLORIDE 30 MG/1
30 CAPSULE, DELAYED RELEASE ORAL DAILY
Status: CANCELLED | OUTPATIENT
Start: 2019-03-27

## 2019-03-27 RX ORDER — HYDROXYZINE HYDROCHLORIDE 10 MG/1
10 TABLET, FILM COATED ORAL
COMMUNITY

## 2019-03-27 RX ORDER — GLIMEPIRIDE 2 MG/1
TABLET ORAL
Qty: 90 TAB | Refills: 1 | Status: SHIPPED | OUTPATIENT
Start: 2019-03-27 | End: 2019-09-04 | Stop reason: SDUPTHER

## 2019-03-27 RX ORDER — POTASSIUM CHLORIDE 20 MEQ/1
20 TABLET, EXTENDED RELEASE ORAL DAILY
Qty: 90 TAB | Refills: 1 | Status: SHIPPED | OUTPATIENT
Start: 2019-03-27 | End: 2019-09-04 | Stop reason: SDUPTHER

## 2019-03-27 RX ORDER — POTASSIUM CHLORIDE 1500 MG/1
TABLET, FILM COATED, EXTENDED RELEASE ORAL
Qty: 90 TAB | Refills: 1 | Status: SHIPPED | OUTPATIENT
Start: 2019-03-27 | End: 2019-09-04 | Stop reason: SDUPTHER

## 2019-03-27 RX ORDER — DULOXETIN HYDROCHLORIDE 60 MG/1
60 CAPSULE, DELAYED RELEASE ORAL 2 TIMES DAILY
Qty: 60 CAP | Refills: 11 | Status: CANCELLED | OUTPATIENT
Start: 2019-03-27

## 2019-03-27 NOTE — TELEPHONE ENCOUNTER
[address] and confirmed per Dr Selene Martinez aware and okay to dispense medication as written.

## 2019-03-27 NOTE — TELEPHONE ENCOUNTER
Pharmacy called in stating that they need to know if it is okay to dispense the azithromycin and it interacts with the Seroquel.

## 2019-03-27 NOTE — PROGRESS NOTES
Chief Complaint   Patient presents with    Cold Symptoms     X 2 days: cough    Sore Throat    Labs     Fasting today:A1c    Medication Refill    Diabetes     1. Have you been to the ER, urgent care clinic since your last visit? Hospitalized since your last visit? No    2. Have you seen or consulted any other health care providers outside of the [address] [place] [address] since your last visit? Include any pap smears or colon screening. No      Lab Results   Component Value Date/Time    Microalb/Creat ratio (ug/mg creat.) <14.3 05/25/2018 10:39 AM      Chief Complaint   Patient presents with    Cold Symptoms     X 2 days: cough    Sore Throat    Labs     Fasting today:A1c    Medication Refill    Diabetes     he is a 52y.o. year old male who presents for evaluation. See Diabetic Report Card listed above. Patient Active Problem List    Diagnosis    Coronary artery disease due to lipid rich plaque    Pulmonary emphysema (HCC)    Type 2 diabetes mellitus without complication, without long-term current use of insulin (HCC)    Essential hypertension, benign    Right hand pain    Pain in both feet    Peripheral neuropathy    Idiopathic peripheral neuropathy    Chronic neck pain    Cervical spondylosis    Degenerative cervical disc    Hyperlipidemia    History of MI (myocardial infarction)    RSD (reflex sympathetic dystrophy)    [facility]osis) [facility]    Delusions of persecution    Schizoaffective disorder ([facility]       Reviewed PmHx, RxHx, FmHx, SocHx, AllgHx--dated and updated in the chart.     Review of Systems - negative except as listed above in the HPI    Objective:     Vitals:    03/27/19 1151   BP: 100/62   Pulse: 77   Resp: 17   Temp: 97.4 °F (36.3 °C)   TempSrc: Oral   SpO2: 97%   Weight: 169 lb 9.6 oz (76.9 kg)   Height: 5' 9\" (1.753 m)     Physical Examination: General appearance - alert, well appearing, and in no distress  Ears - bilateral TM's and external ear canals normal  Nose - normal and patent, no erythema, discharge or polyps  Mouth - mucous membranes moist, pharynx normal without lesions  Neck - supple, no significant adenopathy  Chest - clear to auscultation, no wheezes, rales or rhonchi, symmetric air entry  Heart - normal rate, regular rhythm, normal S1, S2, no murmurs, rubs, clicks or gallops  Abdomen - soft, nontender, nondistended, no masses or organomegaly  Extremities - peripheral pulses normal, no pedal edema, no clubbing or cyanosis    Assessment/ Plan:   Diagnoses and all orders for this visit:    1. Type 2 diabetes mellitus without complication, without long-term current use of insulin (HCC)  -     glimepiride (AMARYL) 2 mg tablet; TAKE 1 TABLET BY MOUTH ONCE DAILY IN THE MORNING FOR TYPE 2 DIABETES MELLITUS  -     metFORMIN ER (GLUCOPHAGE XR) 500 mg tablet; TAKE 2 TABLETS BY MOUTH ONCE DAILY WITH DINNER  -     LIPID PANEL  -     METABOLIC PANEL, COMPREHENSIVE  -     HEMOGLOBIN A1C WITH EAG  -mild in in A1C last ov  -dwp diet and making rx changes    2. Essential hypertension, benign  -     furosemide (LASIX) 20 mg tablet; TAKE 1 TABLET BY MOUTH ONCE DAILY  -     potassium chloride SR (K-TAB) 20 mEq tablet; TAKE 1 TABLET BY MOUTH ONCE DAILY  -     potassium chloride (K-DUR, KLOR-CON) 20 mEq tablet; Take 1 Tab by mouth daily.  -     LIPID PANEL  -     METABOLIC PANEL, COMPREHENSIVE  -at goal    3. Degenerative cervical disc  -     cyclobenzaprine (FLEXERIL) 10 mg tablet; Take 1 Tab by mouth three (3) times daily as needed for Muscle Spasm(s). D/c baclofen    4. Idiopathic peripheral neuropathy  -stable    5. Mixed hyperlipidemia  -     LIPID PANEL  -     METABOLIC PANEL, COMPREHENSIVE    6. Upper respiratory tract infection, unspecified type  -     azithromycin (ZITHROMAX) 250 mg tablet; Take two tablets today then one tablet daily  -add rx    Other orders  -     ticagrelor (BRILINTA) 90 mg tablet; Take 1 Tab by mouth two (2) times a day.   -     CVD REPORT  - DIABETES PATIENT EDUCATION       Follow-up and Dispositions    · Return in about 3 months (around 6/27/2019) for dm. Lab Results   Component Value Date/Time    Cholesterol, total 121 03/27/2019 12:03 PM    HDL Cholesterol 29 (L) 03/27/2019 12:03 PM    LDL, calculated 54 03/27/2019 12:03 PM    Triglyceride 191 (H) 03/27/2019 12:03 PM     Lab Results   Component Value Date/Time    Hemoglobin A1c 7.5 (H) 03/27/2019 12:03 PM    Hemoglobin A1c 6.8 (H) 12/04/2018 10:30 AM    Hemoglobin A1c 7.1 (H) 08/23/2018 11:56 AM    Microalb/Creat ratio (ug/mg creat.) <14.3 05/25/2018 10:39 AM    LDL, calculated 54 03/27/2019 12:03 PM    Creatinine (POC) 0.7 01/04/2012 03:21 PM    Creatinine 1.13 03/27/2019 12:03 PM          Discussed with patient goal of Diabetes to include:  HgA1C <7, LDL cholesterol <100, Blood pressure <140/80. Discussed with patient diet and weight management and to get regular exercise. Recommend yearly eye exams and daily foot care. The patient understands and agrees with the plan. I have discussed the diagnosis with the patient and the intended plan as seen in the above orders. The patient has received an after-visit summary and questions were answered concerning future plans. Medication Side Effects and Warnings were discussed with patient  Patient Labs were reviewed and or requested  Patient Past Records were reviewed and or requested    Jessika Green M.D. [facility]    There are no Patient Instructions on file for this visit.

## 2019-03-28 LAB
ALBUMIN SERPL-MCNC: 4.7 G/DL (ref 3.5–5.5)
ALBUMIN/GLOB SERPL: 2 {RATIO} (ref 1.2–2.2)
ALP SERPL-CCNC: 104 IU/L (ref 39–117)
ALT SERPL-CCNC: 20 IU/L (ref 0–44)
AST SERPL-CCNC: 19 IU/L (ref 0–40)
BILIRUB SERPL-MCNC: <0.2 MG/DL (ref 0–1.2)
BUN SERPL-MCNC: 15 MG/DL (ref 6–24)
BUN/CREAT SERPL: 13 (ref 9–20)
CALCIUM SERPL-MCNC: 9.6 MG/DL (ref 8.7–10.2)
CHLORIDE SERPL-SCNC: 101 MMOL/L (ref 96–106)
CHOLEST SERPL-MCNC: 121 MG/DL (ref 100–199)
CO2 SERPL-SCNC: 23 MMOL/L (ref 20–29)
CREAT SERPL-MCNC: 1.13 MG/DL (ref 0.76–1.27)
EST. AVERAGE GLUCOSE BLD GHB EST-MCNC: 169 MG/DL
GLOBULIN SER CALC-MCNC: 2.4 G/DL (ref 1.5–4.5)
GLUCOSE SERPL-MCNC: 139 MG/DL (ref 65–99)
HBA1C MFR BLD: 7.5 % (ref 4.8–5.6)
HDLC SERPL-MCNC: 29 MG/DL
INTERPRETATION, 910389: NORMAL
LDLC SERPL CALC-MCNC: 54 MG/DL (ref 0–99)
Lab: NORMAL
POTASSIUM SERPL-SCNC: 4.6 MMOL/L (ref 3.5–5.2)
PROT SERPL-MCNC: 7.1 G/DL (ref 6–8.5)
SODIUM SERPL-SCNC: 139 MMOL/L (ref 134–144)
TRIGL SERPL-MCNC: 191 MG/DL (ref 0–149)
VLDLC SERPL CALC-MCNC: 38 MG/DL (ref 5–40)

## 2019-03-29 NOTE — PROGRESS NOTES
Spoke to pt to review lab results   Hemoglobin A1c above goal of less than 7%. Pt admits to poor diet. Reviewed diet changes. Discussed with patient goal of Diabetes to include: HgA1C <7, LDL cholesterol <100, Blood pressure <140/80. Discussed with patient diet and weight management and to get regular exercise. Recommend yearly eye exams and daily foot care. The patient understands and agrees with the plan. Labs in 3 months.

## 2019-04-02 ENCOUNTER — OFFICE VISIT (OUTPATIENT)
Dept: FAMILY MEDICINE CLINIC | Age: 54
End: 2019-04-02

## 2019-04-02 VITALS
OXYGEN SATURATION: 94 % | DIASTOLIC BLOOD PRESSURE: 76 MMHG | BODY MASS INDEX: 25.03 KG/M2 | TEMPERATURE: 97.6 F | HEIGHT: 69 IN | WEIGHT: 169 LBS | HEART RATE: 82 BPM | SYSTOLIC BLOOD PRESSURE: 114 MMHG | RESPIRATION RATE: 20 BRPM

## 2019-04-02 DIAGNOSIS — J02.9 SORE THROAT IN THE MORNING: Primary | ICD-10-CM

## 2019-04-02 DIAGNOSIS — R52 BODY ACHES: ICD-10-CM

## 2019-04-02 LAB
FLUAV+FLUBV AG NOSE QL IA.RAPID: NEGATIVE POS/NEG
FLUAV+FLUBV AG NOSE QL IA.RAPID: NEGATIVE POS/NEG
VALID INTERNAL CONTROL?: YES

## 2019-04-02 RX ORDER — LORATADINE 10 MG/1
10 TABLET ORAL DAILY
Qty: 30 TAB | Refills: 11 | Status: SHIPPED | OUTPATIENT
Start: 2019-04-02 | End: 2020-03-27

## 2019-04-02 NOTE — PROGRESS NOTES
Chief Complaint   Patient presents with    Generalized Body Aches     x 2 weeks    Nasal Congestion     finished z pack sunday     He is a 52 y.o. male who presents for evaluation. Pt thinks he might have the flu. He has had body aches, itchy ears, sore throat, cough for 2 weeks, however says he feels better today. Was seen last week for a cold and was prescribed a z pack which he finished on Sunday-z blas did not help. He has not tried anything otc for his symptoms. Sore throat is worse in the mornings and gets better throughout the day. Nothing aggravates his symptoms, nothing alleviates his symptoms. Pt is a smoker. He denies fever, sick contacts, n/v, sob, chest pain. Reviewed PmHx, RxHx, FmHx, SocHx, AllgHx and updated and dated in the chart.     Patient Active Problem List    Diagnosis    Coronary artery disease due to lipid rich plaque    Pulmonary emphysema (HCC)    Type 2 diabetes mellitus without complication, without long-term current use of insulin (HCC)    Essential hypertension, benign    Right hand pain    Pain in both feet    Peripheral neuropathy    Idiopathic peripheral neuropathy    Chronic neck pain    Cervical spondylosis    Degenerative cervical disc    Hyperlipidemia    History of MI (myocardial infarction)    RSD (reflex sympathetic dystrophy)    [facility]anoia [facility]psychosis) ([facility] [address] [facility]    Delusions of persecution    Schizoaffective disorder ([facility] [facility]       Review of Systems - negative except as listed above in the HPI    Objective:     Vitals:    04/02/19 1421   BP: 114/76   Pulse: 82   Resp: 20   Temp: 97.6 °F (36.4 °C)   SpO2: 94%   Weight: 169 lb (76.7 kg)   Height: 5' 9\" (1.753 m)     Physical Examination: General appearance - alert, well appearing, and in no distress  Mental status - alert, oriented to person, place, and time, normal mood, behavior, speech, dress, motor activity, and thought processes  Eyes - pupils equal and reactive, extraocular eye movements intact, sclera anicteric  Ears - bilateral TM's and external ear canals normal  Nose - normal and patent, no erythema, discharge or polyps  Mouth - mucous membranes moist, pharynx normal without lesions, + pnd  Neck - supple, no significant adenopathy  Chest - clear to auscultation, dim in bases bilat  Heart -  normal S1, S2, dim heart sounds    Assessment/ Plan:   Diagnoses and all orders for this visit:    1. Sore throat in the morning  -     loratadine (CLARITIN) 10 mg tablet; Take 1 Tab by mouth daily for 360 days.  -add rx   -symptomatic care  -reviewed handout   -dwp smoking cessation   2. Body aches  -     AMB POC TOSHA INFLUENZA A/B TEST  -neg        Follow-up and Dispositions    · Return in about 3 months (around 7/2/2019), or if symptoms worsen or fail to improve. I have discussed the diagnosis with the patient and the intended plan as seen in the above orders. The patient understands and agrees with the plan. The patient has received an after-visit summary and questions were answered concerning future plans. Medication Side Effects and Warnings were discussed with patient: yes  Patient Labs were reviewed and or requested: yes  Patient Past Records were reviewed and or requested: yes    Juan Luis Scott. Dulce, OKSANAC, CDE     Patient Instructions          Sore Throat: Care Instructions  Your Care Instructions    Infection by bacteria or a virus causes most sore throats. Cigarette smoke, dry air, air pollution, allergies, and yelling can also cause a sore throat. Sore throats can be painful and annoying. Fortunately, most sore throats go away on their own. If you have a bacterial infection, your doctor may prescribe antibiotics. Follow-up care is a key part of your treatment and safety. Be sure to make and go to all appointments, and call your doctor if you are having problems. It's also a good idea to know your test results and keep a list of the medicines you take.   How can you care for yourself at home?  · If your doctor prescribed antibiotics, take them as directed. Do not stop taking them just because you feel better. You need to take the full course of antibiotics. · Gargle with warm salt water once an hour to help reduce swelling and relieve discomfort. Use 1 teaspoon of salt mixed in 1 cup of warm water. · Take an over-the-counter pain medicine, such as acetaminophen (Tylenol), ibuprofen (Advil, Motrin), or naproxen (Aleve). Read and follow all instructions on the label. · Be careful when taking over-the-counter cold or flu medicines and Tylenol at the same time. Many of these medicines have acetaminophen, which is Tylenol. Read the labels to make sure that you are not taking more than the recommended dose. Too much acetaminophen (Tylenol) can be harmful. · Drink plenty of fluids. Fluids may help soothe an irritated throat. Hot fluids, such as tea or soup, may help decrease throat pain. · Use over-the-counter throat lozenges to soothe pain. Regular cough drops or hard candy may also help. These should not be given to young children because of the risk of choking. · Do not smoke or allow others to smoke around you. If you need help quitting, talk to your doctor about stop-smoking programs and medicines. These can increase your chances of quitting for good. · Use a vaporizer or humidifier to add moisture to your bedroom. Follow the directions for cleaning the machine. When should you call for help? Call your doctor now or seek immediate medical care if:    · You have new or worse trouble swallowing.     · Your sore throat gets much worse on one side.    Watch closely for changes in your health, and be sure to contact your doctor if you do not get better as expected. Where can you learn more? Go to http://celia-milton.info/. Enter 062 441 80 19 in the search box to learn more about \"Sore Throat: Care Instructions. \"  Current as of: March 27, 2018  Content Version: 11.9  © 3961-6673 [employer]. Care instructions adapted under license by [employer] (which disclaims liability or warranty for this information). If you have questions about a medical condition or this instruction, always ask your healthcare professional. [address] any warranty or liability for your use of this information.

## 2019-04-02 NOTE — PATIENT INSTRUCTIONS
Sore Throat: Care Instructions  Your Care Instructions    Infection by bacteria or a virus causes most sore throats. Cigarette smoke, dry air, air pollution, allergies, and yelling can also cause a sore throat. Sore throats can be painful and annoying. Fortunately, most sore throats go away on their own. If you have a bacterial infection, your doctor may prescribe antibiotics. Follow-up care is a key part of your treatment and safety. Be sure to make and go to all appointments, and call your doctor if you are having problems. It's also a good idea to know your test results and keep a list of the medicines you take. How can you care for yourself at home? · If your doctor prescribed antibiotics, take them as directed. Do not stop taking them just because you feel better. You need to take the full course of antibiotics. · Gargle with warm salt water once an hour to help reduce swelling and relieve discomfort. Use 1 teaspoon of salt mixed in 1 cup of warm water. · Take an over-the-counter pain medicine, such as acetaminophen (Tylenol), ibuprofen (Advil, Motrin), or naproxen (Aleve). Read and follow all instructions on the label. · Be careful when taking over-the-counter cold or flu medicines and Tylenol at the same time. Many of these medicines have acetaminophen, which is Tylenol. Read the labels to make sure that you are not taking more than the recommended dose. Too much acetaminophen (Tylenol) can be harmful. · Drink plenty of fluids. Fluids may help soothe an irritated throat. Hot fluids, such as tea or soup, may help decrease throat pain. · Use over-the-counter throat lozenges to soothe pain. Regular cough drops or hard candy may also help. These should not be given to young children because of the risk of choking. · Do not smoke or allow others to smoke around you. If you need help quitting, talk to your doctor about stop-smoking programs and medicines.  These can increase your chances of quitting for good. · Use a vaporizer or humidifier to add moisture to your bedroom. Follow the directions for cleaning the machine. When should you call for help? Call your doctor now or seek immediate medical care if:    · You have new or worse trouble swallowing.     · Your sore throat gets much worse on one side.    Watch closely for changes in your health, and be sure to contact your doctor if you do not get better as expected. Where can you learn more? Go to http://celia-milton.info/. Enter [phone] 80 [phone] in the search box to learn more about \"Sore Throat: Care Instructions. \"  Current as of: March 27, 2018  Content Version: 11.9  © 2819-6400 [employer]. Care instructions adapted under license by [employer] (which disclaims liability or warranty for this information). If you have questions about a medical condition or this instruction, always ask your healthcare professional. [address] any warranty or liability for your use of this information.

## 2019-04-15 RX ORDER — EPINEPHRINE 0.3 MG/.3ML
0.3 INJECTION SUBCUTANEOUS
Qty: 2 SYRINGE | Refills: 0 | Status: SHIPPED | OUTPATIENT
Start: 2019-04-15 | End: 2019-04-15

## 2019-09-04 ENCOUNTER — OFFICE VISIT (OUTPATIENT)
Dept: FAMILY MEDICINE CLINIC | Age: 54
End: 2019-09-04

## 2019-09-04 VITALS
TEMPERATURE: 97.8 F | BODY MASS INDEX: 23.7 KG/M2 | HEIGHT: 69 IN | HEART RATE: 80 BPM | WEIGHT: 160 LBS | RESPIRATION RATE: 18 BRPM | SYSTOLIC BLOOD PRESSURE: 129 MMHG | OXYGEN SATURATION: 95 % | DIASTOLIC BLOOD PRESSURE: 84 MMHG

## 2019-09-04 DIAGNOSIS — I10 ESSENTIAL HYPERTENSION, BENIGN: ICD-10-CM

## 2019-09-04 DIAGNOSIS — M50.30 DEGENERATIVE CERVICAL DISC: ICD-10-CM

## 2019-09-04 DIAGNOSIS — Z00.00 ROUTINE GENERAL MEDICAL EXAMINATION AT A HEALTH CARE FACILITY: Primary | ICD-10-CM

## 2019-09-04 DIAGNOSIS — E11.40 TYPE 2 DIABETES MELLITUS WITH DIABETIC NEUROPATHY, WITHOUT LONG-TERM CURRENT USE OF INSULIN (HCC): ICD-10-CM

## 2019-09-04 DIAGNOSIS — E11.9 TYPE 2 DIABETES MELLITUS WITHOUT COMPLICATION, WITHOUT LONG-TERM CURRENT USE OF INSULIN (HCC): ICD-10-CM

## 2019-09-04 RX ORDER — EPINEPHRINE 0.3 MG/.3ML
0.3 INJECTION SUBCUTANEOUS
Qty: 2 SYRINGE | Refills: 1 | Status: SHIPPED | OUTPATIENT
Start: 2019-09-04 | End: 2019-09-04

## 2019-09-04 RX ORDER — LANCETS
EACH MISCELLANEOUS
Qty: 100 EACH | Refills: 11 | Status: SHIPPED | OUTPATIENT
Start: 2019-09-04

## 2019-09-04 RX ORDER — FUROSEMIDE 20 MG/1
TABLET ORAL
Qty: 90 TAB | Refills: 1 | Status: SHIPPED | OUTPATIENT
Start: 2019-09-04 | End: 2020-06-12 | Stop reason: SDUPTHER

## 2019-09-04 RX ORDER — METFORMIN HYDROCHLORIDE 500 MG/1
TABLET, EXTENDED RELEASE ORAL
Qty: 180 TAB | Refills: 1 | Status: SHIPPED | OUTPATIENT
Start: 2019-09-04 | End: 2021-03-09 | Stop reason: SDUPTHER

## 2019-09-04 RX ORDER — CYCLOBENZAPRINE HCL 10 MG
10 TABLET ORAL
Qty: 270 TAB | Refills: 3 | Status: SHIPPED | OUTPATIENT
Start: 2019-09-04 | End: 2020-09-17

## 2019-09-04 RX ORDER — POTASSIUM CHLORIDE 1500 MG/1
TABLET, FILM COATED, EXTENDED RELEASE ORAL
Qty: 90 TAB | Refills: 1 | Status: SHIPPED | OUTPATIENT
Start: 2019-09-04 | End: 2021-03-01

## 2019-09-04 RX ORDER — POTASSIUM CHLORIDE 20 MEQ/1
20 TABLET, EXTENDED RELEASE ORAL DAILY
Qty: 90 TAB | Refills: 1 | Status: SHIPPED | OUTPATIENT
Start: 2019-09-04

## 2019-09-04 RX ORDER — GLIMEPIRIDE 2 MG/1
TABLET ORAL
Qty: 90 TAB | Refills: 1 | Status: SHIPPED | OUTPATIENT
Start: 2019-09-04 | End: 2020-08-05 | Stop reason: SDUPTHER

## 2019-09-04 NOTE — PROGRESS NOTES
Patient here for dm, fasting labs and med refill. 1. Have you been to the ER, urgent care clinic since your last visit? Hospitalized since your last visit? No    2. Have you seen or consulted any other health care providers outside of the [address] [place] [address] since your last visit? Include any pap smears or colon screening. No       Charlee Fabian  9/4/2019  Provider:   [place]:  Diabetes Report Card   1) Have you seen the eye doctor in past year?yes    2) How would you  rate your Diabetic Diet?good   3) How well do you take care of your feet? well   4) Do you keep your Primary Care Follow Up Appts? yes    5) Do you know your A1C goal?yes    6) Do you take your medications daily? yes    7) Do you check your blood sugars? yes    8) Have you gained weight?no       9) Do you follow an exercise program?no    10) Can you do better?yes      Lab Results   Component Value Date/Time    Cholesterol, total 121 03/27/2019 12:03 PM    HDL Cholesterol 29 (L) 03/27/2019 12:03 PM    LDL, calculated 54 03/27/2019 12:03 PM    Triglyceride 191 (H) 03/27/2019 12:03 PM     Lab Results   Component Value Date/Time    Hemoglobin A1c 7.5 (H) 03/27/2019 12:03 PM    Hemoglobin A1c 6.8 (H) 12/04/2018 10:30 AM    Hemoglobin A1c 7.1 (H) 08/23/2018 11:56 AM    Glucose 139 (H) 03/27/2019 12:03 PM    Glucose (POC) 193 (H) 09/02/2011 06:15 PM    Microalb/Creat ratio (ug/mg creat.) <14.3 05/25/2018 10:39 AM    LDL, calculated 54 03/27/2019 12:03 PM    Creatinine (POC) 0.7 01/04/2012 03:21 PM    Creatinine 1.13 03/27/2019 12:03 PM        Chief Complaint   Patient presents with    Diabetes     fasting     he is a 52y.o. year old male who presents for evalution. Reviewed PmHx, RxHx, FmHx, SocHx, AllgHx and updated and dated in the chart.     Patient Active Problem List    Diagnosis    Type 2 diabetes mellitus with diabetic neuropathy ([address] [address]    Coronary artery disease due to lipid rich plaque    Pulmonary emphysema (HCC)    Type 2 diabetes mellitus without complication, without long-term current use of insulin (HCC)    Essential hypertension, benign    Right hand pain    Pain in both feet    Peripheral neuropathy    Idiopathic peripheral neuropathy    Chronic neck pain    Cervical spondylosis    Degenerative cervical disc    Hyperlipidemia    History of MI (myocardial infarction)    RSD (reflex sympathetic dystrophy)    [facility]anoia (psychosis) ([facility]    Delusions of persecution    Schizoaffective disorder ([facility]       Review of Systems - negative except as listed above in the HPI    Objective:     Vitals:    09/04/19 0932   BP: 129/84   Pulse: 80   Resp: 18   Temp: 97.8 °F (36.6 °C)   SpO2: 95%   Weight: 160 lb (72.6 kg)   Height: 5' 9\" (1.753 m)     Physical Examination: General appearance - alert, well appearing, and in no distress  Chest - clear to auscultation, no wheezes, rales or rhonchi, symmetric air entry  Heart - normal rate, regular rhythm, normal S1, S2, no murmurs, rubs, clicks or gallops  Abdomen - soft, nontender, nondistended, no masses or organomegaly  Extremities - peripheral pulses normal, no pedal edema, no clubbing or cyanosis    Assessment/ Plan:   Diagnoses and all orders for this visit:    1. Routine general medical examination at a health care facility  -see below    2. Type 2 diabetes mellitus without complication, without long-term current use of insulin (HCC)  -     lancets misc; Test TID with one touch verio; dx: E11.9  -     glimepiride (AMARYL) 2 mg tablet; TAKE 1 TABLET BY MOUTH ONCE DAILY IN THE MORNING FOR TYPE 2 DIABETES MELLITUS  -     metFORMIN ER (GLUCOPHAGE XR) 500 mg tablet; TAKE 2 TABLETS BY MOUTH ONCE DAILY WITH DINNER  -     OTHER; Alcohol pads  Dx: DM  -     LIPID PANEL  -     METABOLIC PANEL, COMPREHENSIVE  -     HEMOGLOBIN A1C WITH EAG  -     CBC WITH AUTOMATED DIFF  -     TSH 3RD GENERATION  -     MICROALBUMIN, UR, RAND W/ MICROALB/CREAT RATIO    3.  Essential hypertension, benign  - furosemide (LASIX) 20 mg tablet; TAKE 1 TABLET BY MOUTH ONCE DAILY  -     potassium chloride SR (K-TAB) 20 mEq tablet; TAKE 1 TABLET BY MOUTH ONCE DAILY  -     potassium chloride (K-DUR, KLOR-CON) 20 mEq tablet; Take 1 Tab by mouth daily.  -     LIPID PANEL  -     METABOLIC PANEL, COMPREHENSIVE  Lab Results   Component Value Date/Time    Hemoglobin A1c 7.5 (H) 03/27/2019 12:03 PM     -not at goal  -dwp diet    4. Degenerative cervical disc  -     cyclobenzaprine (FLEXERIL) 10 mg tablet; Take 1 Tab by mouth three (3) times daily as needed for Muscle Spasm(s). D/c baclofen    5. Type 2 diabetes mellitus with diabetic neuropathy, without long-term current use of insulin ([facility])  -     LIPID PANEL  -     METABOLIC PANEL, COMPREHENSIVE  -     HEMOGLOBIN A1C WITH EAG  -     CBC WITH AUTOMATED DIFF  -     TSH 3RD GENERATION  -     MICROALBUMIN, UR, RAND W/ MICROALB/CREAT RATIO    Other orders  -     glucose blood VI test strips (ASCENSIA AUTODISC VI, ONE TOUCH ULTRA TEST VI) strip; OneTouch Verio; pt is in cardiac rehab and is testing more frequently; dx: E11.65; test TID  -     ticagrelor (BRILINTA) 90 mg tablet; Take 1 Tab by mouth two (2) times a day. -     EPINEPHrine (EPIPEN) 0.3 mg/0.3 mL injection; 0.3 mL by IntraMUSCular route once as needed for Anaphylaxis or Allergic Response for up to 1 dose. -Patient is in good health  -Discussed with patient cancer risk factors and screens needed  -Colonoscopy was recommended based on current guidelines for screening.  -Labs from previous visits were discussed with patient yes  -Discussed with patient diet and exercise  -Immunizations appropriate for age were discussed with pt and updated  -  Follow-up and Dispositions    · Return in about 3 months (around 12/4/2019) for dm. I have discussed the diagnosis with the patient and the intended plan as seen in the above orders. The patient understands and agrees with the plan.   The patient has received an after-visit summary and questions were answered concerning future plans. Medication Side Effects and Warnings were discussed with patient  Patient Labs were reviewed and or requested  Patient Past Records were reviewed and or requested     There are no Patient Instructions on file for this visit.         Bonny Ospina M.D.

## 2019-09-05 LAB
ALBUMIN SERPL-MCNC: 5.1 G/DL (ref 3.5–5.5)
ALBUMIN/CREAT UR: 18.7 MG/G CREAT (ref 0–30)
ALBUMIN/GLOB SERPL: 1.8 {RATIO} (ref 1.2–2.2)
ALP SERPL-CCNC: 109 IU/L (ref 39–117)
ALT SERPL-CCNC: 18 IU/L (ref 0–44)
AST SERPL-CCNC: 18 IU/L (ref 0–40)
BASOPHILS # BLD AUTO: 0 X10E3/UL (ref 0–0.2)
BASOPHILS NFR BLD AUTO: 0 %
BILIRUB SERPL-MCNC: 0.2 MG/DL (ref 0–1.2)
BUN SERPL-MCNC: 18 MG/DL (ref 6–24)
BUN/CREAT SERPL: 15 (ref 9–20)
CALCIUM SERPL-MCNC: 10.5 MG/DL (ref 8.7–10.2)
CHLORIDE SERPL-SCNC: 101 MMOL/L (ref 96–106)
CHOLEST SERPL-MCNC: 123 MG/DL (ref 100–199)
CO2 SERPL-SCNC: 22 MMOL/L (ref 20–29)
CREAT SERPL-MCNC: 1.22 MG/DL (ref 0.76–1.27)
CREAT UR-MCNC: 50.8 MG/DL
EOSINOPHIL # BLD AUTO: 0.1 X10E3/UL (ref 0–0.4)
EOSINOPHIL NFR BLD AUTO: 1 %
ERYTHROCYTE [DISTWIDTH] IN BLOOD BY AUTOMATED COUNT: 13.9 % (ref 12.3–15.4)
EST. AVERAGE GLUCOSE BLD GHB EST-MCNC: 151 MG/DL
GLOBULIN SER CALC-MCNC: 2.9 G/DL (ref 1.5–4.5)
GLUCOSE SERPL-MCNC: 130 MG/DL (ref 65–99)
HBA1C MFR BLD: 6.9 % (ref 4.8–5.6)
HCT VFR BLD AUTO: 46 % (ref 37.5–51)
HDLC SERPL-MCNC: 35 MG/DL
HGB BLD-MCNC: 15.4 G/DL (ref 13–17.7)
IMM GRANULOCYTES # BLD AUTO: 0 X10E3/UL (ref 0–0.1)
IMM GRANULOCYTES NFR BLD AUTO: 1 %
INTERPRETATION, 910389: NORMAL
LDLC SERPL CALC-MCNC: 58 MG/DL (ref 0–99)
LYMPHOCYTES # BLD AUTO: 1.2 X10E3/UL (ref 0.7–3.1)
LYMPHOCYTES NFR BLD AUTO: 15 %
Lab: NORMAL
MCH RBC QN AUTO: 31.3 PG (ref 26.6–33)
MCHC RBC AUTO-ENTMCNC: 33.5 G/DL (ref 31.5–35.7)
MCV RBC AUTO: 94 FL (ref 79–97)
MICROALBUMIN UR-MCNC: 9.5 UG/ML
MONOCYTES # BLD AUTO: 0.4 X10E3/UL (ref 0.1–0.9)
MONOCYTES NFR BLD AUTO: 5 %
NEUTROPHILS # BLD AUTO: 6.4 X10E3/UL (ref 1.4–7)
NEUTROPHILS NFR BLD AUTO: 78 %
PLATELET # BLD AUTO: 232 X10E3/UL (ref 150–450)
POTASSIUM SERPL-SCNC: 4.9 MMOL/L (ref 3.5–5.2)
PROT SERPL-MCNC: 8 G/DL (ref 6–8.5)
RBC # BLD AUTO: 4.92 X10E6/UL (ref 4.14–5.8)
SODIUM SERPL-SCNC: 140 MMOL/L (ref 134–144)
TRIGL SERPL-MCNC: 148 MG/DL (ref 0–149)
TSH SERPL DL<=0.005 MIU/L-ACNC: 0.9 UIU/ML (ref 0.45–4.5)
VLDLC SERPL CALC-MCNC: 30 MG/DL (ref 5–40)
WBC # BLD AUTO: 8.1 X10E3/UL (ref 3.4–10.8)

## 2019-09-05 NOTE — PROGRESS NOTES
After reviewing your labs, I believe they are within normal  limits for your age. Keep working hard on diet and taking your medications that are prescribed. If you have any acute care needs and are having trouble getting an appointment. .. please send me a   [facility] [place] message or have the  send me a message. Have a blessed day and  be kind  to others! If you have any questions, feel free to email thru [facility] [place], or give us   a call back at [phone]. Pegge Kehr, M.D.   Good Help to Those in Need  \"You maybe whatever you resolve to be\"

## 2020-03-06 ENCOUNTER — DOCUMENTATION ONLY (OUTPATIENT)
Dept: FAMILY MEDICINE CLINIC | Age: 55
End: 2020-03-06

## 2020-03-06 NOTE — PROGRESS NOTES
[address] medical records request was faxed to Stephanie at [phone] to be processed on 03/06/2020

## 2020-06-11 DIAGNOSIS — I10 ESSENTIAL HYPERTENSION, BENIGN: ICD-10-CM

## 2020-06-12 RX ORDER — FUROSEMIDE 20 MG/1
TABLET ORAL
Qty: 90 TAB | Refills: 0 | Status: SHIPPED | OUTPATIENT
Start: 2020-06-12 | End: 2020-08-18 | Stop reason: SDUPTHER

## 2020-06-23 ENCOUNTER — VIRTUAL VISIT (OUTPATIENT)
Dept: FAMILY MEDICINE CLINIC | Age: 55
End: 2020-06-23

## 2020-06-23 DIAGNOSIS — J43.9 PULMONARY EMPHYSEMA, UNSPECIFIED EMPHYSEMA TYPE (HCC): ICD-10-CM

## 2020-06-23 DIAGNOSIS — E11.40 TYPE 2 DIABETES MELLITUS WITH DIABETIC NEUROPATHY, WITHOUT LONG-TERM CURRENT USE OF INSULIN (HCC): ICD-10-CM

## 2020-06-23 DIAGNOSIS — E11.9 TYPE 2 DIABETES MELLITUS WITHOUT COMPLICATION, WITHOUT LONG-TERM CURRENT USE OF INSULIN (HCC): Primary | ICD-10-CM

## 2020-06-23 DIAGNOSIS — F22 PARANOIA (PSYCHOSIS) (HCC): ICD-10-CM

## 2020-06-23 DIAGNOSIS — F25.0 SCHIZOAFFECTIVE DISORDER, BIPOLAR TYPE (HCC): ICD-10-CM

## 2020-06-23 NOTE — PROGRESS NOTES
Here for VV via Tele     Had an  [id number] to us on 3rd June    [facility]    Does not know test results    Consent: Cortez Zepeda, who was seen by synchronous (real-time) audio-video technology, and/or his healthcare decision maker, is aware that this patient-initiated, Telehealth encounter on 6/23/2020 is a billable service, with coverage as determined by his insurance carrier. He is aware that he may receive a bill and has provided verbal consent to proceed: Yes. 712  Subjective:   Cortez Zepeda is a 62 y.o. male who was seen for No chief complaint on file. Prior to Admission medications    Medication Sig Start Date End Date Taking? Authorizing Provider   furosemide (LASIX) 20 mg tablet Take 1 tablet by mouth once daily 6/12/20   Anabell Solano MD   ibuprofen (MOTRIN) 800 mg tablet Take 1 Tab by mouth every eight (8) hours as needed for Pain. 9/10/19   Anabell Solano MD   glucose blood VI test strips (ASCENSIA AUTODISC VI, ONE TOUCH ULTRA TEST VI) strip OneTouch Verio; pt is in cardiac rehab and is testing more frequently; dx: E11.65; test TID 9/4/19   Anabell Solano MD   lancets misc Test TID with one touch verio; dx: E11.9 9/4/19   Anabell Solano MD   potassium chloride SR (K-TAB) 20 mEq tablet TAKE 1 TABLET BY MOUTH ONCE DAILY 9/4/19   Anabell Solano MD   glimepiride (AMARYL) 2 mg tablet TAKE 1 TABLET BY MOUTH ONCE DAILY IN THE MORNING FOR TYPE 2 DIABETES MELLITUS 9/4/19   Anabell Solano MD   metFORMIN ER (GLUCOPHAGE XR) 500 mg tablet TAKE 2 TABLETS BY MOUTH ONCE DAILY WITH DINNER 9/4/19   Anabell Solano MD   cyclobenzaprine (FLEXERIL) 10 mg tablet Take 1 Tab by mouth three (3) times daily as needed for Muscle Spasm(s). D/c baclofen 9/4/19   Anabell Solano MD   ticagrelor (BRILINTA) 90 mg tablet Take 1 Tab by mouth two (2) times a day. 9/4/19   Anabell Solano MD   potassium chloride (K-DUR, KLOR-CON) 20 mEq tablet Take 1 Tab by mouth daily.  9/4/19   Anabell Solano MD   OTHER Alcohol pads  Dx: DM 9/4/19   Kayley Nieto MD   hydrOXYzine HCl (ATARAX) 10 mg tablet Take 10 mg by mouth three (3) times daily as needed. States taking 50 mg 3 times daily    Provider, Historical   gemfibrozil (LOPID) 600 mg tablet Take 600 mg by mouth two (2) times a day. Provider, Historical   aspirin delayed-release 81 mg tablet Take  by mouth daily. Provider, Historical   DULoxetine (CYMBALTA) 30 mg capsule Take 30 mg by mouth daily. Provider, Historical   atorvastatin (LIPITOR) 40 mg tablet Take 40 mg by mouth nightly. Provider, Historical   perphenazine (TRILAFON) 4 mg tablet Take 4 mg by mouth two (2) times a day. Provider, Historical   multivitamin, tx-iron-ca-min (THERA-M W/ IRON) 9 mg iron-400 mcg tab tablet Take 1 Tab by mouth daily. Provider, Historical   nitroglycerin (NITROSTAT) 0.4 mg SL tablet 0.4 mg by SubLINGual route every five (5) minutes as needed for Chest Pain. Up to 3 doses. Provider, Historical   Blood-Glucose Meter monitoring kit One Touch Verio; test daily; dx: E11.9 4/16/18   Darling Wynn NP   BD SINGLE USE SWABS REGULAR padm USE TO TEST EVERY DAY 2/19/18   Kayley Nieto MD   Lancets misc Check bs once a day. 2/5/18   Kayley Nieto MD   DULoxetine (CYMBALTA) 60 mg capsule Take 1 Cap by mouth two (2) times a day. Patient taking differently: Take 60 mg by mouth nightly. 5/30/17   Kayley Nieto MD   Blood-Glucose Meter monitoring kit Check BS once a day. 2/2/17   Darling Wynn NP   QUEtiapine (SEROQUEL) 300 mg tablet Take 600 mg by mouth nightly. Sherly Barnett MD   QUEtiapine (SEROQUEL) 200 mg tablet Take 200 mg by mouth every morning. Sherly Barnett MD   Polyethylene Glycol 3350 powd 1 Cap by Does Not Apply route daily.     Provider, Historical     Allergies   Allergen Reactions    Bee Sting [Sting, Bee] Anaphylaxis    Demerol [Meperidine] Anaphylaxis    Bee Sting [Sting, Bee] Hives    Demerol [Meperidine] Palpitations     Patient Active Problem List    Diagnosis    Type 2 diabetes mellitus with diabetic neuropathy ([address]    Coronary artery disease due to lipid rich plaque    Pulmonary emphysema (HCC)    Type 2 diabetes mellitus without complication, without long-term current use of insulin (HCC)    Essential hypertension, benign    Right hand pain    Pain in both feet    Peripheral neuropathy    Idiopathic peripheral neuropathy    Chronic neck pain    Cervical spondylosis    Degenerative cervical disc    Hyperlipidemia    History of MI (myocardial infarction)    RSD (reflex sympathetic dystrophy)    Paranoia (psychosis) ([address]    Delusions of persecution    Schizoaffective disorder ([address]     Patient Active Problem List   Diagnosis Code    Schizoaffective disorder ([address] F25.9    Paranoia (psychosis) ([address] F22    Delusions of persecution     RSD (reflex sympathetic dystrophy) G90.50    Hyperlipidemia E78.5    History of MI (myocardial infarction) I25.2    Right hand pain M79.641    Pain in both feet M79.671, M79.672    Peripheral neuropathy G62.9    Idiopathic peripheral neuropathy G60.9    Chronic neck pain M54.2, G89.29    Cervical spondylosis M47.812    Degenerative cervical disc M50.30    Essential hypertension, benign I10    Type 2 diabetes mellitus without complication, without long-term current use of insulin ([address] E11.9    Coronary artery disease due to lipid rich plaque I25.10, I25.83    Pulmonary emphysema ([address] J43.9    Type 2 diabetes mellitus with diabetic neuropathy ([address] E11.40     Patient Active Problem List    Diagnosis Date Noted    Type 2 diabetes mellitus with diabetic neuropathy ([address] 09/04/2019    Coronary artery disease due to lipid rich plaque 08/23/2018    Pulmonary emphysema ([address] 08/23/2018    Type 2 diabetes mellitus without complication, without long-term current use of insulin ([address] 02/02/2017    Essential hypertension, benign 05/18/2015    Right hand pain 06/26/2014    Pain in both feet 06/26/2014    Peripheral neuropathy 06/26/2014    Idiopathic peripheral neuropathy 06/26/2014    Chronic neck pain 06/26/2014    Cervical spondylosis 06/26/2014    Degenerative cervical disc 06/26/2014    Hyperlipidemia 07/03/2012    History of MI (myocardial infarction) 07/03/2012    RSD (reflex sympathetic dystrophy) 08/31/2011    Paranoia (psychosis) [facility] 05/03/2011    Delusions of persecution 05/03/2011    [facility] disorder [facility] 08/25/2010       ROS    Objective:   Vital Signs: (As obtained by patient/caregiver at home)  There were no vitals taken for this visit. [INSTRUCTIONS:  \"[x]\" Indicates a positive item  \"[]\" Indicates a negative item  -- DELETE ALL ITEMS NOT EXAMINED]    Constitutional: [x] Appears well-developed and well-nourished [x] No apparent distress      [] Abnormal -     Mental status: [] Alert and awake  [x] Oriented to person/place/time [x] Able to follow commands    [] Abnormal -     Eyes:   EOM    []  Normal    [] Abnormal -   Sclera  []  Normal    [] Abnormal -          Discharge []  None visible   [] Abnormal -     HENT: [] Normocephalic, atraumatic  [] Abnormal -   [] Mouth/Throat: Mucous membranes are moist    External Ears [] Normal  [] Abnormal -    Neck: [] No visualized mass [] Abnormal -     Pulmonary/Chest: [] Respiratory effort normal   [] No visualized signs of difficulty breathing or respiratory distress        [] Abnormal -        Neurological:        [] No Facial Asymmetry (Cranial nerve 7 motor function) (limited exam due to video visit)          [] No gaze palsy        [] Abnormal -          Skin:        [] No significant exanthematous lesions or discoloration noted on facial skin         [] Abnormal -            Psychiatric:       [x] Normal Affect [] Abnormal -        [x] No Hallucinations    Other pertinent observable physical exam findings:-              Assessment & Plan:   Diagnoses and all orders for this visit:    1.  Type 2 diabetes mellitus without complication, without long-term current use of insulin ([address]  2. Type 2 diabetes mellitus with diabetic neuropathy, without long-term current use of insulin ([address]  3. Paranoia (psychosis) ([address]  4. Schizoaffective disorder, bipolar type ([address]  5. Pulmonary emphysema, unspecified emphysema type ([address]  -need to get labs from [facility]  -states doing well at home  -need refills  -is seeing specialist          Follow-up and Dispositions    · Return in about 3 months (around 9/23/2020). I spent at least 15 minutes with this established patient, and >50% of the time was spent counseling and/or coordinating care regarding d    We discussed the expected course, resolution and complications of the diagnosis(es) in detail. Medication risks, benefits, costs, interactions, and alternatives were discussed as indicated. I advised him to contact the office if his condition worsens, changes or fails to improve as anticipated. He expressed understanding with the diagnosis(es) and plan. Len Arreaga is a 62 y.o. male being evaluated by a video visit encounter for concerns as above. A caregiver was present when appropriate. Due to this being a TeleHealth encounter (During [place] [address] public health emergency), evaluation of the following organ systems was limited: Vitals/Constitutional/EENT/Resp/CV/GI//MS/Neuro/Skin/Heme-Lymph-Imm. Pursuant to the emergency declaration under the [facility], [facility] waiver authority and the [employer] and [employer] Act, this Virtual  Visit was conducted, with patient's (and/or legal guardian's) consent, to reduce the patient's risk of exposure to COVID-19 and provide necessary medical care. Services were provided through a video synchronous discussion virtually to substitute for in-person clinic visit. Patient and provider were located at their individual homes.         Medardo Pringle MD

## 2020-06-26 ENCOUNTER — TELEPHONE (OUTPATIENT)
Dept: FAMILY MEDICINE CLINIC | Age: 55
End: 2020-06-26

## 2020-06-26 NOTE — TELEPHONE ENCOUNTER
Called member services of [address] [place] [address]. Transferred to physician line. [facility] that we needed latest labs faxed to Dr. Madeline Covington, [phone].

## 2020-06-26 NOTE — TELEPHONE ENCOUNTER
----- Message from Maria Eugenia Montgomery MD sent at 6/23/2020  1:35 PM EDT -----  Need A1C from [facility] labs

## 2020-06-30 LAB — SARS-COV-2, NAA: NOT DETECTED

## 2020-07-05 NOTE — TELEPHONE ENCOUNTER
A1c went form 6.9 to 10.3 in less than a year. Pt needs repeat VV to dwp changes and find out what has happened.     [facility]

## 2020-07-08 ENCOUNTER — TELEPHONE (OUTPATIENT)
Dept: FAMILY MEDICINE CLINIC | Age: 55
End: 2020-07-08

## 2020-07-08 ENCOUNTER — VIRTUAL VISIT (OUTPATIENT)
Dept: FAMILY MEDICINE CLINIC | Age: 55
End: 2020-07-08

## 2020-07-08 DIAGNOSIS — I10 ESSENTIAL HYPERTENSION, BENIGN: ICD-10-CM

## 2020-07-08 DIAGNOSIS — F25.0 SCHIZOAFFECTIVE DISORDER, BIPOLAR TYPE (HCC): ICD-10-CM

## 2020-07-08 DIAGNOSIS — E11.40 TYPE 2 DIABETES MELLITUS WITH DIABETIC NEUROPATHY, WITHOUT LONG-TERM CURRENT USE OF INSULIN (HCC): Primary | ICD-10-CM

## 2020-07-08 NOTE — PROGRESS NOTES
Elevated bsg at home and inc A1C to 10.1    States went off his diet and had labs at [facility]      Consent: Aris Mathur, who was seen by synchronous (real-time) audio-video technology, and/or his healthcare decision maker, is aware that this patient-initiated, Telehealth encounter on 7/8/2020 is a billable service, with coverage as determined by his insurance carrier. He is aware that he may receive a bill and has provided verbal consent to proceed: YES-Consent obtained within past 12 months        712  Subjective:   Aris Mathur is a 62 y.o. male who was seen for No chief complaint on file. Prior to Admission medications    Medication Sig Start Date End Date Taking? Authorizing Provider   SITagliptin (JANUVIA) 100 mg tablet Take 1 Tab by mouth daily. 7/8/20  Yes Michael Boo MD   furosemide (LASIX) 20 mg tablet Take 1 tablet by mouth once daily 6/12/20   Michael Boo MD   ibuprofen (MOTRIN) 800 mg tablet Take 1 Tab by mouth every eight (8) hours as needed for Pain. 9/10/19   Michael Boo MD   glucose blood VI test strips (ASCENSIA AUTODISC VI, ONE TOUCH ULTRA TEST VI) strip OneTouch Verio; pt is in cardiac rehab and is testing more frequently; dx: E11.65; test TID 9/4/19   Michael Boo MD   lancets misc Test TID with one touch verio; dx: E11.9 9/4/19   Michael Boo MD   potassium chloride SR (K-TAB) 20 mEq tablet TAKE 1 TABLET BY MOUTH ONCE DAILY 9/4/19   Michael Boo MD   glimepiride (AMARYL) 2 mg tablet TAKE 1 TABLET BY MOUTH ONCE DAILY IN THE MORNING FOR TYPE 2 DIABETES MELLITUS 9/4/19   Michael Boo MD   metFORMIN ER (GLUCOPHAGE XR) 500 mg tablet TAKE 2 TABLETS BY MOUTH ONCE DAILY WITH DINNER 9/4/19   Michael Boo MD   cyclobenzaprine (FLEXERIL) 10 mg tablet Take 1 Tab by mouth three (3) times daily as needed for Muscle Spasm(s). D/c baclofen 9/4/19   Michael Boo MD   ticagrelor (BRILINTA) 90 mg tablet Take 1 Tab by mouth two (2) times a day.  9/4/19   Michael Boo MD potassium chloride (K-DUR, KLOR-CON) 20 mEq tablet Take 1 Tab by mouth daily. 9/4/19   Meche Jaimes MD   OTHER Alcohol pads  Dx: DM 9/4/19   Meche Jaimes MD   hydrOXYzine HCl (ATARAX) 10 mg tablet Take 10 mg by mouth three (3) times daily as needed. States taking 50 mg 3 times daily    Provider, Historical   gemfibrozil (LOPID) 600 mg tablet Take 600 mg by mouth two (2) times a day. Provider, Historical   aspirin delayed-release 81 mg tablet Take  by mouth daily. Provider, Historical   DULoxetine (CYMBALTA) 30 mg capsule Take 30 mg by mouth daily. Provider, Historical   atorvastatin (LIPITOR) 40 mg tablet Take 40 mg by mouth nightly. Provider, Historical   perphenazine (TRILAFON) 4 mg tablet Take 4 mg by mouth two (2) times a day. Provider, Historical   multivitamin, tx-iron-ca-min (THERA-M W/ IRON) 9 mg iron-400 mcg tab tablet Take 1 Tab by mouth daily. Provider, Historical   nitroglycerin (NITROSTAT) 0.4 mg SL tablet 0.4 mg by SubLINGual route every five (5) minutes as needed for Chest Pain. Up to 3 doses. Provider, Historical   Blood-Glucose Meter monitoring kit One Touch Verio; test daily; dx: E11.9 4/16/18   Allen Pace NP   BD SINGLE USE SWABS REGULAR padm USE TO TEST EVERY DAY 2/19/18   Meche Jaimes MD   Lancets misc Check bs once a day. 2/5/18   Meche Jaimes MD   DULoxetine (CYMBALTA) 60 mg capsule Take 1 Cap by mouth two (2) times a day. Patient taking differently: Take 60 mg by mouth nightly. 5/30/17   Meche Jaimes MD   Blood-Glucose Meter monitoring kit Check BS once a day. 2/2/17   Allen Pace NP   QUEtiapine (SEROQUEL) 300 mg tablet Take 600 mg by mouth nightly. Other, MD Shelry   QUEtiapine (SEROQUEL) 200 mg tablet Take 200 mg by mouth every morning. Other, MD Sherly   Polyethylene Glycol 3350 powd 1 Cap by Does Not Apply route daily.     Provider, Historical     Allergies   Allergen Reactions    Bee Sting [Sting, Bee] Anaphylaxis    Demerol [Meperidine] Anaphylaxis    Bee Sting [Sting, Bee] Hives    Demerol [Meperidine] Palpitations     Patient Active Problem List    Diagnosis    Type 2 diabetes mellitus with diabetic neuropathy (HCC)    Coronary artery disease due to lipid rich plaque    Pulmonary emphysema (HCC)    Type 2 diabetes mellitus without complication, without long-term current use of insulin (HCC)    Essential hypertension, benign    Right hand pain    Pain in both feet    Peripheral neuropathy    Idiopathic peripheral neuropathy    Chronic neck pain    Cervical spondylosis    Degenerative cervical disc    Hyperlipidemia    History of MI (myocardial infarction)    RSD (reflex sympathetic dystrophy)    Paranoia (psychosis) ([address]    Delusions of persecution    Schizoaffective disorder ([address]       Patient Active Problem List   Diagnosis Code    Schizoaffective disorder ([address] F25.9    Paranoia (psychosis) ([address] F22    Delusions of persecution     RSD (reflex sympathetic dystrophy) G90.50    Hyperlipidemia E78.5    History of MI (myocardial infarction) I25.2    Right hand pain M79.641    Pain in both feet M79.671, M79.672    Peripheral neuropathy G62.9    Idiopathic peripheral neuropathy G60.9    Chronic neck pain M54.2, G89.29    Cervical spondylosis M47.812    Degenerative cervical disc M50.30    Essential hypertension, benign I10    Type 2 diabetes mellitus without complication, without long-term current use of insulin ([facility]) E11.9    Coronary artery disease due to lipid rich plaque I25.10, I25.83    Pulmonary emphysema ([address] J43.9    Type 2 diabetes mellitus with diabetic neuropathy ([facility]) E11.40     Patient Active Problem List    Diagnosis Date Noted    Type 2 diabetes mellitus with diabetic neuropathy ([address] 09/04/2019    Coronary artery disease due to lipid rich plaque 08/23/2018    Pulmonary emphysema ([address] 08/23/2018    Type 2 diabetes mellitus without complication, without long-term current use of insulin ([facility] 02/02/2017    Essential hypertension, benign 05/18/2015    Right hand pain 06/26/2014    Pain in both feet 06/26/2014    Peripheral neuropathy 06/26/2014    Idiopathic peripheral neuropathy 06/26/2014    Chronic neck pain 06/26/2014    Cervical spondylosis 06/26/2014    Degenerative cervical disc 06/26/2014    Hyperlipidemia 07/03/2012    History of MI (myocardial infarction) 07/03/2012    RSD (reflex sympathetic dystrophy) 08/31/2011    Paranoia (psychosis) ([facility] 05/03/2011    Delusions of persecution 05/03/2011    Schizoaffective disorder ([facility] 08/25/2010       ROS    Objective:   Vital Signs: (As obtained by patient/caregiver at home)  There were no vitals taken for this visit.      [INSTRUCTIONS:  \"[x]\" Indicates a positive item  \"[]\" Indicates a negative item  -- DELETE ALL ITEMS NOT EXAMINED]    Constitutional: [x] Appears well-developed and well-nourished [x] No apparent distress      [] Abnormal -     Mental status: [x] Alert and awake  [x] Oriented to person/place/time [x] Able to follow commands    [] Abnormal -     Eyes:   EOM    [x]  Normal    [] Abnormal -   Sclera  [x]  Normal    [] Abnormal -          Discharge [x]  None visible   [] Abnormal -     HENT: [x] Normocephalic, atraumatic  [] Abnormal -   [x] Mouth/Throat: Mucous membranes are moist    External Ears [x] Normal  [] Abnormal -    Neck: [x] No visualized mass [] Abnormal -     Pulmonary/Chest: [x] Respiratory effort normal   [x] No visualized signs of difficulty breathing or respiratory distress        [] Abnormal -        Neurological:        [x] No Facial Asymmetry (Cranial nerve 7 motor function) (limited exam due to video visit)          [x] No gaze palsy        [] Abnormal -          Skin:        [x] No significant exanthematous lesions or discoloration noted on facial skin         [] Abnormal -            Psychiatric:       [x] Normal Affect [] Abnormal -        [x] No Hallucinations    Other pertinent observable physical exam findings:-              Assessment & Plan:   Diagnoses and all orders for this visit:    1. Type 2 diabetes mellitus with diabetic neuropathy, without long-term current use of insulin (HCC)  -     SITagliptin (JANUVIA) 100 mg tablet; Take 1 Tab by mouth daily.  -last A1c=10. By [employer]  -dwp to only get labs thru [facility] so we are aware of labs  -add new rx  -dwp diet and check in 3 months      2. Essential hypertension, benign  -at goal at home    3. Schizoaffective disorder, bipolar type ([facility]  -stable          Follow-up and Dispositions    · Return in about 3 months (around 10/8/2020). We discussed the expected course, resolution and complications of the diagnosis(es) in detail. Medication risks, benefits, costs, interactions, and alternatives were discussed as indicated. I advised him to contact the office if his condition worsens, changes or fails to improve as anticipated. He expressed understanding with the diagnosis(es) and plan. Levon Adams is a 62 y.o. male being evaluated by a video visit encounter for concerns as above. A caregiver was present when appropriate. Due to this being a TeleHealth encounter ([facility]), evaluation of the following organ systems was limited: Vitals/Constitutional/EENT/Resp/CV/GI//MS/Neuro/Skin/Heme-Lymph-Imm. Pursuant to the emergency declaration under the [facility], [facility] waiver authority and the [employer] and [employer] Act, this Virtual  Visit was conducted, with patient's (and/or legal guardian's) consent, to reduce the patient's risk of exposure to COVID-19 and provide necessary medical care. Services were provided through a video synchronous discussion virtually to substitute for in-person clinic visit. Patient and provider were located at their individual homes.         Vincenzo Almaraz MD

## 2020-07-24 DIAGNOSIS — E11.9 TYPE 2 DIABETES MELLITUS WITHOUT COMPLICATION, WITHOUT LONG-TERM CURRENT USE OF INSULIN (HCC): ICD-10-CM

## 2020-07-24 RX ORDER — ISOPROPYL ALCOHOL 0.75 G/1
SWAB TOPICAL
Qty: 100 PAD | Refills: 5 | Status: SHIPPED | OUTPATIENT
Start: 2020-07-24 | End: 2021-03-09 | Stop reason: SDUPTHER

## 2020-07-24 RX ORDER — LANCETS 33 GAUGE
EACH MISCELLANEOUS
Qty: 100 LANCET | Refills: 1 | Status: SHIPPED | OUTPATIENT
Start: 2020-07-24 | End: 2021-03-09 | Stop reason: SDUPTHER

## 2020-08-05 DIAGNOSIS — E11.9 TYPE 2 DIABETES MELLITUS WITHOUT COMPLICATION, WITHOUT LONG-TERM CURRENT USE OF INSULIN (HCC): ICD-10-CM

## 2020-08-05 RX ORDER — GLIMEPIRIDE 2 MG/1
TABLET ORAL
Qty: 90 TAB | Refills: 0 | Status: SHIPPED | OUTPATIENT
Start: 2020-08-05 | End: 2021-03-09 | Stop reason: SDUPTHER

## 2020-08-18 DIAGNOSIS — I10 ESSENTIAL HYPERTENSION, BENIGN: ICD-10-CM

## 2020-08-18 RX ORDER — FUROSEMIDE 20 MG/1
TABLET ORAL
Qty: 90 TAB | Refills: 0 | Status: SHIPPED | OUTPATIENT
Start: 2020-08-18 | End: 2020-12-04

## 2020-09-17 DIAGNOSIS — M50.30 DEGENERATIVE CERVICAL DISC: ICD-10-CM

## 2020-09-17 RX ORDER — CYCLOBENZAPRINE HCL 10 MG
TABLET ORAL
Qty: 270 TAB | Refills: 0 | Status: SHIPPED | OUTPATIENT
Start: 2020-09-17 | End: 2020-11-25

## 2020-10-15 DIAGNOSIS — E11.40 TYPE 2 DIABETES MELLITUS WITH DIABETIC NEUROPATHY, WITHOUT LONG-TERM CURRENT USE OF INSULIN (HCC): ICD-10-CM

## 2020-11-23 ENCOUNTER — OFFICE VISIT (OUTPATIENT)
Dept: FAMILY MEDICINE CLINIC | Age: 55
End: 2020-11-23
Payer: MEDICARE

## 2020-11-23 VITALS
WEIGHT: 166 LBS | TEMPERATURE: 97.4 F | BODY MASS INDEX: 24.59 KG/M2 | DIASTOLIC BLOOD PRESSURE: 68 MMHG | OXYGEN SATURATION: 98 % | HEART RATE: 74 BPM | RESPIRATION RATE: 17 BRPM | SYSTOLIC BLOOD PRESSURE: 115 MMHG | HEIGHT: 69 IN

## 2020-11-23 DIAGNOSIS — E11.40 TYPE 2 DIABETES MELLITUS WITH DIABETIC NEUROPATHY, WITHOUT LONG-TERM CURRENT USE OF INSULIN (HCC): ICD-10-CM

## 2020-11-23 DIAGNOSIS — E78.00 PURE HYPERCHOLESTEROLEMIA: ICD-10-CM

## 2020-11-23 DIAGNOSIS — Z00.00 ROUTINE GENERAL MEDICAL EXAMINATION AT A HEALTH CARE FACILITY: Primary | ICD-10-CM

## 2020-11-23 DIAGNOSIS — F25.0 SCHIZOAFFECTIVE DISORDER, BIPOLAR TYPE (HCC): ICD-10-CM

## 2020-11-23 DIAGNOSIS — I10 ESSENTIAL HYPERTENSION, BENIGN: ICD-10-CM

## 2020-11-23 PROCEDURE — 3017F COLORECTAL CA SCREEN DOC REV: CPT | Performed by: FAMILY MEDICINE

## 2020-11-23 PROCEDURE — G8754 DIAS BP LESS 90: HCPCS | Performed by: FAMILY MEDICINE

## 2020-11-23 PROCEDURE — G8752 SYS BP LESS 140: HCPCS | Performed by: FAMILY MEDICINE

## 2020-11-23 PROCEDURE — G8427 DOCREV CUR MEDS BY ELIG CLIN: HCPCS | Performed by: FAMILY MEDICINE

## 2020-11-23 PROCEDURE — 99214 OFFICE O/P EST MOD 30 MIN: CPT | Performed by: FAMILY MEDICINE

## 2020-11-23 PROCEDURE — G0439 PPPS, SUBSEQ VISIT: HCPCS | Performed by: FAMILY MEDICINE

## 2020-11-23 PROCEDURE — G8420 CALC BMI NORM PARAMETERS: HCPCS | Performed by: FAMILY MEDICINE

## 2020-11-23 PROCEDURE — 3046F HEMOGLOBIN A1C LEVEL >9.0%: CPT | Performed by: FAMILY MEDICINE

## 2020-11-23 PROCEDURE — 2022F DILAT RTA XM EVC RTNOPTHY: CPT | Performed by: FAMILY MEDICINE

## 2020-11-23 PROCEDURE — G8510 SCR DEP NEG, NO PLAN REQD: HCPCS | Performed by: FAMILY MEDICINE

## 2020-11-23 RX ORDER — GABAPENTIN 300 MG/1
CAPSULE ORAL
COMMUNITY
Start: 2020-11-18 | End: 2021-03-09 | Stop reason: SDUPTHER

## 2020-11-23 NOTE — PROGRESS NOTES
Chief Complaint   Patient presents with    Diabetes    Labs     Fasting today     1. Have you been to the ER, urgent care clinic since your last visit? Hospitalized since your last visit? No    2. Have you seen or consulted any other health care providers outside of the [address] [place] [address] since your last visit? Include any pap smears or colon screening. Yes Where: [facility]    Kory Fabian  11/23/2020  Provider:   Hola:  Diabetes Report Card   1) Have you seen the eye doctor in past year?no    2) How would you  rate your Diabetic Diet? Pretty good   3) How well do you take care of your feet? Self care   4) Do you keep your Primary Care Follow Up Appts? yes    5) Do you know your A1C goal?yes    6) Do you take your medications daily? yes    7) Do you check your blood sugars? yes    8) Have you gained weight?no       9) Do you follow an exercise program?no    10) Can you do better?yes      Lab Results   Component Value Date/Time    Cholesterol, total 123 09/04/2019 10:08 AM    HDL Cholesterol 35 (L) 09/04/2019 10:08 AM    LDL, calculated 58 09/04/2019 10:08 AM    Triglyceride 148 09/04/2019 10:08 AM     Lab Results   Component Value Date/Time    Hemoglobin A1c 6.9 (H) 09/04/2019 10:08 AM    Hemoglobin A1c 7.5 (H) 03/27/2019 12:03 PM    Hemoglobin A1c 6.8 (H) 12/04/2018 10:30 AM    Glucose 130 (H) 09/04/2019 10:08 AM    Glucose (POC) 193 (H) 09/02/2011 06:15 PM    Microalb/Creat ratio (ug/mg creat.) 18.7 09/04/2019 10:08 AM    LDL, calculated 58 09/04/2019 10:08 AM    Creatinine (POC) 0.7 01/04/2012 03:21 PM    Creatinine 1.22 09/04/2019 10:08 AM          Medicare Wellness Exam:    Chief Complaint   Patient presents with    Diabetes    Labs     Fasting today     he is a 60y.o. year old male who presents for evaluation for their Medicare Wellness Visit.     Fall Screen is completed and assessed=yes  Depression Screen is completed and assessed=yes  Medication list reviewed and adjusted for accuracy=yes  Immunizations reviewed and updated=yes  Health/Preventative Screenings reviewed and updated=yes  ADL Functions reviewed=yes    Patient Active Problem List    Diagnosis    [facility] with [facility] neuropathy ([facility]    Coronary artery disease due to lipid rich plaque    Pulmonary emphysema (HCC)    Type 2 diabetes mellitus without complication, without long-term current use of insulin (HCC)    Essential hypertension, benign    Right hand pain    Pain in both feet    Peripheral neuropathy    Idiopathic peripheral neuropathy    Chronic neck pain    Cervical spondylosis    Degenerative cervical disc    Hyperlipidemia    History of MI (myocardial infarction)    RSD (reflex sympathetic dystrophy)    [facility]anoia [facility]psychosis) [facility]    Delusions of persecution    Schizoaffective disorder ([facility]       Reviewed PmHx, RxHx, FmHx, SocHx, AllgHx and updated and dated in the chart. Review of Systems - negative except as listed above in the HPI    Objective:     Vitals:    11/23/20 1419   BP: 115/68   Pulse: 74   Resp: 17   Temp: 97.4 °F (36.3 °C)   TempSrc: Skin   SpO2: 98%   Weight: 166 lb (75.3 kg)   Height: 5' 9\" (1.753 m)     Physical Examination: General appearance - alert, well appearing, and in no distress  Chest - clear to auscultation, no wheezes, rales or rhonchi, symmetric air entry  Heart - normal rate, regular rhythm, normal S1, S2, no murmurs, rubs, clicks or gallops  Abdomen - soft, nontender, nondistended, no masses or organomegaly  Extremities - peripheral pulses normal, no pedal edema, no clubbing or cyanosis    Assessment/ Plan:   Diagnoses and all orders for this visit:    1. Routine general medical examination at a health care facility  -     LIPID PANEL; Future  -     METABOLIC PANEL, COMPREHENSIVE; Future  -     CBC WITH AUTOMATED DIFF; Future  -     TSH 3RD GENERATION; Future  -     HEMOGLOBIN A1C WITH EAG;  Future  -     MICROALBUMIN, UR, RAND W/ MICROALB/CREAT RATIO; Future    2. Type 2 diabetes mellitus with diabetic neuropathy, without long-term current use of insulin (HCC)  -     LIPID PANEL; Future  -     METABOLIC PANEL, COMPREHENSIVE; Future  -     CBC WITH AUTOMATED DIFF; Future  -     TSH 3RD GENERATION; Future  -     HEMOGLOBIN A1C WITH EAG; Future  -     MICROALBUMIN, UR, RAND W/ MICROALB/CREAT RATIO; Future  Lab Results   Component Value Date/Time    Hemoglobin A1c 6.9 (H) 09/04/2019 10:08 AM     -also seeing [facility] and not sure of what they are doing for  Pt       3. Essential hypertension, benign  -     LIPID PANEL; Future  -     METABOLIC PANEL, COMPREHENSIVE; Future  -at goal    4. Pure hypercholesterolemia  -     LIPID PANEL; Future  -     METABOLIC PANEL, COMPREHENSIVE; Future    5. Schizoaffective disorder, bipolar type ([facility]  -seeing psych         -Pain evaluation performed in office  -Cognitive Screen performed in office  -Depression Screen, Fall risks (by up and go test)  and ADL functionality were addressed  -Medication list updated and reviewed for any changes   -A comprehensive review of medical issues and a plan was formulated  -End of life planning was addressed with pt   -Health Screenings for preventions were addressed and a plan was formulated  -Shingles Vaccine was recommended  -Discussed with patient cancer risk factors and appropriate screenings for age  -Patient evaluated for colonoscopy and referred if needed per screeing criteria  -Labs from previous visits were discussed with patient   -Discussed with patient diet and exercise and formulated a plan as needed  -An Advanced care plan was developed with the patient.  -Alcohol screening performed and was negative    -  Follow-up and Dispositions    · Return in about 6 months (around 5/23/2021). I have discussed the diagnosis with the patient and the intended plan as seen in the above orders. The patient understands and agrees with the plan.  The patient has received an after-visit summary and questions were answered concerning future plans. Medication Side Effects and Warnings were discussed with patien  Patient Labs were reviewed and or requested  Patient Past Records were reviewed and or requested    There are no Patient Instructions on file for this visit.       Ronney Sicard, M.D.

## 2020-11-24 LAB
ALBUMIN SERPL-MCNC: 5.2 G/DL (ref 3.8–4.9)
ALBUMIN/CREAT UR: 10 MG/G CREAT (ref 0–29)
ALBUMIN/GLOB SERPL: 2.6 {RATIO} (ref 1.2–2.2)
ALP SERPL-CCNC: 127 IU/L (ref 39–117)
ALT SERPL-CCNC: 14 IU/L (ref 0–44)
AST SERPL-CCNC: 15 IU/L (ref 0–40)
BASOPHILS # BLD AUTO: 0 X10E3/UL (ref 0–0.2)
BASOPHILS NFR BLD AUTO: 0 %
BILIRUB SERPL-MCNC: <0.2 MG/DL (ref 0–1.2)
BUN SERPL-MCNC: 19 MG/DL (ref 6–24)
BUN/CREAT SERPL: 16 (ref 9–20)
CALCIUM SERPL-MCNC: 10 MG/DL (ref 8.7–10.2)
CHLORIDE SERPL-SCNC: 102 MMOL/L (ref 96–106)
CHOLEST SERPL-MCNC: 163 MG/DL (ref 100–199)
CO2 SERPL-SCNC: 22 MMOL/L (ref 20–29)
CREAT SERPL-MCNC: 1.18 MG/DL (ref 0.76–1.27)
CREAT UR-MCNC: 41.4 MG/DL
EOSINOPHIL # BLD AUTO: 0.1 X10E3/UL (ref 0–0.4)
EOSINOPHIL NFR BLD AUTO: 1 %
ERYTHROCYTE [DISTWIDTH] IN BLOOD BY AUTOMATED COUNT: 13.5 % (ref 11.6–15.4)
EST. AVERAGE GLUCOSE BLD GHB EST-MCNC: 163 MG/DL
GLOBULIN SER CALC-MCNC: 2 G/DL (ref 1.5–4.5)
GLUCOSE SERPL-MCNC: 181 MG/DL (ref 65–99)
HBA1C MFR BLD: 7.3 % (ref 4.8–5.6)
HCT VFR BLD AUTO: 40.2 % (ref 37.5–51)
HDLC SERPL-MCNC: 33 MG/DL
HGB BLD-MCNC: 14.4 G/DL (ref 13–17.7)
IMM GRANULOCYTES # BLD AUTO: 0 X10E3/UL (ref 0–0.1)
IMM GRANULOCYTES NFR BLD AUTO: 0 %
INTERPRETATION, 910389: NORMAL
LDLC SERPL CALC-MCNC: 89 MG/DL (ref 0–99)
LYMPHOCYTES # BLD AUTO: 0.9 X10E3/UL (ref 0.7–3.1)
LYMPHOCYTES NFR BLD AUTO: 10 %
Lab: NORMAL
MCH RBC QN AUTO: 33.8 PG (ref 26.6–33)
MCHC RBC AUTO-ENTMCNC: 35.8 G/DL (ref 31.5–35.7)
MCV RBC AUTO: 94 FL (ref 79–97)
MICROALBUMIN UR-MCNC: 4.1 UG/ML
MONOCYTES # BLD AUTO: 0.4 X10E3/UL (ref 0.1–0.9)
MONOCYTES NFR BLD AUTO: 4 %
NEUTROPHILS # BLD AUTO: 8.2 X10E3/UL (ref 1.4–7)
NEUTROPHILS NFR BLD AUTO: 85 %
PLATELET # BLD AUTO: 234 X10E3/UL (ref 150–450)
POTASSIUM SERPL-SCNC: 4.8 MMOL/L (ref 3.5–5.2)
PROT SERPL-MCNC: 7.2 G/DL (ref 6–8.5)
RBC # BLD AUTO: 4.26 X10E6/UL (ref 4.14–5.8)
SODIUM SERPL-SCNC: 139 MMOL/L (ref 134–144)
TRIGL SERPL-MCNC: 240 MG/DL (ref 0–149)
TSH SERPL DL<=0.005 MIU/L-ACNC: 1.15 UIU/ML (ref 0.45–4.5)
VLDLC SERPL CALC-MCNC: 41 MG/DL (ref 5–40)
WBC # BLD AUTO: 9.7 X10E3/UL (ref 3.4–10.8)

## 2020-11-24 NOTE — PROGRESS NOTES
A1c is slightly above goal of less than 7. Discussed patient dietary changes and recheck in 3 months.     Dr. Preethi Silva

## 2020-11-25 DIAGNOSIS — M50.30 DEGENERATIVE CERVICAL DISC: ICD-10-CM

## 2020-11-25 RX ORDER — CYCLOBENZAPRINE HCL 10 MG
TABLET ORAL
Qty: 270 TAB | Refills: 0 | Status: SHIPPED | OUTPATIENT
Start: 2020-11-25 | End: 2021-03-01

## 2020-11-30 ENCOUNTER — TELEPHONE (OUTPATIENT)
Dept: FAMILY MEDICINE CLINIC | Age: 55
End: 2020-11-30

## 2020-11-30 NOTE — TELEPHONE ENCOUNTER
Patient returned call because call was disconnected due to our phone issue. I also got disconnected while talking.  Called patient back & said that nurse will call him back with his lab results @[phone]

## 2020-12-04 DIAGNOSIS — I10 ESSENTIAL HYPERTENSION, BENIGN: ICD-10-CM

## 2020-12-04 RX ORDER — FUROSEMIDE 20 MG/1
TABLET ORAL
Qty: 90 TAB | Refills: 0 | Status: SHIPPED | OUTPATIENT
Start: 2020-12-04 | End: 2021-03-01

## 2020-12-08 DIAGNOSIS — E11.40 TYPE 2 DIABETES MELLITUS WITH DIABETIC NEUROPATHY, WITHOUT LONG-TERM CURRENT USE OF INSULIN (HCC): ICD-10-CM

## 2021-01-21 DIAGNOSIS — E11.40 TYPE 2 DIABETES MELLITUS WITH DIABETIC NEUROPATHY, WITHOUT LONG-TERM CURRENT USE OF INSULIN (HCC): ICD-10-CM

## 2021-02-27 DIAGNOSIS — I10 ESSENTIAL HYPERTENSION, BENIGN: ICD-10-CM

## 2021-02-27 DIAGNOSIS — M50.30 DEGENERATIVE CERVICAL DISC: ICD-10-CM

## 2021-02-28 DIAGNOSIS — I10 ESSENTIAL HYPERTENSION, BENIGN: ICD-10-CM

## 2021-03-01 RX ORDER — IBUPROFEN 800 MG/1
TABLET ORAL
Qty: 270 TAB | Refills: 0 | Status: SHIPPED | OUTPATIENT
Start: 2021-03-01 | End: 2021-03-09 | Stop reason: ALTCHOICE

## 2021-03-01 RX ORDER — POTASSIUM CHLORIDE 1500 MG/1
TABLET, FILM COATED, EXTENDED RELEASE ORAL
Qty: 90 TAB | Refills: 0 | Status: SHIPPED | OUTPATIENT
Start: 2021-03-01 | End: 2021-03-09 | Stop reason: SDUPTHER

## 2021-03-01 RX ORDER — CYCLOBENZAPRINE HCL 10 MG
TABLET ORAL
Qty: 270 TAB | Refills: 0 | Status: SHIPPED | OUTPATIENT
Start: 2021-03-01 | End: 2021-05-24

## 2021-03-01 RX ORDER — FUROSEMIDE 20 MG/1
TABLET ORAL
Qty: 90 TAB | Refills: 0 | Status: SHIPPED | OUTPATIENT
Start: 2021-03-01 | End: 2021-03-09 | Stop reason: SDUPTHER

## 2021-03-09 ENCOUNTER — OFFICE VISIT (OUTPATIENT)
Dept: FAMILY MEDICINE CLINIC | Age: 56
End: 2021-03-09
Payer: MEDICARE

## 2021-03-09 VITALS
HEART RATE: 88 BPM | SYSTOLIC BLOOD PRESSURE: 130 MMHG | OXYGEN SATURATION: 95 % | RESPIRATION RATE: 16 BRPM | WEIGHT: 162 LBS | HEIGHT: 69 IN | TEMPERATURE: 97.7 F | BODY MASS INDEX: 23.99 KG/M2 | DIASTOLIC BLOOD PRESSURE: 72 MMHG

## 2021-03-09 DIAGNOSIS — I69.359 CVA, OLD, HEMIPARESIS (HCC): Primary | ICD-10-CM

## 2021-03-09 DIAGNOSIS — E11.9 TYPE 2 DIABETES MELLITUS WITHOUT COMPLICATION, WITHOUT LONG-TERM CURRENT USE OF INSULIN (HCC): ICD-10-CM

## 2021-03-09 DIAGNOSIS — F25.0 SCHIZOAFFECTIVE DISORDER, BIPOLAR TYPE (HCC): ICD-10-CM

## 2021-03-09 DIAGNOSIS — E11.40 TYPE 2 DIABETES MELLITUS WITH DIABETIC NEUROPATHY, WITHOUT LONG-TERM CURRENT USE OF INSULIN (HCC): ICD-10-CM

## 2021-03-09 DIAGNOSIS — I10 ESSENTIAL HYPERTENSION, BENIGN: ICD-10-CM

## 2021-03-09 DIAGNOSIS — J43.9 PULMONARY EMPHYSEMA, UNSPECIFIED EMPHYSEMA TYPE (HCC): ICD-10-CM

## 2021-03-09 DIAGNOSIS — I25.83 CORONARY ARTERY DISEASE DUE TO LIPID RICH PLAQUE: ICD-10-CM

## 2021-03-09 DIAGNOSIS — I25.10 CORONARY ARTERY DISEASE DUE TO LIPID RICH PLAQUE: ICD-10-CM

## 2021-03-09 DIAGNOSIS — E78.00 PURE HYPERCHOLESTEROLEMIA: ICD-10-CM

## 2021-03-09 PROCEDURE — G8420 CALC BMI NORM PARAMETERS: HCPCS | Performed by: FAMILY MEDICINE

## 2021-03-09 PROCEDURE — 3046F HEMOGLOBIN A1C LEVEL >9.0%: CPT | Performed by: FAMILY MEDICINE

## 2021-03-09 PROCEDURE — G8754 DIAS BP LESS 90: HCPCS | Performed by: FAMILY MEDICINE

## 2021-03-09 PROCEDURE — G8752 SYS BP LESS 140: HCPCS | Performed by: FAMILY MEDICINE

## 2021-03-09 PROCEDURE — 99214 OFFICE O/P EST MOD 30 MIN: CPT | Performed by: FAMILY MEDICINE

## 2021-03-09 PROCEDURE — G8510 SCR DEP NEG, NO PLAN REQD: HCPCS | Performed by: FAMILY MEDICINE

## 2021-03-09 PROCEDURE — 2022F DILAT RTA XM EVC RTNOPTHY: CPT | Performed by: FAMILY MEDICINE

## 2021-03-09 PROCEDURE — G8427 DOCREV CUR MEDS BY ELIG CLIN: HCPCS | Performed by: FAMILY MEDICINE

## 2021-03-09 PROCEDURE — 3017F COLORECTAL CA SCREEN DOC REV: CPT | Performed by: FAMILY MEDICINE

## 2021-03-09 RX ORDER — GABAPENTIN 300 MG/1
CAPSULE ORAL
Qty: 90 CAP | Refills: 1 | Status: SHIPPED | OUTPATIENT
Start: 2021-03-09 | End: 2021-10-18

## 2021-03-09 RX ORDER — FUROSEMIDE 20 MG/1
TABLET ORAL
Qty: 90 TAB | Refills: 2 | Status: SHIPPED | OUTPATIENT
Start: 2021-03-09 | End: 2021-10-21

## 2021-03-09 RX ORDER — LANCETS 33 GAUGE
EACH MISCELLANEOUS
Qty: 100 LANCET | Refills: 1 | Status: SHIPPED | OUTPATIENT
Start: 2021-03-09 | End: 2021-04-27 | Stop reason: SDUPTHER

## 2021-03-09 RX ORDER — CLOPIDOGREL BISULFATE 75 MG/1
1 TABLET ORAL DAILY
COMMUNITY
Start: 2021-02-28 | End: 2021-03-09 | Stop reason: SDUPTHER

## 2021-03-09 RX ORDER — CLOPIDOGREL BISULFATE 75 MG/1
75 TABLET ORAL DAILY
Qty: 90 TAB | Refills: 3 | Status: SHIPPED | OUTPATIENT
Start: 2021-03-09 | End: 2022-03-22

## 2021-03-09 RX ORDER — INSULIN PUMP SYRINGE, 3 ML
EACH MISCELLANEOUS
Qty: 1 KIT | Refills: 0 | Status: SHIPPED | OUTPATIENT
Start: 2021-03-09

## 2021-03-09 RX ORDER — POTASSIUM CHLORIDE 1500 MG/1
TABLET, FILM COATED, EXTENDED RELEASE ORAL
Qty: 90 TAB | Refills: 3 | Status: SHIPPED | OUTPATIENT
Start: 2021-03-09 | End: 2022-05-19

## 2021-03-09 RX ORDER — GLIMEPIRIDE 2 MG/1
2 TABLET ORAL 2 TIMES DAILY
Qty: 180 TAB | Refills: 1 | Status: SHIPPED | OUTPATIENT
Start: 2021-03-09 | End: 2022-02-25

## 2021-03-09 RX ORDER — METFORMIN HYDROCHLORIDE 500 MG/1
TABLET, EXTENDED RELEASE ORAL
Qty: 180 TAB | Refills: 1 | Status: SHIPPED | OUTPATIENT
Start: 2021-03-09 | End: 2022-05-31 | Stop reason: SDUPTHER

## 2021-03-09 RX ORDER — ISOPROPYL ALCOHOL 70 ML/100ML
SWAB TOPICAL
Qty: 100 PAD | Refills: 5 | Status: SHIPPED | OUTPATIENT
Start: 2021-03-09 | End: 2022-04-06

## 2021-03-09 RX ORDER — NITROGLYCERIN 0.4 MG/1
0.4 TABLET SUBLINGUAL
Qty: 25 TAB | Refills: 3 | Status: SHIPPED | OUTPATIENT
Start: 2021-03-09 | End: 2022-05-16 | Stop reason: SDUPTHER
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
## 2021-03-11 LAB
ALBUMIN SERPL-MCNC: 4.7 G/DL (ref 3.8–4.9)
ALBUMIN/GLOB SERPL: 2 {RATIO} (ref 1.2–2.2)
ALP SERPL-CCNC: 106 IU/L (ref 39–117)
ALT SERPL-CCNC: 20 IU/L (ref 0–44)
AST SERPL-CCNC: 18 IU/L (ref 0–40)
BILIRUB SERPL-MCNC: <0.2 MG/DL (ref 0–1.2)
BUN SERPL-MCNC: 17 MG/DL (ref 6–24)
BUN/CREAT SERPL: 14 (ref 9–20)
CALCIUM SERPL-MCNC: 10.2 MG/DL (ref 8.7–10.2)
CHLORIDE SERPL-SCNC: 102 MMOL/L (ref 96–106)
CHOLEST SERPL-MCNC: 153 MG/DL (ref 100–199)
CO2 SERPL-SCNC: 23 MMOL/L (ref 20–29)
CREAT SERPL-MCNC: 1.23 MG/DL (ref 0.76–1.27)
EST. AVERAGE GLUCOSE BLD GHB EST-MCNC: 180 MG/DL
GLOBULIN SER CALC-MCNC: 2.3 G/DL (ref 1.5–4.5)
GLUCOSE SERPL-MCNC: 195 MG/DL (ref 65–99)
HBA1C MFR BLD: 7.9 % (ref 4.8–5.6)
HDLC SERPL-MCNC: 31 MG/DL
IMP & REVIEW OF LAB RESULTS: NORMAL
LDLC SERPL CALC-MCNC: 88 MG/DL (ref 0–99)
Lab: NORMAL
POTASSIUM SERPL-SCNC: 5.1 MMOL/L (ref 3.5–5.2)
PROT SERPL-MCNC: 7 G/DL (ref 6–8.5)
SODIUM SERPL-SCNC: 143 MMOL/L (ref 134–144)
TRIGL SERPL-MCNC: 199 MG/DL (ref 0–149)
VLDLC SERPL CALC-MCNC: 34 MG/DL (ref 5–40)

## 2021-03-15 RX ORDER — EPINEPHRINE 0.3 MG/.3ML
0.3 INJECTION SUBCUTANEOUS
COMMUNITY
End: 2021-03-15 | Stop reason: SDUPTHER

## 2021-03-15 RX ORDER — EPINEPHRINE 0.3 MG/.3ML
0.3 INJECTION SUBCUTANEOUS
Qty: 1 SYRINGE | Refills: 0 | Status: SHIPPED | OUTPATIENT
Start: 2021-03-15 | End: 2021-03-15

## 2021-04-27 DIAGNOSIS — E11.9 TYPE 2 DIABETES MELLITUS WITHOUT COMPLICATION, WITHOUT LONG-TERM CURRENT USE OF INSULIN (HCC): ICD-10-CM

## 2021-04-27 RX ORDER — LANCETS 33 GAUGE
EACH MISCELLANEOUS
Qty: 100 LANCET | Refills: 2 | Status: SHIPPED | OUTPATIENT
Start: 2021-04-27 | End: 2022-05-02

## 2021-05-24 DIAGNOSIS — M50.30 DEGENERATIVE CERVICAL DISC: ICD-10-CM

## 2021-05-24 RX ORDER — CYCLOBENZAPRINE HCL 10 MG
TABLET ORAL
Qty: 270 TABLET | Refills: 0 | Status: SHIPPED | OUTPATIENT
Start: 2021-05-24 | End: 2021-08-30

## 2021-08-24 ENCOUNTER — OFFICE VISIT (OUTPATIENT)
Dept: FAMILY MEDICINE CLINIC | Age: 56
End: 2021-08-24
Payer: MEDICARE

## 2021-08-24 VITALS
WEIGHT: 158 LBS | SYSTOLIC BLOOD PRESSURE: 125 MMHG | HEART RATE: 71 BPM | DIASTOLIC BLOOD PRESSURE: 71 MMHG | OXYGEN SATURATION: 96 % | RESPIRATION RATE: 14 BRPM | BODY MASS INDEX: 23.4 KG/M2 | HEIGHT: 69 IN | TEMPERATURE: 97.6 F

## 2021-08-24 DIAGNOSIS — E78.00 PURE HYPERCHOLESTEROLEMIA: ICD-10-CM

## 2021-08-24 DIAGNOSIS — E11.40 TYPE 2 DIABETES MELLITUS WITH DIABETIC NEUROPATHY, WITHOUT LONG-TERM CURRENT USE OF INSULIN (HCC): Primary | ICD-10-CM

## 2021-08-24 DIAGNOSIS — I10 ESSENTIAL HYPERTENSION, BENIGN: ICD-10-CM

## 2021-08-24 PROCEDURE — G8427 DOCREV CUR MEDS BY ELIG CLIN: HCPCS | Performed by: FAMILY MEDICINE

## 2021-08-24 PROCEDURE — 3017F COLORECTAL CA SCREEN DOC REV: CPT | Performed by: FAMILY MEDICINE

## 2021-08-24 PROCEDURE — G8754 DIAS BP LESS 90: HCPCS | Performed by: FAMILY MEDICINE

## 2021-08-24 PROCEDURE — G8752 SYS BP LESS 140: HCPCS | Performed by: FAMILY MEDICINE

## 2021-08-24 PROCEDURE — 2022F DILAT RTA XM EVC RTNOPTHY: CPT | Performed by: FAMILY MEDICINE

## 2021-08-24 PROCEDURE — G8420 CALC BMI NORM PARAMETERS: HCPCS | Performed by: FAMILY MEDICINE

## 2021-08-24 PROCEDURE — 3051F HG A1C>EQUAL 7.0%<8.0%: CPT | Performed by: FAMILY MEDICINE

## 2021-08-24 PROCEDURE — G8432 DEP SCR NOT DOC, RNG: HCPCS | Performed by: FAMILY MEDICINE

## 2021-08-24 PROCEDURE — 99214 OFFICE O/P EST MOD 30 MIN: CPT | Performed by: FAMILY MEDICINE

## 2021-08-24 NOTE — PROGRESS NOTES
Chief Complaint   Patient presents with    Diabetes     Physical    Labs     Fasting today    Medication Refill     1. Have you been to the ER, urgent care clinic since your last visit? Hospitalized since your last visit? No    2. Have you seen or consulted any other health care providers outside of the [address] [place] [address] since your last visit? Include any pap smears or colon screening. Suzi     Rylee López  8/24/2021  Provider:   Hola:  Diabetes Report Card   1) Have you seen the eye doctor in past year?no    2) How would you  rate your Diabetic Diet? Good   3) How well do you take care of your feet? Neuropathy: Self care   4) Do you keep your Primary Care Follow Up Appts? yes    5) Do you know your A1C goal?yes    6) Do you take your medications daily? yes    7) Do you check your blood sugars? yes    8) Have you gained weight?no       9) Do you follow an exercise program?no    10) Can you do better?no      Lab Results   Component Value Date/Time    Cholesterol, total 153 03/09/2021 12:00 AM    HDL Cholesterol 31 (L) 03/09/2021 12:00 AM    LDL, calculated 88 03/09/2021 12:00 AM    LDL, calculated 58 09/04/2019 10:08 AM    Triglyceride 199 (H) 03/09/2021 12:00 AM     Lab Results   Component Value Date/Time    Hemoglobin A1c 7.9 (H) 03/09/2021 12:00 AM    Hemoglobin A1c 7.3 (H) 11/23/2020 12:00 AM    Hemoglobin A1c 6.9 (H) 09/04/2019 10:08 AM    Glucose 195 (H) 03/09/2021 12:00 AM    Glucose (POC) 193 (H) 09/02/2011 06:15 PM    Microalb/Creat ratio (ug/mg creat.) 10 11/23/2020 12:00 AM    LDL, calculated 88 03/09/2021 12:00 AM    LDL, calculated 58 09/04/2019 10:08 AM    Creatinine (POC) 0.7 01/04/2012 03:21 PM    Creatinine 1.23 03/09/2021 12:00 AM          Lab Results   Component Value Date/Time    Microalb/Creat ratio (ug/mg creat.) 10 11/23/2020 12:00 AM      Chief Complaint   Patient presents with    Diabetes     Physical    Labs     Fasting today    Medication Refill     he is a 60 y.o. year old male who presents for evaluation. See Diabetic Report Card listed above. Patient Active Problem List    Diagnosis    CVA, old, hemiparesis [facility]    Type 2 diabetes mellitus with diabetic neuropathy ([facility]    Coronary artery disease due to lipid rich plaque    Pulmonary emphysema (HCC)    Type 2 diabetes mellitus without complication, without long-term current use of insulin (HCC)    Essential hypertension, benign    Right hand pain    Pain in both feet    Peripheral neuropathy    Idiopathic peripheral neuropathy    Chronic neck pain    Cervical spondylosis    Degenerative cervical disc    Hyperlipidemia    History of MI (myocardial infarction)    RSD (reflex sympathetic dystrophy)    Paranoia (psychosis) ([facility]    Delusions of persecution    [facility] ([facility]       Reviewed PmHx, RxHx, FmHx, SocHx, AllgHx--dated and updated in the chart. Review of Systems - negative except as listed above in the HPI    Objective:     Vitals:    08/24/21 0847   BP: 125/71   Pulse: 71   Resp: 14   Temp: 97.6 °F (36.4 °C)   TempSrc: Oral   SpO2: 96%   Weight: 158 lb (71.7 kg)   Height: 5' 9\" (1.753 m)         Assessment/ Plan:   Diagnoses and all orders for this visit:    1. Type 2 diabetes mellitus with diabetic neuropathy, without long-term current use of insulin (HCC)  -     LIPID PANEL; Future  -     METABOLIC PANEL, COMPREHENSIVE; Future  -     HEMOGLOBIN A1C WITH EAG; Future  -not at goal,  Pt has made changes, needs to  See ophto  -refer for scope    2. Pure hypercholesterolemia  -     LIPID PANEL; Future  -     METABOLIC PANEL, COMPREHENSIVE; Future    3. Essential hypertension, benign  -     LIPID PANEL; Future  -     METABOLIC PANEL, COMPREHENSIVE; Future  -at goal       Follow-up and Dispositions    · Return in about 6 months (around 2/24/2022).        Lab Results   Component Value Date/Time    Cholesterol, total 153 03/09/2021 12:00 AM    HDL Cholesterol 31 (L) 03/09/2021 12:00 AM LDL, calculated 88 03/09/2021 12:00 AM    LDL, calculated 58 09/04/2019 10:08 AM    Triglyceride 199 (H) 03/09/2021 12:00 AM     Lab Results   Component Value Date/Time    Hemoglobin A1c 7.9 (H) 03/09/2021 12:00 AM    Hemoglobin A1c 7.3 (H) 11/23/2020 12:00 AM    Hemoglobin A1c 6.9 (H) 09/04/2019 10:08 AM    Microalb/Creat ratio (ug/mg creat.) 10 11/23/2020 12:00 AM    LDL, calculated 88 03/09/2021 12:00 AM    LDL, calculated 58 09/04/2019 10:08 AM    Creatinine (POC) 0.7 01/04/2012 03:21 PM    Creatinine 1.23 03/09/2021 12:00 AM          Discussed with patient goal of Diabetes to include:  HgA1C <7, LDL cholesterol <100, Blood pressure <140/80. Discussed with patient diet and weight management and to get regular exercise. Recommend yearly eye exams and daily foot care. The patient understands and agrees with the plan. I have discussed the diagnosis with the patient and the intended plan as seen in the above orders. The patient has received an after-visit summary and questions were answered concerning future plans. Medication Side Effects and Warnings were discussed with patient  Patient Labs were reviewed and or requested  Patient Past Records were reviewed and or requested    Sujatha Centeno M.D. [facility]    There are no Patient Instructions on file for this visit.

## 2021-08-25 LAB
ALBUMIN SERPL-MCNC: 4.7 G/DL (ref 3.8–4.9)
ALBUMIN/GLOB SERPL: 2.1 {RATIO} (ref 1.2–2.2)
ALP SERPL-CCNC: 120 IU/L (ref 48–121)
ALT SERPL-CCNC: 14 IU/L (ref 0–44)
AST SERPL-CCNC: 13 IU/L (ref 0–40)
BILIRUB SERPL-MCNC: <0.2 MG/DL (ref 0–1.2)
BUN SERPL-MCNC: 15 MG/DL (ref 6–24)
BUN/CREAT SERPL: 15 (ref 9–20)
CALCIUM SERPL-MCNC: 9.3 MG/DL (ref 8.7–10.2)
CHLORIDE SERPL-SCNC: 102 MMOL/L (ref 96–106)
CHOLEST SERPL-MCNC: 145 MG/DL (ref 100–199)
CO2 SERPL-SCNC: 21 MMOL/L (ref 20–29)
CREAT SERPL-MCNC: 0.98 MG/DL (ref 0.76–1.27)
EST. AVERAGE GLUCOSE BLD GHB EST-MCNC: 154 MG/DL
GLOBULIN SER CALC-MCNC: 2.2 G/DL (ref 1.5–4.5)
GLUCOSE SERPL-MCNC: 140 MG/DL (ref 65–99)
HBA1C MFR BLD: 7 % (ref 4.8–5.6)
HDLC SERPL-MCNC: 31 MG/DL
IMP & REVIEW OF LAB RESULTS: NORMAL
LDLC SERPL CALC-MCNC: 88 MG/DL (ref 0–99)
POTASSIUM SERPL-SCNC: 4.5 MMOL/L (ref 3.5–5.2)
PROT SERPL-MCNC: 6.9 G/DL (ref 6–8.5)
SODIUM SERPL-SCNC: 140 MMOL/L (ref 134–144)
TRIGL SERPL-MCNC: 145 MG/DL (ref 0–149)
VLDLC SERPL CALC-MCNC: 26 MG/DL (ref 5–40)

## 2021-08-25 NOTE — PROGRESS NOTES
Thank you for your visit,  and I hope that we met your expectations! Let us hope 2021 is a great year for you! For 2021 and beyond we are offering Virtual appointments for you, giving you more convenient access to your provider. ..just ask the  when you call our office for your next appointment. We also are offering E-Visits. You can find the link for an E-Visit in your [facility] li and this is a visit thru messaging and attached to your medical record. If you have a simple concern you can click on this link and answer few questions, by the end of the day your concerns will have been addressed. After reviewing your labs, they are within normal limits for your age. Keep working hard on diet and taking your medications that are prescribed. If you have any acute care needs and are having trouble getting an appointment. .. please send me a   [facility] message or have the  send me a message by calling [phone]. Have a blessed day and don't forget to be kind  to others! Jatin Maciel M.D.   Good Help to Those in Need  \"You may be whatever you resolve to be\"

## 2021-08-29 DIAGNOSIS — M50.30 DEGENERATIVE CERVICAL DISC: ICD-10-CM

## 2021-08-30 RX ORDER — CYCLOBENZAPRINE HCL 10 MG
TABLET ORAL
Qty: 270 TABLET | Refills: 0 | Status: SHIPPED | OUTPATIENT
Start: 2021-08-30 | End: 2021-09-07

## 2021-09-03 DIAGNOSIS — M50.30 DEGENERATIVE CERVICAL DISC: ICD-10-CM

## 2021-09-07 RX ORDER — CYCLOBENZAPRINE HCL 10 MG
TABLET ORAL
Qty: 270 TABLET | Refills: 0 | Status: SHIPPED | OUTPATIENT
Start: 2021-09-07 | End: 2022-02-28

## 2021-09-27 ENCOUNTER — DOCUMENTATION ONLY (OUTPATIENT)
Dept: FAMILY MEDICINE CLINIC | Age: 56
End: 2021-09-27

## 2021-09-27 NOTE — PROGRESS NOTES
[address] medical records request was faxed to [employer] at [phone] to be processed on 09/24/2021.

## 2021-10-20 DIAGNOSIS — I10 ESSENTIAL HYPERTENSION, BENIGN: ICD-10-CM

## 2021-10-21 RX ORDER — FUROSEMIDE 20 MG/1
TABLET ORAL
Qty: 90 TABLET | Refills: 0 | Status: SHIPPED | OUTPATIENT
Start: 2021-10-21 | End: 2022-03-22

## 2022-01-11 DIAGNOSIS — E11.40 TYPE 2 DIABETES MELLITUS WITH DIABETIC NEUROPATHY, WITHOUT LONG-TERM CURRENT USE OF INSULIN (HCC): ICD-10-CM

## 2022-01-11 RX ORDER — SITAGLIPTIN 100 MG/1
TABLET, FILM COATED ORAL
Qty: 90 TABLET | Refills: 0 | Status: SHIPPED | OUTPATIENT
Start: 2022-01-11 | End: 2022-04-25

## 2022-03-19 PROBLEM — E11.40 TYPE 2 DIABETES MELLITUS WITH DIABETIC NEUROPATHY (HCC): Status: ACTIVE | Noted: 2019-09-04

## 2022-03-19 PROBLEM — I25.83 CORONARY ARTERY DISEASE DUE TO LIPID RICH PLAQUE: Status: ACTIVE | Noted: 2018-08-23

## 2022-03-19 PROBLEM — J43.9 PULMONARY EMPHYSEMA (HCC): Status: ACTIVE | Noted: 2018-08-23

## 2022-03-19 PROBLEM — I25.10 CORONARY ARTERY DISEASE DUE TO LIPID RICH PLAQUE: Status: ACTIVE | Noted: 2018-08-23

## 2022-03-20 PROBLEM — I69.359 CVA, OLD, HEMIPARESIS (HCC): Status: ACTIVE | Noted: 2021-03-09

## 2022-03-20 PROBLEM — E11.9 TYPE 2 DIABETES MELLITUS WITHOUT COMPLICATION, WITHOUT LONG-TERM CURRENT USE OF INSULIN (HCC): Status: ACTIVE | Noted: 2017-02-02

## 2022-03-22 DIAGNOSIS — I10 ESSENTIAL HYPERTENSION, BENIGN: ICD-10-CM

## 2022-03-22 RX ORDER — CLOPIDOGREL BISULFATE 75 MG/1
TABLET ORAL
Qty: 90 TABLET | Refills: 0 | Status: SHIPPED | OUTPATIENT
Start: 2022-03-22 | End: 2022-09-13

## 2022-03-22 RX ORDER — FUROSEMIDE 20 MG/1
TABLET ORAL
Qty: 90 TABLET | Refills: 0 | Status: SHIPPED | OUTPATIENT
Start: 2022-03-22 | End: 2022-09-26

## 2022-04-04 NOTE — PROGRESS NOTES
Patient here for cough, congestion , body aches x 2 weeks. He came in last week and just finished a course of antibiotics. Still not better. 1. Have you been to the ER, urgent care clinic since your last visit? Hospitalized since your last visit? No    2. Have you seen or consulted any other health care providers outside of the [address] [place] [address] since your last visit? Include any pap smears or colon screening.  No Billing Type: Third-Party Bill

## 2022-04-06 RX ORDER — ISOPROPYL ALCOHOL 70 ML/100ML
SWAB TOPICAL
Qty: 100 PAD | Refills: 0 | Status: SHIPPED | OUTPATIENT
Start: 2022-04-06

## 2022-04-25 DIAGNOSIS — E11.40 TYPE 2 DIABETES MELLITUS WITH DIABETIC NEUROPATHY, WITHOUT LONG-TERM CURRENT USE OF INSULIN (HCC): ICD-10-CM

## 2022-04-25 RX ORDER — SITAGLIPTIN 100 MG/1
TABLET, FILM COATED ORAL
Qty: 90 TABLET | Refills: 0 | Status: SHIPPED | OUTPATIENT
Start: 2022-04-25 | End: 2022-09-01

## 2022-04-28 DIAGNOSIS — E11.9 TYPE 2 DIABETES MELLITUS WITHOUT COMPLICATION, WITHOUT LONG-TERM CURRENT USE OF INSULIN (HCC): ICD-10-CM

## 2022-05-02 RX ORDER — BLOOD SUGAR DIAGNOSTIC
STRIP MISCELLANEOUS
Qty: 100 STRIP | Refills: 0 | Status: SHIPPED | OUTPATIENT
Start: 2022-05-02

## 2022-05-02 RX ORDER — LANCETS 33 GAUGE
EACH MISCELLANEOUS
Qty: 100 EACH | Refills: 0 | Status: SHIPPED | OUTPATIENT
Start: 2022-05-02

## 2022-05-09 DIAGNOSIS — M50.30 DEGENERATIVE CERVICAL DISC: ICD-10-CM

## 2022-05-09 RX ORDER — CYCLOBENZAPRINE HCL 10 MG
TABLET ORAL
Qty: 90 TABLET | Refills: 0 | Status: SHIPPED | OUTPATIENT
Start: 2022-05-09 | End: 2022-06-06

## 2022-05-16 ENCOUNTER — OFFICE VISIT (OUTPATIENT)
Dept: FAMILY MEDICINE CLINIC | Age: 57
End: 2022-05-16
Payer: MEDICARE

## 2022-05-16 VITALS
SYSTOLIC BLOOD PRESSURE: 122 MMHG | HEART RATE: 75 BPM | RESPIRATION RATE: 75 BRPM | TEMPERATURE: 97.6 F | DIASTOLIC BLOOD PRESSURE: 70 MMHG | HEIGHT: 69 IN | WEIGHT: 160 LBS | BODY MASS INDEX: 23.7 KG/M2 | OXYGEN SATURATION: 96 %

## 2022-05-16 DIAGNOSIS — E78.00 PURE HYPERCHOLESTEROLEMIA: ICD-10-CM

## 2022-05-16 DIAGNOSIS — I69.359 CVA, OLD, HEMIPARESIS (HCC): ICD-10-CM

## 2022-05-16 DIAGNOSIS — J43.9 PULMONARY EMPHYSEMA, UNSPECIFIED EMPHYSEMA TYPE (HCC): ICD-10-CM

## 2022-05-16 DIAGNOSIS — I25.83 CORONARY ARTERY DISEASE DUE TO LIPID RICH PLAQUE: ICD-10-CM

## 2022-05-16 DIAGNOSIS — I25.10 CORONARY ARTERY DISEASE DUE TO LIPID RICH PLAQUE: ICD-10-CM

## 2022-05-16 DIAGNOSIS — E11.40 TYPE 2 DIABETES MELLITUS WITH DIABETIC NEUROPATHY, WITHOUT LONG-TERM CURRENT USE OF INSULIN (HCC): Primary | ICD-10-CM

## 2022-05-16 DIAGNOSIS — I10 ESSENTIAL HYPERTENSION, BENIGN: ICD-10-CM

## 2022-05-16 DIAGNOSIS — F25.0 SCHIZOAFFECTIVE DISORDER, BIPOLAR TYPE (HCC): ICD-10-CM

## 2022-05-16 PROCEDURE — G8420 CALC BMI NORM PARAMETERS: HCPCS | Performed by: FAMILY MEDICINE

## 2022-05-16 PROCEDURE — G8427 DOCREV CUR MEDS BY ELIG CLIN: HCPCS | Performed by: FAMILY MEDICINE

## 2022-05-16 PROCEDURE — 2022F DILAT RTA XM EVC RTNOPTHY: CPT | Performed by: FAMILY MEDICINE

## 2022-05-16 PROCEDURE — 99214 OFFICE O/P EST MOD 30 MIN: CPT | Performed by: FAMILY MEDICINE

## 2022-05-16 PROCEDURE — 3046F HEMOGLOBIN A1C LEVEL >9.0%: CPT | Performed by: FAMILY MEDICINE

## 2022-05-16 PROCEDURE — G8754 DIAS BP LESS 90: HCPCS | Performed by: FAMILY MEDICINE

## 2022-05-16 PROCEDURE — G8752 SYS BP LESS 140: HCPCS | Performed by: FAMILY MEDICINE

## 2022-05-16 PROCEDURE — 3017F COLORECTAL CA SCREEN DOC REV: CPT | Performed by: FAMILY MEDICINE

## 2022-05-16 PROCEDURE — G8510 SCR DEP NEG, NO PLAN REQD: HCPCS | Performed by: FAMILY MEDICINE

## 2022-05-16 RX ORDER — NITROGLYCERIN 0.4 MG/1
0.4 TABLET SUBLINGUAL
Qty: 25 TABLET | Refills: 3 | Status: SHIPPED | OUTPATIENT
Start: 2022-05-16

## 2022-05-16 NOTE — PROGRESS NOTES
Chief Complaint   Patient presents with    Diabetes    Hypertension    Labs     Fasting today    Medication Refill     1. Have you been to the ER, urgent care clinic since your last visit? Hospitalized since your last visit? No    2. Have you seen or consulted any other health care providers outside of the [address] [place] [address] since your last visit? Include any pap smears or colon screening. No       Diana MARTE Caren  5/16/2022  Provider:   Hola:  Diabetes Report Card   1) Have you seen the eye doctor in past year?no    2) How would you  rate your Diabetic Diet? Fair   3) How well do you take care of your feet? Self care   4) Do you keep your Primary Care Follow Up Appts? yes    5) Do you know your A1C goal?yes    6) Do you take your medications daily? yes    7) Do you check your blood sugars? yes    8) Have you gained weight?yes       9) Do you follow an exercise program?no    10) Can you do better?yes      Lab Results   Component Value Date/Time    Cholesterol, total 145 08/24/2021 09:18 AM    HDL Cholesterol 31 (L) 08/24/2021 09:18 AM    LDL, calculated 88 08/24/2021 09:18 AM    LDL, calculated 58 09/04/2019 10:08 AM    Triglyceride 145 08/24/2021 09:18 AM     Lab Results   Component Value Date/Time    Hemoglobin A1c 7.0 (H) 08/24/2021 09:18 AM    Hemoglobin A1c 7.9 (H) 03/09/2021 12:00 AM    Hemoglobin A1c 7.3 (H) 11/23/2020 12:00 AM    Glucose 140 (H) 08/24/2021 09:18 AM    Glucose (POC) 193 (H) 09/02/2011 06:15 PM    Microalb/Creat ratio (ug/mg creat.) 10 11/23/2020 12:00 AM    LDL, calculated 88 08/24/2021 09:18 AM    LDL, calculated 58 09/04/2019 10:08 AM    Creatinine (POC) 0.7 01/04/2012 03:21 PM    Creatinine 0.98 08/24/2021 09:18 AM          Lab Results   Component Value Date/Time    Microalb/Creat ratio (ug/mg creat.) 10 11/23/2020 12:00 AM      Chief Complaint   Patient presents with    Diabetes    Hypertension    Labs     Fasting today    Medication Refill     he is a 60y.o. year old male who presents for evaluation. See Diabetic Report Card listed above. Patient Active Problem List    Diagnosis    [facility]A, old, [facility]iparesis [facility]    Type 2 diabetes mellitus with diabetic neuropathy ([facility]    Coronary artery disease due to lipid rich plaque    Pulmonary emphysema (HCC)    Type 2 diabetes mellitus without complication, without long-term current use of insulin (HCC)    Essential hypertension, benign    Right hand pain    Pain in both feet    Peripheral neuropathy    Idiopathic peripheral neuropathy    Chronic neck pain    Cervical spondylosis    Degenerative cervical disc    Hyperlipidemia    History of MI (myocardial infarction)    RSD (reflex sympathetic dystrophy)    Paranoia (psychosis) ([facility]    Delusions of persecution    Schizoaffective disorder ([facility]       Reviewed PmHx, RxHx, FmHx, SocHx, AllgHx--dated and updated in the chart. Review of Systems - negative except as listed above in the HPI    Objective:     Vitals:    05/16/22 0912   BP: 122/70   Pulse: 75   Resp: (!) 75   Temp: 97.6 °F (36.4 °C)   SpO2: 96%   Weight: 160 lb (72.6 kg)   Height: 5' 9\" (1.753 m)         Assessment/ Plan:   Diagnoses and all orders for this visit:    1. Type 2 diabetes mellitus with diabetic neuropathy, without long-term current use of insulin (HCC)  -     HEMOGLOBIN A1C WITH EAG; Future  -     MICROALBUMIN, UR, RAND W/ MICROALB/CREAT RATIO; Future  -     LIPID PANEL; Future  -     METABOLIC PANEL, COMPREHENSIVE; Future  -     CBC WITH AUTOMATED DIFF; Future  -     TSH 3RD GENERATION; Future  -just at goal    2. Schizoaffective disorder, bipolar type (HCC)  -stable    3. CVA, old, hemiparesis ([facility]  -     LIPID PANEL; Future  -     METABOLIC PANEL, COMPREHENSIVE; Future    4. Pulmonary emphysema, unspecified emphysema type ([facility]    5. Essential hypertension, benign  -     LIPID PANEL; Future  -     METABOLIC PANEL, COMPREHENSIVE; Future  -at goal    6.  Pure hypercholesterolemia  - LIPID PANEL; Future  -     METABOLIC PANEL, COMPREHENSIVE; Future    7. Coronary artery disease due to lipid rich plaque  -     LIPID PANEL; Future  -     METABOLIC PANEL, COMPREHENSIVE; Future    Other orders  -     nitroglycerin (NITROSTAT) 0.4 mg SL tablet; 1 Tablet by SubLINGual route every five (5) minutes as needed for Chest Pain. Up to 3 doses. Follow-up and Dispositions    · Return in about 6 months (around 11/16/2022). Lab Results   Component Value Date/Time    Cholesterol, total 145 08/24/2021 09:18 AM    HDL Cholesterol 31 (L) 08/24/2021 09:18 AM    LDL, calculated 88 08/24/2021 09:18 AM    LDL, calculated 58 09/04/2019 10:08 AM    Triglyceride 145 08/24/2021 09:18 AM     Lab Results   Component Value Date/Time    Hemoglobin A1c 7.0 (H) 08/24/2021 09:18 AM    Hemoglobin A1c 7.9 (H) 03/09/2021 12:00 AM    Hemoglobin A1c 7.3 (H) 11/23/2020 12:00 AM    Microalb/Creat ratio (ug/mg creat.) 10 11/23/2020 12:00 AM    LDL, calculated 88 08/24/2021 09:18 AM    LDL, calculated 58 09/04/2019 10:08 AM    Creatinine (POC) 0.7 01/04/2012 03:21 PM    Creatinine 0.98 08/24/2021 09:18 AM          Discussed with patient goal of Diabetes to include:  HgA1C <7, LDL cholesterol <100, Blood pressure <140/80. Discussed with patient diet and weight management and to get regular exercise. Recommend yearly eye exams and daily foot care. The patient understands and agrees with the plan. I have discussed the diagnosis with the patient and the intended plan as seen in the above orders. The patient has received an after-visit summary and questions were answered concerning future plans. Medication Side Effects and Warnings were discussed with patient  Patient Labs were reviewed and or requested  Patient Past Records were reviewed and or requested    Jewell Washington M.D. [address] [facility]    There are no Patient Instructions on file for this visit.

## 2022-05-17 LAB
ALBUMIN SERPL-MCNC: 4.6 G/DL (ref 3.8–4.9)
ALBUMIN/CREAT UR: 10 MG/G CREAT (ref 0–29)
ALBUMIN/GLOB SERPL: 1.9 {RATIO} (ref 1.2–2.2)
ALP SERPL-CCNC: 112 IU/L (ref 44–121)
ALT SERPL-CCNC: 13 IU/L (ref 0–44)
AST SERPL-CCNC: 14 IU/L (ref 0–40)
BASOPHILS # BLD AUTO: 0 X10E3/UL (ref 0–0.2)
BASOPHILS NFR BLD AUTO: 0 %
BILIRUB SERPL-MCNC: <0.2 MG/DL (ref 0–1.2)
BUN SERPL-MCNC: 28 MG/DL (ref 6–24)
BUN/CREAT SERPL: 30 (ref 9–20)
CALCIUM SERPL-MCNC: 9.2 MG/DL (ref 8.7–10.2)
CHLORIDE SERPL-SCNC: 101 MMOL/L (ref 96–106)
CHOLEST SERPL-MCNC: 131 MG/DL (ref 100–199)
CO2 SERPL-SCNC: 16 MMOL/L (ref 20–29)
CREAT SERPL-MCNC: 0.93 MG/DL (ref 0.76–1.27)
CREAT UR-MCNC: 51.4 MG/DL
EGFR: 95 ML/MIN/1.73
EOSINOPHIL # BLD AUTO: 0.1 X10E3/UL (ref 0–0.4)
EOSINOPHIL NFR BLD AUTO: 1 %
ERYTHROCYTE [DISTWIDTH] IN BLOOD BY AUTOMATED COUNT: 13.9 % (ref 11.6–15.4)
EST. AVERAGE GLUCOSE BLD GHB EST-MCNC: 180 MG/DL
GLOBULIN SER CALC-MCNC: 2.4 G/DL (ref 1.5–4.5)
GLUCOSE SERPL-MCNC: 162 MG/DL (ref 65–99)
HBA1C MFR BLD: 7.9 % (ref 4.8–5.6)
HCT VFR BLD AUTO: 41.6 % (ref 37.5–51)
HDLC SERPL-MCNC: 25 MG/DL
HGB BLD-MCNC: 13.7 G/DL (ref 13–17.7)
IMM GRANULOCYTES # BLD AUTO: 0 X10E3/UL (ref 0–0.1)
IMM GRANULOCYTES NFR BLD AUTO: 0 %
IMP & REVIEW OF LAB RESULTS: NORMAL
LDLC SERPL CALC-MCNC: 71 MG/DL (ref 0–99)
LYMPHOCYTES # BLD AUTO: 1 X10E3/UL (ref 0.7–3.1)
LYMPHOCYTES NFR BLD AUTO: 12 %
MCH RBC QN AUTO: 31.1 PG (ref 26.6–33)
MCHC RBC AUTO-ENTMCNC: 32.9 G/DL (ref 31.5–35.7)
MCV RBC AUTO: 95 FL (ref 79–97)
MICROALBUMIN UR-MCNC: 5 UG/ML
MONOCYTES # BLD AUTO: 0.4 X10E3/UL (ref 0.1–0.9)
MONOCYTES NFR BLD AUTO: 4 %
NEUTROPHILS # BLD AUTO: 7.2 X10E3/UL (ref 1.4–7)
NEUTROPHILS NFR BLD AUTO: 83 %
PLATELET # BLD AUTO: 256 X10E3/UL (ref 150–450)
POTASSIUM SERPL-SCNC: 4.6 MMOL/L (ref 3.5–5.2)
PROT SERPL-MCNC: 7 G/DL (ref 6–8.5)
RBC # BLD AUTO: 4.4 X10E6/UL (ref 4.14–5.8)
SODIUM SERPL-SCNC: 137 MMOL/L (ref 134–144)
TRIGL SERPL-MCNC: 207 MG/DL (ref 0–149)
TSH SERPL DL<=0.005 MIU/L-ACNC: 1.37 UIU/ML (ref 0.45–4.5)
VLDLC SERPL CALC-MCNC: 35 MG/DL (ref 5–40)
WBC # BLD AUTO: 8.7 X10E3/UL (ref 3.4–10.8)

## 2022-05-18 NOTE — PROGRESS NOTES
LVM to return call to office regarding previous message. To patient regarding results/ recommendations. Results/ rec. Also mailed to patient .

## 2022-05-19 DIAGNOSIS — I10 ESSENTIAL HYPERTENSION, BENIGN: ICD-10-CM

## 2022-05-19 RX ORDER — POTASSIUM CHLORIDE 1500 MG/1
TABLET, FILM COATED, EXTENDED RELEASE ORAL
Qty: 90 TABLET | Refills: 0 | Status: SHIPPED | OUTPATIENT
Start: 2022-05-19 | End: 2022-09-01

## 2022-05-27 DIAGNOSIS — E11.9 TYPE 2 DIABETES MELLITUS WITHOUT COMPLICATION, WITHOUT LONG-TERM CURRENT USE OF INSULIN (HCC): ICD-10-CM

## 2022-05-31 DIAGNOSIS — E11.9 TYPE 2 DIABETES MELLITUS WITHOUT COMPLICATION, WITHOUT LONG-TERM CURRENT USE OF INSULIN (HCC): ICD-10-CM

## 2022-05-31 RX ORDER — GLIMEPIRIDE 2 MG/1
TABLET ORAL
Qty: 180 TABLET | Refills: 0 | Status: SHIPPED | OUTPATIENT
Start: 2022-05-31 | End: 2022-09-01

## 2022-05-31 RX ORDER — METFORMIN HYDROCHLORIDE 500 MG/1
TABLET, EXTENDED RELEASE ORAL
Qty: 180 TABLET | Refills: 1 | Status: SHIPPED | OUTPATIENT
Start: 2022-05-31

## 2022-06-06 DIAGNOSIS — M50.30 DEGENERATIVE CERVICAL DISC: ICD-10-CM

## 2022-06-06 RX ORDER — CYCLOBENZAPRINE HCL 10 MG
TABLET ORAL
Qty: 90 TABLET | Refills: 0 | Status: SHIPPED | OUTPATIENT
Start: 2022-06-06 | End: 2022-08-18

## 2022-07-07 ENCOUNTER — TELEPHONE (OUTPATIENT)
Dept: FAMILY MEDICINE CLINIC | Age: 57
End: 2022-07-07

## 2022-07-07 NOTE — TELEPHONE ENCOUNTER
Returned mother Anyi's call. She states patient is in [facility] after having a stroke. They want to send him to [address] [place] [address] or [facility], but she will not be able to drive that far to help him. She was asking if there were any rehab places near [place]. I gave her phone number for [facility].

## 2022-07-07 NOTE — TELEPHONE ENCOUNTER
Pts mother Hung Glover not on hippa called in and states he had a stroke and is in [facility]. States he needs to go to rehab and [facility] only wants to put him somewhere in [place] etc. Hung Glover states she needs to be able to transport back and forth to help him due to his whole left side needing worked on. [place] and is wondering there is some where close to home he can go to. Call back number for her is [phone]. Thanks.

## 2022-07-15 ENCOUNTER — TELEPHONE (OUTPATIENT)
Dept: FAMILY MEDICINE CLINIC | Age: 57
End: 2022-07-15

## 2022-07-15 NOTE — TELEPHONE ENCOUNTER
Pt's mother Rosalinda Bernal [phone] is calling because pt had a stroke and is now in [place] please advise mother is not on HIPPA

## 2022-08-15 ENCOUNTER — TELEPHONE (OUTPATIENT)
Dept: FAMILY MEDICINE CLINIC | Age: 57
End: 2022-08-15

## 2022-08-15 NOTE — TELEPHONE ENCOUNTER
----- Message from Eduar Argueta sent at 8/15/2022  9:52 AM EDT -----  Subject: Hospital Follow Up    QUESTIONS  What hospital was the Patient Discharged from? [facility]  Date of Discharge? 2022-08-12  Discharge Location? Home  Reason for hospitalization as patient stated? Stroke and heart issues. What question does the patient have, if applicable?   ---------------------------------------------------------------------------  --------------  CALL BACK INFO  What is the best way for the office to contact you? OK to leave message on   voicemail  Preferred Call Back Phone Number? [phone]  ---------------------------------------------------------------------------  --------------  SCRIPT ANSWERS  Relationship to Patient? Self  (Patient requests to see provider urgently. )? No  (Has the patient been discharged from the hospital within 2 business days   AND does not have a Telephone Encounter  Follow Up From [address] [place] [address]   documented in [facility]?)?  Yes

## 2022-08-15 NOTE — TELEPHONE ENCOUNTER
Lvm to return call to office to schedule a hospital f/up  appt from hospital discharge. Please bring [facility] discharge paperwork with him to appt.

## 2022-08-16 ENCOUNTER — DOCUMENTATION ONLY (OUTPATIENT)
Dept: FAMILY MEDICINE CLINIC | Age: 57
End: 2022-08-16

## 2022-08-18 DIAGNOSIS — M50.30 DEGENERATIVE CERVICAL DISC: ICD-10-CM

## 2022-08-18 RX ORDER — CYCLOBENZAPRINE HCL 10 MG
TABLET ORAL
Qty: 90 TABLET | Refills: 0 | Status: SHIPPED | OUTPATIENT
Start: 2022-08-18 | End: 2022-09-26

## 2022-08-31 DIAGNOSIS — E11.40 TYPE 2 DIABETES MELLITUS WITH DIABETIC NEUROPATHY, WITHOUT LONG-TERM CURRENT USE OF INSULIN (HCC): ICD-10-CM

## 2022-08-31 DIAGNOSIS — M50.30 DEGENERATIVE CERVICAL DISC: ICD-10-CM

## 2022-08-31 DIAGNOSIS — I10 ESSENTIAL HYPERTENSION, BENIGN: ICD-10-CM

## 2022-08-31 DIAGNOSIS — E11.9 TYPE 2 DIABETES MELLITUS WITHOUT COMPLICATION, WITHOUT LONG-TERM CURRENT USE OF INSULIN (HCC): ICD-10-CM

## 2022-09-01 ENCOUNTER — DOCUMENTATION ONLY (OUTPATIENT)
Dept: FAMILY MEDICINE CLINIC | Age: 57
End: 2022-09-01

## 2022-09-01 RX ORDER — SITAGLIPTIN 100 MG/1
TABLET, FILM COATED ORAL
Qty: 90 TABLET | Refills: 0 | Status: SHIPPED | OUTPATIENT
Start: 2022-09-01

## 2022-09-01 RX ORDER — POTASSIUM CHLORIDE 1500 MG/1
TABLET, FILM COATED, EXTENDED RELEASE ORAL
Qty: 90 TABLET | Refills: 0 | Status: SHIPPED | OUTPATIENT
Start: 2022-09-01

## 2022-09-01 RX ORDER — IBUPROFEN 800 MG/1
TABLET ORAL
Qty: 270 TABLET | Refills: 0 | Status: SHIPPED | OUTPATIENT
Start: 2022-09-01

## 2022-09-01 RX ORDER — GLIMEPIRIDE 2 MG/1
TABLET ORAL
Qty: 180 TABLET | Refills: 0 | Status: SHIPPED | OUTPATIENT
Start: 2022-09-01

## 2022-09-07 ENCOUNTER — DOCUMENTATION ONLY (OUTPATIENT)
Dept: FAMILY MEDICINE CLINIC | Age: 57
End: 2022-09-07

## 2022-09-07 NOTE — PROGRESS NOTES
[employer] request was signed & faxed to [phone],JERRELL,SUSSYTB placed in scan folder to be scanned to chart.

## 2022-09-13 ENCOUNTER — OFFICE VISIT (OUTPATIENT)
Dept: FAMILY MEDICINE CLINIC | Age: 57
End: 2022-09-13
Payer: MEDICARE

## 2022-09-13 VITALS
DIASTOLIC BLOOD PRESSURE: 78 MMHG | RESPIRATION RATE: 20 BRPM | TEMPERATURE: 98.2 F | HEIGHT: 69 IN | WEIGHT: 151 LBS | SYSTOLIC BLOOD PRESSURE: 134 MMHG | BODY MASS INDEX: 22.36 KG/M2 | OXYGEN SATURATION: 97 % | HEART RATE: 71 BPM

## 2022-09-13 DIAGNOSIS — M50.30 DEGENERATIVE CERVICAL DISC: ICD-10-CM

## 2022-09-13 DIAGNOSIS — E11.40 TYPE 2 DIABETES MELLITUS WITH DIABETIC NEUROPATHY, WITHOUT LONG-TERM CURRENT USE OF INSULIN (HCC): ICD-10-CM

## 2022-09-13 DIAGNOSIS — I69.359 CVA, OLD, HEMIPARESIS (HCC): Primary | ICD-10-CM

## 2022-09-13 DIAGNOSIS — E11.9 TYPE 2 DIABETES MELLITUS WITHOUT COMPLICATION, WITHOUT LONG-TERM CURRENT USE OF INSULIN (HCC): ICD-10-CM

## 2022-09-13 PROCEDURE — 99214 OFFICE O/P EST MOD 30 MIN: CPT | Performed by: FAMILY MEDICINE

## 2022-09-13 PROCEDURE — G8752 SYS BP LESS 140: HCPCS | Performed by: FAMILY MEDICINE

## 2022-09-13 PROCEDURE — G8510 SCR DEP NEG, NO PLAN REQD: HCPCS | Performed by: FAMILY MEDICINE

## 2022-09-13 PROCEDURE — 3017F COLORECTAL CA SCREEN DOC REV: CPT | Performed by: FAMILY MEDICINE

## 2022-09-13 PROCEDURE — G8420 CALC BMI NORM PARAMETERS: HCPCS | Performed by: FAMILY MEDICINE

## 2022-09-13 PROCEDURE — G8754 DIAS BP LESS 90: HCPCS | Performed by: FAMILY MEDICINE

## 2022-09-13 PROCEDURE — 3051F HG A1C>EQUAL 7.0%<8.0%: CPT | Performed by: FAMILY MEDICINE

## 2022-09-13 PROCEDURE — 2022F DILAT RTA XM EVC RTNOPTHY: CPT | Performed by: FAMILY MEDICINE

## 2022-09-13 PROCEDURE — G8427 DOCREV CUR MEDS BY ELIG CLIN: HCPCS | Performed by: FAMILY MEDICINE

## 2022-09-13 RX ORDER — ATORVASTATIN CALCIUM 80 MG/1
80 TABLET, FILM COATED ORAL DAILY
Qty: 90 TABLET | Refills: 3 | Status: SHIPPED | OUTPATIENT
Start: 2022-09-13

## 2022-09-13 RX ORDER — ASPIRIN 81 MG/1
TABLET ORAL DAILY
COMMUNITY

## 2022-09-13 RX ORDER — GABAPENTIN 300 MG/1
CAPSULE ORAL
Qty: 90 CAPSULE | Refills: 3 | Status: SHIPPED | OUTPATIENT
Start: 2022-09-13

## 2022-09-13 RX ORDER — METOPROLOL TARTRATE 25 MG/1
25 TABLET, FILM COATED ORAL 2 TIMES DAILY
COMMUNITY
End: 2022-09-13 | Stop reason: SDUPTHER

## 2022-09-13 RX ORDER — METOPROLOL TARTRATE 25 MG/1
25 TABLET, FILM COATED ORAL 2 TIMES DAILY
Qty: 90 TABLET | Refills: 1 | Status: SHIPPED | OUTPATIENT
Start: 2022-09-13

## 2022-09-13 RX ORDER — PANTOPRAZOLE SODIUM 40 MG/1
40 TABLET, DELAYED RELEASE ORAL DAILY
Qty: 90 TABLET | Refills: 3 | Status: SHIPPED | OUTPATIENT
Start: 2022-09-13

## 2022-09-13 RX ORDER — ATORVASTATIN CALCIUM 80 MG/1
80 TABLET, FILM COATED ORAL DAILY
COMMUNITY
End: 2022-09-13 | Stop reason: SDUPTHER

## 2022-09-13 RX ORDER — CLOPIDOGREL BISULFATE 75 MG/1
75 TABLET ORAL DAILY
Qty: 90 TABLET | Refills: 1 | Status: SHIPPED | OUTPATIENT
Start: 2022-09-13

## 2022-09-13 RX ORDER — PANTOPRAZOLE SODIUM 40 MG/1
40 TABLET, DELAYED RELEASE ORAL DAILY
COMMUNITY
End: 2022-09-13 | Stop reason: SDUPTHER

## 2022-09-13 NOTE — PROGRESS NOTES
Patient her transition of care. JW with mild heart failure and cva. Left arm and leg weak. Left facial drooping and some left forehead numbness reported by patient. [facility] doing Pt/OT. 1. Have you been to the ER, urgent care clinic since your last visit? Hospitalized since your last visit? Yes Where: [facility], discharged 8/12/2022    2. Have you seen or consulted any other health care providers outside of the [address] [place] [address] since your last visit? Include any pap smears or colon screening. No           Chief Complaint   Patient presents with    Hospital Follow Up     7/2/2022 cva     He is a 60 y.o. male who presents for evalution. Reviewed PmHx, RxHx, FmHx, SocHx, AllgHx and updated and dated in the chart. Patient Active Problem List    Diagnosis    CVA, old, hemiparesis ([facility]    Type 2 diabetes mellitus with diabetic neuropathy ([facility]    Coronary artery disease due to lipid rich plaque    Pulmonary emphysema ([facility])    Type 2 diabetes mellitus without complication, without long-term current use of insulin ([facility])    Essential hypertension, benign    Right hand pain    Pain in both feet    Peripheral neuropathy    Idiopathic peripheral neuropathy    Chronic neck pain    Cervical spondylosis    Degenerative cervical disc    Hyperlipidemia    History of MI (myocardial infarction)    RSD (reflex sympathetic dystrophy)    Paranoia (psychosis) ([address]    Delusions of persecution    Schizoaffective disorder ([address]       Review of Systems - negative except as listed above in the HPI    Objective:     Vitals:    09/13/22 1335   BP: 134/78   Pulse: 71   Resp: 20   Temp: 98.2 °F (36.8 °C)   SpO2: 97%   Weight: 151 lb (68.5 kg)   Height: 5' 9\" (1.753 m)         Assessment/ Plan:   Diagnoses and all orders for this visit:    1. CVA, old, hemiparesis (HCC)  -     gabapentin (NEURONTIN) 300 mg capsule;  Take 1 capsule by mouth at bedtime  -slowly improving, cont PT and restart plavix and hold asa  -refer neuro    2. Type 2 diabetes mellitus without complication, without long-term current use of insulin (HCC)  -     gabapentin (NEURONTIN) 300 mg capsule; Take 1 capsule by mouth at bedtime  Lab Results   Component Value Date/Time    Hemoglobin A1c 7.9 (H) 05/16/2022 12:00 AM     -at goal    3. Type 2 diabetes mellitus with diabetic neuropathy, without long-term current use of insulin (HCC)  -     gabapentin (NEURONTIN) 300 mg capsule; Take 1 capsule by mouth at bedtime    4. Degenerative cervical disc  -stable    Other orders  -     atorvastatin (LIPITOR) 80 mg tablet; Take 1 Tablet by mouth daily. -     metoprolol tartrate (LOPRESSOR) 25 mg tablet; Take 1 Tablet by mouth two (2) times a day. -     pantoprazole (PROTONIX) 40 mg tablet; Take 1 Tablet by mouth daily. -     clopidogreL (PLAVIX) 75 mg tab; Take 1 Tablet by mouth daily. Follow-up and Dispositions    Return in about 6 months (around 3/13/2023). I have discussed the diagnosis with the patient and the intended plan as seen in the above orders. The patient understands and agrees with the plan. The patient has received an after-visit summary and questions were answered concerning future plans. Medication Side Effects and Warnings were discussed with patient  Patient Labs were reviewed and or requested:  Patient Past Records were reviewed and or requested    Tha Fischer M.D. There are no Patient Instructions on file for this visit.

## 2022-09-13 NOTE — PROGRESS NOTES
Patient her transition of care. JW with mild heart failure and cva. Left arm and leg weak. Left facial drooping and some left forehead numbness reported by patient. [facility] doing Pt/OT. 1. Have you been to the ER, urgent care clinic since your last visit? Hospitalized since your last visit? Yes Where: [facility], discharged 8/12/2022    2. Have you seen or consulted any other health care providers outside of the [address] [place] [address] since your last visit? Include any pap smears or colon screening.  No

## 2022-09-16 ENCOUNTER — TELEPHONE (OUTPATIENT)
Dept: FAMILY MEDICINE CLINIC | Age: 57
End: 2022-09-16

## 2022-09-16 ENCOUNTER — DOCUMENTATION ONLY (OUTPATIENT)
Dept: FAMILY MEDICINE CLINIC | Age: 57
End: 2022-09-16

## 2022-09-16 NOTE — TELEPHONE ENCOUNTER
Gracy from [facility] calling to get a verbal to do 3 more visits of speech therapy with pt. Call back number for her is [phone]. Thanks.

## 2022-09-16 NOTE — TELEPHONE ENCOUNTER
Returned call to [facility] - verified pt's name and birth date. She was given the verbal order, per provider.     MD Li Levy LPN 4 hours ago (41:25 AM)     YO ernandez

## 2022-09-26 DIAGNOSIS — M50.30 DEGENERATIVE CERVICAL DISC: ICD-10-CM

## 2022-09-26 DIAGNOSIS — I10 ESSENTIAL HYPERTENSION, BENIGN: ICD-10-CM

## 2022-09-26 RX ORDER — CYCLOBENZAPRINE HCL 10 MG
TABLET ORAL
Qty: 90 TABLET | Refills: 0 | Status: SHIPPED | OUTPATIENT
Start: 2022-09-26 | End: 2022-10-06

## 2022-09-26 RX ORDER — FUROSEMIDE 20 MG/1
TABLET ORAL
Qty: 90 TABLET | Refills: 0 | Status: SHIPPED | OUTPATIENT
Start: 2022-09-26

## 2022-09-27 ENCOUNTER — TELEPHONE (OUTPATIENT)
Dept: FAMILY MEDICINE CLINIC | Age: 57
End: 2022-09-27

## 2022-09-27 NOTE — TELEPHONE ENCOUNTER
----- Message from Mallory Burton sent at 9/27/2022  4:48 PM EDT -----  Subject: Message to Provider    QUESTIONS  Information for Provider? pt insurance says that the blood glucose monitor   was rejected  They think it was sent to wrong location  It needs to go   to [facility] [place]  Phone [phone]  ---------------------------------------------------------------------------  --------------  [address]  [phone]; OK to leave message on voicemail  ---------------------------------------------------------------------------  --------------  SCRIPT ANSWERS  Relationship to Patient? Third Party  Third Party Type? Insurance? Representative Name?  Estelita

## 2022-09-28 NOTE — TELEPHONE ENCOUNTER
Estelita returned my call. Order for [facility] to be faxed to [address], to be covered by Juliana Rocha. Order for sensors faxed to Ricky Márquez at [phone]. LVM to patient  to let him know.

## 2022-09-28 NOTE — TELEPHONE ENCOUNTER
Patient returned my call. Info given about sending order to [employer] for [address] sensors. Patient also states [facility] denied shower chair. Encouraged to go to a specialty pharmacy like [facility]. They have equipment there for reasonable pricing. Patient verbalizes understanding.

## 2022-09-28 NOTE — TELEPHONE ENCOUNTER
Called [facility] Kathia, on phone/hold over 30 minutes. Transferred from [address] [place] [address] Road to [facility] to [place] [facility]. Unable to order [employer]. Paulo Masterson states they don't have access to patient's information so they can not run the order through. Returned call to [facility] [employer]. Explained events of call this am. She was going to make some phone calls and call me back. Please ring through to me when she calls back!

## 2022-10-03 ENCOUNTER — DOCUMENTATION ONLY (OUTPATIENT)
Dept: FAMILY MEDICINE CLINIC | Age: 57
End: 2022-10-03

## 2022-10-03 NOTE — PROGRESS NOTES
Patient came in to office today with [facility] 2 sensor. Needed instruction how to place sensors. Patient had a cell phone and states he could not link his phone with the li. After several attempts of trying, phone was not compatible with [employer] li. [facility] pharmacy called and Marcus disla ordered verbally. [place] should be ready this afternoon. Sensor was not placed on patient. Instructed patient to go to [employer] to  reader and on Thursday he can bring both reader and sensor in and I will set him up. Patient verbalizes understanding.

## 2022-10-04 ENCOUNTER — DOCUMENTATION ONLY (OUTPATIENT)
Dept: FAMILY MEDICINE CLINIC | Age: 57
End: 2022-10-04

## 2022-10-05 ENCOUNTER — DOCUMENTATION ONLY (OUTPATIENT)
Dept: FAMILY MEDICINE CLINIC | Age: 57
End: 2022-10-05

## 2022-10-05 DIAGNOSIS — M50.30 DEGENERATIVE CERVICAL DISC: ICD-10-CM

## 2022-10-05 NOTE — PROGRESS NOTES
[facility] [address] [facility] was signed & faxed to [phone],ok,Copy placed in scan folder to be scanned to chart.

## 2022-10-06 RX ORDER — CYCLOBENZAPRINE HCL 10 MG
TABLET ORAL
Qty: 90 TABLET | Refills: 0 | Status: SHIPPED | OUTPATIENT
Start: 2022-10-06

## 2022-10-20 ENCOUNTER — DOCUMENTATION ONLY (OUTPATIENT)
Dept: FAMILY MEDICINE CLINIC | Age: 57
End: 2022-10-20

## 2022-10-20 NOTE — PROGRESS NOTES
[facility] order, home health certification & POC were put on [facility] desk to process

## 2022-10-26 ENCOUNTER — DOCUMENTATION ONLY (OUTPATIENT)
Dept: FAMILY MEDICINE CLINIC | Age: 57
End: 2022-10-26

## 2022-10-26 NOTE — PROGRESS NOTES
[facility] order, home health certification & POC were signed & faxed to [phone],ok,Copy placed in scan folder to be scanned to chart.

## 2022-11-04 ENCOUNTER — TELEPHONE (OUTPATIENT)
Dept: FAMILY MEDICINE CLINIC | Age: 57
End: 2022-11-04

## 2022-11-04 ENCOUNTER — DOCUMENTATION ONLY (OUTPATIENT)
Dept: FAMILY MEDICINE CLINIC | Age: 57
End: 2022-11-04

## 2022-11-04 NOTE — TELEPHONE ENCOUNTER
Daiana with [facility] called in and wanted to let you know they haven't been able to see him. They are waiting on authorization to see him, hoping to see him next week. Call back number for her in case is [phone].

## 2022-11-08 ENCOUNTER — DOCUMENTATION ONLY (OUTPATIENT)
Dept: FAMILY MEDICINE CLINIC | Age: 57
End: 2022-11-08

## 2022-11-10 ENCOUNTER — DOCUMENTATION ONLY (OUTPATIENT)
Dept: FAMILY MEDICINE CLINIC | Age: 57
End: 2022-11-10

## 2022-11-14 ENCOUNTER — DOCUMENTATION ONLY (OUTPATIENT)
Dept: FAMILY MEDICINE CLINIC | Age: 57
End: 2022-11-14

## 2022-11-15 ENCOUNTER — DOCUMENTATION ONLY (OUTPATIENT)
Dept: FAMILY MEDICINE CLINIC | Age: 57
End: 2022-11-15

## 2022-11-15 NOTE — PROGRESS NOTES
[facility] order was signed & faxed to [phone],ok,Copy placed in scan folder to be scanned to chart.

## 2022-11-19 DIAGNOSIS — E11.9 TYPE 2 DIABETES MELLITUS WITHOUT COMPLICATION, WITHOUT LONG-TERM CURRENT USE OF INSULIN (HCC): ICD-10-CM

## 2022-11-21 ENCOUNTER — DOCUMENTATION ONLY (OUTPATIENT)
Dept: FAMILY MEDICINE CLINIC | Age: 57
End: 2022-11-21

## 2022-11-21 RX ORDER — GLIMEPIRIDE 2 MG/1
TABLET ORAL
Qty: 180 TABLET | Refills: 0 | Status: SHIPPED | OUTPATIENT
Start: 2022-11-21

## 2022-11-21 RX ORDER — METFORMIN HYDROCHLORIDE 500 MG/1
TABLET, EXTENDED RELEASE ORAL
Qty: 180 TABLET | Refills: 0 | Status: SHIPPED | OUTPATIENT
Start: 2022-11-21

## 2022-11-21 NOTE — PROGRESS NOTES
[facility] orders were signed & faxed to [phone],ok,Copy placed in scan folder to be scanned to chart.

## 2022-11-28 ENCOUNTER — DOCUMENTATION ONLY (OUTPATIENT)
Dept: FAMILY MEDICINE CLINIC | Age: 57
End: 2022-11-28

## 2022-11-28 NOTE — PROGRESS NOTES
[facility] order was put on [facility] desk to process

## 2022-12-01 ENCOUNTER — DOCUMENTATION ONLY (OUTPATIENT)
Dept: FAMILY MEDICINE CLINIC | Age: 57
End: 2022-12-01

## 2022-12-01 NOTE — PROGRESS NOTES
[facility] order was signed & faxed to [phone],ok,Copy placed in scan folder to be scanned to chart.

## 2022-12-10 DIAGNOSIS — M50.30 DEGENERATIVE CERVICAL DISC: ICD-10-CM

## 2022-12-10 DIAGNOSIS — E11.40 TYPE 2 DIABETES MELLITUS WITH DIABETIC NEUROPATHY, WITHOUT LONG-TERM CURRENT USE OF INSULIN (HCC): ICD-10-CM

## 2022-12-12 RX ORDER — CYCLOBENZAPRINE HCL 10 MG
TABLET ORAL
Qty: 90 TABLET | Refills: 0 | Status: SHIPPED | OUTPATIENT
Start: 2022-12-12

## 2022-12-12 RX ORDER — SITAGLIPTIN 100 MG/1
TABLET, FILM COATED ORAL
Qty: 90 TABLET | Refills: 0 | Status: SHIPPED | OUTPATIENT
Start: 2022-12-12

## 2022-12-14 DIAGNOSIS — I10 ESSENTIAL HYPERTENSION, BENIGN: ICD-10-CM

## 2022-12-14 RX ORDER — POTASSIUM CHLORIDE 1500 MG/1
TABLET, FILM COATED, EXTENDED RELEASE ORAL
Qty: 28 TABLET | Refills: 0 | Status: SHIPPED | OUTPATIENT
Start: 2022-12-14

## 2022-12-14 RX ORDER — FUROSEMIDE 20 MG/1
TABLET ORAL
Qty: 90 TABLET | Refills: 0 | Status: SHIPPED | OUTPATIENT
Start: 2022-12-14

## 2023-01-04 DIAGNOSIS — I10 ESSENTIAL HYPERTENSION, BENIGN: ICD-10-CM

## 2023-01-05 RX ORDER — FUROSEMIDE 20 MG/1
TABLET ORAL
Qty: 90 TABLET | Refills: 0 | Status: SHIPPED | OUTPATIENT
Start: 2023-01-05

## 2023-01-09 RX ORDER — CLOPIDOGREL BISULFATE 75 MG/1
75 TABLET ORAL DAILY
Qty: 90 TABLET | Refills: 1 | Status: SHIPPED | OUTPATIENT
Start: 2023-01-09

## 2023-01-11 ENCOUNTER — TELEPHONE (OUTPATIENT)
Dept: FAMILY MEDICINE CLINIC | Age: 58
End: 2023-01-11

## 2023-01-11 NOTE — TELEPHONE ENCOUNTER
----- Message from [place] sent at 1/11/2023 12:03 PM EST -----  Subject: Message to Provider    QUESTIONS  Information for Provider? Leona Mare is calling from [employer]. Pt is   waiting on a prior authorization for his free style raisa kit (2 sensor). It was submitted to [employer] several times and it was rejected. Please   call [phone] for the prior authorizations, or [employer] number is   [phone]. Please call pt back to let him know the status of her   authorization.   ---------------------------------------------------------------------------  --------------  Indira YOUNG  [phone]; OK to leave message on voicemail  ---------------------------------------------------------------------------  --------------  SCRIPT ANSWERS  Relationship to Patient? Third Party  Third Party Type? Insurance? Representative Name?  Warren Burk

## 2023-01-14 DIAGNOSIS — M50.30 DEGENERATIVE CERVICAL DISC: ICD-10-CM

## 2023-01-16 RX ORDER — CYCLOBENZAPRINE HCL 10 MG
TABLET ORAL
Qty: 90 TABLET | Refills: 0 | Status: SHIPPED | OUTPATIENT
Start: 2023-01-16

## 2023-01-19 ENCOUNTER — TELEPHONE (OUTPATIENT)
Dept: FAMILY MEDICINE CLINIC | Age: 58
End: 2023-01-19

## 2023-01-19 NOTE — TELEPHONE ENCOUNTER
[facility] medicaid called for reason why pt needs Freestyle Adonis & is not on any insulin, pt was on line stating he checks his glucose 3-4 times a day, but is only taking oral meds.

## 2023-01-27 ENCOUNTER — TELEPHONE (OUTPATIENT)
Dept: FAMILY MEDICINE CLINIC | Age: 58
End: 2023-01-27

## 2023-01-27 NOTE — TELEPHONE ENCOUNTER
----- Message from Torey Alicea sent at 1/27/2023 12:10 PM EST -----  Subject: Referral Request    Reason for referral request? Patient needs a written referral to Neurology   Dr. Vincenzo Sorto MD for appt 2/15/23. He asked for the referral to be printed   and mailed to him to take to the appt.    Provider patient wants to be referred to(if known):     Provider Phone Number(if known):[phone]    Additional Information for Provider?   ---------------------------------------------------------------------------  --------------  [employer]    [phone]; OK to leave message on voicemail  ---------------------------------------------------------------------------  --------------

## 2023-01-30 DIAGNOSIS — M79.671 PAIN IN BOTH FEET: Primary | ICD-10-CM

## 2023-01-30 DIAGNOSIS — M79.672 PAIN IN BOTH FEET: Primary | ICD-10-CM

## 2023-01-30 NOTE — TELEPHONE ENCOUNTER
Doctor Referal needed in the computer for 2/15/2023 appointment.     Truett Fabry, MD  [address] [facility] Neu[facility]    [phone]    [address]  Get Directions    [address]  [place], [address] [facility]  Get Directions

## 2023-02-01 DIAGNOSIS — E11.9 TYPE 2 DIABETES MELLITUS WITHOUT COMPLICATION, WITHOUT LONG-TERM CURRENT USE OF INSULIN (HCC): ICD-10-CM

## 2023-02-02 RX ORDER — GLIMEPIRIDE 2 MG/1
TABLET ORAL
Qty: 180 TABLET | Refills: 0 | Status: SHIPPED | OUTPATIENT
Start: 2023-02-02

## 2023-02-02 RX ORDER — METFORMIN HYDROCHLORIDE 500 MG/1
TABLET, EXTENDED RELEASE ORAL
Qty: 180 TABLET | Refills: 0 | Status: SHIPPED | OUTPATIENT
Start: 2023-02-02

## 2023-02-06 DIAGNOSIS — E11.9 TYPE 2 DIABETES MELLITUS WITHOUT COMPLICATION, WITHOUT LONG-TERM CURRENT USE OF INSULIN (HCC): ICD-10-CM

## 2023-02-06 RX ORDER — METFORMIN HYDROCHLORIDE 500 MG/1
TABLET, EXTENDED RELEASE ORAL
Qty: 180 TABLET | Refills: 0 | Status: SHIPPED | OUTPATIENT
Start: 2023-02-06

## 2023-02-06 RX ORDER — GLIMEPIRIDE 2 MG/1
TABLET ORAL
Qty: 180 TABLET | Refills: 0 | Status: SHIPPED | OUTPATIENT
Start: 2023-02-06

## 2023-02-07 RX ORDER — METOPROLOL TARTRATE 25 MG/1
25 TABLET, FILM COATED ORAL 2 TIMES DAILY
Qty: 90 TABLET | Refills: 1 | Status: SHIPPED | OUTPATIENT
Start: 2023-02-07

## 2023-02-17 DIAGNOSIS — M50.30 DEGENERATIVE CERVICAL DISC: ICD-10-CM

## 2023-02-20 RX ORDER — CYCLOBENZAPRINE HCL 10 MG
TABLET ORAL
Qty: 90 TABLET | Refills: 0 | Status: SHIPPED | OUTPATIENT
Start: 2023-02-20

## 2023-03-05 DIAGNOSIS — E11.40 TYPE 2 DIABETES MELLITUS WITH DIABETIC NEUROPATHY, WITHOUT LONG-TERM CURRENT USE OF INSULIN (HCC): ICD-10-CM

## 2023-03-06 DIAGNOSIS — I10 ESSENTIAL HYPERTENSION, BENIGN: ICD-10-CM

## 2023-03-06 RX ORDER — SITAGLIPTIN 100 MG/1
TABLET, FILM COATED ORAL
Qty: 90 TABLET | Refills: 0 | Status: SHIPPED | OUTPATIENT
Start: 2023-03-06 | End: 2023-03-08

## 2023-03-07 RX ORDER — POTASSIUM CHLORIDE 1500 MG/1
TABLET, FILM COATED, EXTENDED RELEASE ORAL
Qty: 28 TABLET | Refills: 0 | Status: SHIPPED | OUTPATIENT
Start: 2023-03-07

## 2023-03-08 DIAGNOSIS — E11.40 TYPE 2 DIABETES MELLITUS WITH DIABETIC NEUROPATHY, WITHOUT LONG-TERM CURRENT USE OF INSULIN (HCC): ICD-10-CM

## 2023-03-08 RX ORDER — SITAGLIPTIN 100 MG/1
TABLET, FILM COATED ORAL
Qty: 90 TABLET | Refills: 0 | Status: SHIPPED | OUTPATIENT
Start: 2023-03-08

## 2023-03-14 DIAGNOSIS — M50.30 DEGENERATIVE CERVICAL DISC: ICD-10-CM

## 2023-03-14 RX ORDER — IBUPROFEN 800 MG/1
TABLET ORAL
Qty: 270 TABLET | Refills: 0 | Status: SHIPPED | OUTPATIENT
Start: 2023-03-14

## 2023-03-21 ENCOUNTER — OFFICE VISIT (OUTPATIENT)
Dept: FAMILY MEDICINE CLINIC | Age: 58
End: 2023-03-21
Payer: MEDICARE

## 2023-03-21 VITALS
SYSTOLIC BLOOD PRESSURE: 110 MMHG | OXYGEN SATURATION: 97 % | BODY MASS INDEX: 22.98 KG/M2 | WEIGHT: 155.19 LBS | HEIGHT: 69 IN | HEART RATE: 69 BPM | TEMPERATURE: 97.8 F | DIASTOLIC BLOOD PRESSURE: 71 MMHG | RESPIRATION RATE: 20 BRPM

## 2023-03-21 DIAGNOSIS — E11.9 TYPE 2 DIABETES MELLITUS WITHOUT COMPLICATION, WITHOUT LONG-TERM CURRENT USE OF INSULIN (HCC): Primary | ICD-10-CM

## 2023-03-21 DIAGNOSIS — E11.40 TYPE 2 DIABETES MELLITUS WITH DIABETIC NEUROPATHY, WITHOUT LONG-TERM CURRENT USE OF INSULIN (HCC): ICD-10-CM

## 2023-03-21 DIAGNOSIS — G60.9 IDIOPATHIC PERIPHERAL NEUROPATHY: ICD-10-CM

## 2023-03-21 DIAGNOSIS — I69.359 CVA, OLD, HEMIPARESIS (HCC): ICD-10-CM

## 2023-03-21 DIAGNOSIS — E78.00 PURE HYPERCHOLESTEROLEMIA: ICD-10-CM

## 2023-03-21 DIAGNOSIS — I10 ESSENTIAL HYPERTENSION, BENIGN: ICD-10-CM

## 2023-03-21 DIAGNOSIS — J43.9 PULMONARY EMPHYSEMA, UNSPECIFIED EMPHYSEMA TYPE (HCC): ICD-10-CM

## 2023-03-21 DIAGNOSIS — M50.30 DEGENERATIVE CERVICAL DISC: ICD-10-CM

## 2023-03-21 DIAGNOSIS — F25.0 SCHIZOAFFECTIVE DISORDER, BIPOLAR TYPE (HCC): ICD-10-CM

## 2023-03-21 PROCEDURE — 3078F DIAST BP <80 MM HG: CPT | Performed by: FAMILY MEDICINE

## 2023-03-21 PROCEDURE — G8420 CALC BMI NORM PARAMETERS: HCPCS | Performed by: FAMILY MEDICINE

## 2023-03-21 PROCEDURE — 3074F SYST BP LT 130 MM HG: CPT | Performed by: FAMILY MEDICINE

## 2023-03-21 PROCEDURE — 99214 OFFICE O/P EST MOD 30 MIN: CPT | Performed by: FAMILY MEDICINE

## 2023-03-21 PROCEDURE — 3017F COLORECTAL CA SCREEN DOC REV: CPT | Performed by: FAMILY MEDICINE

## 2023-03-21 PROCEDURE — 3046F HEMOGLOBIN A1C LEVEL >9.0%: CPT | Performed by: FAMILY MEDICINE

## 2023-03-21 PROCEDURE — 2022F DILAT RTA XM EVC RTNOPTHY: CPT | Performed by: FAMILY MEDICINE

## 2023-03-21 PROCEDURE — G8427 DOCREV CUR MEDS BY ELIG CLIN: HCPCS | Performed by: FAMILY MEDICINE

## 2023-03-21 PROCEDURE — G8510 SCR DEP NEG, NO PLAN REQD: HCPCS | Performed by: FAMILY MEDICINE

## 2023-03-21 RX ORDER — IBUPROFEN 800 MG/1
800 TABLET ORAL
Qty: 270 TABLET | Refills: 1 | Status: SHIPPED | OUTPATIENT
Start: 2023-03-21

## 2023-03-21 RX ORDER — CLOPIDOGREL BISULFATE 75 MG/1
75 TABLET ORAL DAILY
Qty: 90 TABLET | Refills: 1 | Status: SHIPPED | OUTPATIENT
Start: 2023-03-21

## 2023-03-21 RX ORDER — HYDROXYZINE HYDROCHLORIDE 10 MG/1
10 TABLET, FILM COATED ORAL
Qty: 270 TABLET | Refills: 3 | Status: SHIPPED | OUTPATIENT
Start: 2023-03-21

## 2023-03-21 RX ORDER — POLYETHYLENE GLYCOL 3350
1 POWDER (GRAM) MISCELLANEOUS DAILY
Qty: 2500 G | Refills: 3 | Status: SHIPPED | OUTPATIENT
Start: 2023-03-21

## 2023-03-21 RX ORDER — QUETIAPINE FUMARATE 200 MG/1
200 TABLET, FILM COATED ORAL
Qty: 30 TABLET | Status: CANCELLED | OUTPATIENT
Start: 2023-03-21

## 2023-03-21 RX ORDER — GEMFIBROZIL 600 MG/1
600 TABLET, FILM COATED ORAL 2 TIMES DAILY
Qty: 90 TABLET | Refills: 1 | Status: SHIPPED | OUTPATIENT
Start: 2023-03-21

## 2023-03-21 RX ORDER — NITROGLYCERIN 0.4 MG/1
0.4 TABLET SUBLINGUAL
Qty: 25 TABLET | Refills: 3 | Status: SHIPPED | OUTPATIENT
Start: 2023-03-21

## 2023-03-21 RX ORDER — GABAPENTIN 300 MG/1
CAPSULE ORAL
Qty: 90 CAPSULE | Refills: 3 | Status: SHIPPED | OUTPATIENT
Start: 2023-03-21

## 2023-03-21 RX ORDER — PANTOPRAZOLE SODIUM 40 MG/1
40 TABLET, DELAYED RELEASE ORAL DAILY
Qty: 90 TABLET | Refills: 3 | Status: SHIPPED | OUTPATIENT
Start: 2023-03-21

## 2023-03-21 RX ORDER — METFORMIN HYDROCHLORIDE 500 MG/1
TABLET, EXTENDED RELEASE ORAL
Qty: 180 TABLET | Refills: 1 | Status: SHIPPED | OUTPATIENT
Start: 2023-03-21

## 2023-03-21 RX ORDER — GLIMEPIRIDE 2 MG/1
2 TABLET ORAL 2 TIMES DAILY
Qty: 180 TABLET | Refills: 1 | Status: SHIPPED | OUTPATIENT
Start: 2023-03-21

## 2023-03-21 RX ORDER — POTASSIUM CHLORIDE 1500 MG/1
20 TABLET, FILM COATED, EXTENDED RELEASE ORAL DAILY
Qty: 28 TABLET | Refills: 0 | Status: SHIPPED | OUTPATIENT
Start: 2023-03-21

## 2023-03-21 RX ORDER — DULOXETIN HYDROCHLORIDE 60 MG/1
60 CAPSULE, DELAYED RELEASE ORAL 2 TIMES DAILY
Qty: 60 CAPSULE | Refills: 11 | Status: SHIPPED | OUTPATIENT
Start: 2023-03-21

## 2023-03-21 RX ORDER — FUROSEMIDE 20 MG/1
20 TABLET ORAL DAILY
Qty: 90 TABLET | Refills: 0 | Status: SHIPPED | OUTPATIENT
Start: 2023-03-21

## 2023-03-21 RX ORDER — ATORVASTATIN CALCIUM 80 MG/1
80 TABLET, FILM COATED ORAL DAILY
Qty: 90 TABLET | Refills: 3 | Status: SHIPPED | OUTPATIENT
Start: 2023-03-21

## 2023-03-21 RX ORDER — QUETIAPINE FUMARATE 300 MG/1
600 TABLET, FILM COATED ORAL
Status: CANCELLED | OUTPATIENT
Start: 2023-03-21

## 2023-03-21 RX ORDER — METOPROLOL TARTRATE 25 MG/1
25 TABLET, FILM COATED ORAL 2 TIMES DAILY
Qty: 90 TABLET | Refills: 1 | Status: SHIPPED | OUTPATIENT
Start: 2023-03-21

## 2023-03-21 RX ORDER — CYCLOBENZAPRINE HCL 10 MG
TABLET ORAL
Qty: 90 TABLET | Refills: 0 | Status: SHIPPED | OUTPATIENT
Start: 2023-03-21

## 2023-03-21 NOTE — PROGRESS NOTES
Chief Complaint   Patient presents with    Diabetes     6 mos FUV- diabete    Labs    Medication Refill     Pt wants epipen for bee stings     1. Have you been to the ER, urgent care clinic since your last visit? Hospitalized since your last visit? No    2. Have you seen or consulted any other health care providers outside of the [address] [place] [address] since your last visit? Include any pap smears or colon screening. No    Morrie Stain  3/21/2023  Provider:   [place]:  Diabetes Report Card   1) Have you seen the eye doctor in past year?yes    2) How would you  rate your Diabetic Diet?fair   3) How well do you take care of your feet? yes   4) Do you keep your Primary Care Follow Up Appts? yes    5) Do you know your A1C goal?yes    6) Do you take your medications daily? yes    7) Do you check your blood sugars? yes    8) Have you gained weight?no       9) Do you follow an exercise program?no    10) Can you do better?no      Lab Results   Component Value Date/Time    Cholesterol, total 131 05/16/2022 12:00 AM    HDL Cholesterol 25 (L) 05/16/2022 12:00 AM    LDL, calculated 71 05/16/2022 12:00 AM    LDL, calculated 58 09/04/2019 10:08 AM    Triglyceride 207 (H) 05/16/2022 12:00 AM     Lab Results   Component Value Date/Time    Hemoglobin A1c 7.9 (H) 05/16/2022 12:00 AM    Hemoglobin A1c 7.0 (H) 08/24/2021 09:18 AM    Hemoglobin A1c 7.9 (H) 03/09/2021 12:00 AM    Glucose 162 (H) 05/16/2022 12:00 AM    Glucose (POC) 193 (H) 09/02/2011 06:15 PM    Microalb/Creat ratio (ug/mg creat.) 10 05/16/2022 12:00 AM    LDL, calculated 71 05/16/2022 12:00 AM    LDL, calculated 58 09/04/2019 10:08 AM    Creatinine (POC) 0.7 01/04/2012 03:21 PM    Creatinine 0.93 05/16/2022 12:00 AM      Lab Results   Component Value Date/Time    Microalb/Creat ratio (ug/mg creat.) 10 05/16/2022 12:00 AM      Chief Complaint   Patient presents with    Diabetes     6 mos FUV- diabete    Labs    Medication Refill     Pt wants epipen for bee stings he is a 59y.o. year old male who presents for evaluation. See Diabetic Report Card listed above. Patient Active Problem List    Diagnosis    CVA, old, hemiparesis ([facility]    Type 2 diabetes mellitus with diabetic neuropathy ([facility]    Coronary artery disease due to lipid rich plaque    Pulmonary emphysema (HCC)    Type 2 diabetes mellitus without complication, without long-term current use of insulin (HCC)    Essential hypertension, benign    Right hand pain    Pain in both feet    Peripheral neuropathy    Idiopathic peripheral neuropathy    Chronic neck pain    Cervical spondylosis    Degenerative cervical disc    Hyperlipidemia    History of MI (myocardial infarction)    RSD (reflex sympathetic dystrophy)    Paranoia (psychosis) ([facility]    Delusions of persecution    Schizoaffective disorder ([facility]       Reviewed PmHx, RxHx, FmHx, SocHx, AllgHx--dated and updated in the chart. Review of Systems - negative except as listed above in the HPI    Objective:     Vitals:    03/21/23 1400   BP: 110/71   Pulse: 69   Resp: 20   Temp: 97.8 °F (36.6 °C)   TempSrc: Oral   SpO2: 97%   Weight: 155 lb 3 oz (70.4 kg)   Height: 5' 9\" (1.753 m)         Assessment/ Plan:   Diagnoses and all orders for this visit:    1. Type 2 diabetes mellitus without complication, without long-term current use of insulin ([facility])  -     metFORMIN ER (GLUCOPHAGE XR) 500 mg tablet; TAKE 2 TABLETS BY MOUTH ONCE DAILY WITH SUPPER  -     glimepiride (AMARYL) 2 mg tablet; Take 1 Tablet by mouth two (2) times a day. -     gabapentin (NEURONTIN) 300 mg capsule; Take 1 capsule by mouth at bedtime  -     LIPID PANEL; Future  -     METABOLIC PANEL, COMPREHENSIVE; Future  -     HEMOGLOBIN A1C WITH EAG; Future  A1c not at goal, compliance is an issue here. Check labs and adjust medications. 2. Type 2 diabetes mellitus with diabetic neuropathy, without long-term current use of insulin (HCC)  -     SITagliptin phosphate (Januvia) 100 mg tablet;  Take 1 Tablet by mouth daily.  -     gabapentin (NEURONTIN) 300 mg capsule; Take 1 capsule by mouth at bedtime  -     LIPID PANEL; Future  -     METABOLIC PANEL, COMPREHENSIVE; Future  -     HEMOGLOBIN A1C WITH EAG; Future    3. Essential hypertension, benign  -     potassium chloride SR (K-TAB) 20 mEq tablet; Take 1 Tablet by mouth daily. -     furosemide (LASIX) 20 mg tablet; Take 1 Tablet by mouth daily.  -     LIPID PANEL; Future  -     METABOLIC PANEL, COMPREHENSIVE; Future  At goal  4. Degenerative cervical disc  -     ibuprofen (MOTRIN) 800 mg tablet; Take 1 Tablet by mouth every eight (8) hours as needed for Pain. -     cyclobenzaprine (FLEXERIL) 10 mg tablet; TAKE 1 TABLET BY MOUTH THREE TIMES DAILY AS NEEDED FOR MUSCLE SPASM DISCONTINUE  BACLOFEN  Increasing pain therefore add ibuprofen  5. CVA, old, hemiparesis ([facility]  -     gabapentin (NEURONTIN) 300 mg capsule; Take 1 capsule by mouth at bedtime  Stable on current gabapentin  6. Idiopathic peripheral neuropathy  -     DULoxetine (Cymbalta) 60 mg capsule; Take 1 Capsule by mouth two (2) times a day. Stable with Cymbalta  7. Pure hypercholesterolemia  -     LIPID PANEL; Future  -     METABOLIC PANEL, COMPREHENSIVE; Future    8. Schizoaffective disorder, bipolar type [facility])  Patient having advancing schizoaffective disorder and seeing psychiatry with adjustment of medications. 9. Pulmonary emphysema, unspecified emphysema type ([facility]  Discussed patient at length to discontinue smoking and risk associated with such patient is developing emphysema and is at increased risk for further complications and stroke. Other orders  -     Polyethylene Glycol 3350 powd; 1 Capsule by Does Not Apply route daily. -     pantoprazole (PROTONIX) 40 mg tablet; Take 1 Tablet by mouth daily. -     nitroglycerin (NITROSTAT) 0.4 mg SL tablet; 1 Tablet by SubLINGual route every five (5) minutes as needed for Chest Pain. Up to 3 doses.   -     multivitamin, tx-iron-ca-min (THERA-M w/ IRON) 9 mg iron-400 mcg tab tablet; Take 1 Tablet by mouth daily. -     metoprolol tartrate (LOPRESSOR) 25 mg tablet; Take 1 Tablet by mouth two (2) times a day. -     hydrOXYzine HCL (ATARAX) 10 mg tablet; Take 1 Tablet by mouth three (3) times daily as needed for Anxiety. States taking 50 mg 3 times daily  -     gemfibroziL (LOPID) 600 mg tablet; Take 1 Tablet by mouth two (2) times a day. -     clopidogreL (PLAVIX) 75 mg tab; Take 1 Tablet by mouth daily. -     atorvastatin (LIPITOR) 80 mg tablet; Take 1 Tablet by mouth daily. Follow-up and Dispositions    Return in about 6 months (around 9/21/2023). Lab Results   Component Value Date/Time    Cholesterol, total 131 05/16/2022 12:00 AM    HDL Cholesterol 25 (L) 05/16/2022 12:00 AM    LDL, calculated 71 05/16/2022 12:00 AM    LDL, calculated 58 09/04/2019 10:08 AM    Triglyceride 207 (H) 05/16/2022 12:00 AM     Lab Results   Component Value Date/Time    Hemoglobin A1c 7.9 (H) 05/16/2022 12:00 AM    Hemoglobin A1c 7.0 (H) 08/24/2021 09:18 AM    Hemoglobin A1c 7.9 (H) 03/09/2021 12:00 AM    Microalb/Creat ratio (ug/mg creat.) 10 05/16/2022 12:00 AM    LDL, calculated 71 05/16/2022 12:00 AM    LDL, calculated 58 09/04/2019 10:08 AM    Creatinine (POC) 0.7 01/04/2012 03:21 PM    Creatinine 0.93 05/16/2022 12:00 AM          Discussed with patient goal of Diabetes to include:  HgA1C <7, LDL cholesterol <100, Blood pressure <140/80. Discussed with patient diet and weight management and to get regular exercise. Recommend yearly eye exams and daily foot care. The patient understands and agrees with the plan. I have discussed the diagnosis with the patient and the intended plan as seen in the above orders. The patient has received an after-visit summary and questions were answered concerning future plans.      Medication Side Effects and Warnings were discussed with patient  Patient Labs were reviewed and or requested  Patient Past Records were reviewed and or requested    Oren Kerns M.D. [address] [facility]    There are no Patient Instructions on file for this visit.

## 2023-03-21 NOTE — PROGRESS NOTES
Chief Complaint   Patient presents with    Diabetes     6 mos FUV- diabete    Labs    Medication Refill     Pt wants epipen for bee stings     1. Have you been to the ER, urgent care clinic since your last visit? Hospitalized since your last visit? No    2. Have you seen or consulted any other health care providers outside of the [address] [place] [address] since your last visit? Include any pap smears or colon screening. No    Gustabo Perry  3/21/2023  Provider:   [place]:  Diabetes Report Card   1) Have you seen the eye doctor in past year?yes    2) How would you  rate your Diabetic Diet?fair   3) How well do you take care of your feet? yes   4) Do you keep your Primary Care Follow Up Appts? yes    5) Do you know your A1C goal?yes    6) Do you take your medications daily? yes    7) Do you check your blood sugars? yes    8) Have you gained weight?no       9) Do you follow an exercise program?no    10) Can you do better?no      Lab Results   Component Value Date/Time    Cholesterol, total 131 05/16/2022 12:00 AM    HDL Cholesterol 25 (L) 05/16/2022 12:00 AM    LDL, calculated 71 05/16/2022 12:00 AM    LDL, calculated 58 09/04/2019 10:08 AM    Triglyceride 207 (H) 05/16/2022 12:00 AM     Lab Results   Component Value Date/Time    Hemoglobin A1c 7.9 (H) 05/16/2022 12:00 AM    Hemoglobin A1c 7.0 (H) 08/24/2021 09:18 AM    Hemoglobin A1c 7.9 (H) 03/09/2021 12:00 AM    Glucose 162 (H) 05/16/2022 12:00 AM    Glucose (POC) 193 (H) 09/02/2011 06:15 PM    Microalb/Creat ratio (ug/mg creat.) 10 05/16/2022 12:00 AM    LDL, calculated 71 05/16/2022 12:00 AM    LDL, calculated 58 09/04/2019 10:08 AM    Creatinine (POC) 0.7 01/04/2012 03:21 PM    Creatinine 0.93 05/16/2022 12:00 AM

## 2023-03-22 LAB
ALBUMIN SERPL-MCNC: 4 G/DL (ref 3.5–5)
ALBUMIN/GLOB SERPL: 1.3 (ref 1.1–2.2)
ALP SERPL-CCNC: 118 U/L (ref 45–117)
ALT SERPL-CCNC: 16 U/L (ref 12–78)
ANION GAP SERPL CALC-SCNC: 6 MMOL/L (ref 5–15)
AST SERPL-CCNC: 12 U/L (ref 15–37)
BILIRUB SERPL-MCNC: 0.3 MG/DL (ref 0.2–1)
BUN SERPL-MCNC: 20 MG/DL (ref 6–20)
BUN/CREAT SERPL: 17 (ref 12–20)
CALCIUM SERPL-MCNC: 9.5 MG/DL (ref 8.5–10.1)
CHLORIDE SERPL-SCNC: 106 MMOL/L (ref 97–108)
CHOLEST SERPL-MCNC: 117 MG/DL
CO2 SERPL-SCNC: 26 MMOL/L (ref 21–32)
CREAT SERPL-MCNC: 1.18 MG/DL (ref 0.7–1.3)
EST. AVERAGE GLUCOSE BLD GHB EST-MCNC: 177 MG/DL
GLOBULIN SER CALC-MCNC: 3.2 G/DL (ref 2–4)
GLUCOSE SERPL-MCNC: 127 MG/DL (ref 65–100)
HBA1C MFR BLD: 7.8 % (ref 4–5.6)
HDLC SERPL-MCNC: 36 MG/DL
HDLC SERPL: 3.3 (ref 0–5)
LDLC SERPL CALC-MCNC: 56.6 MG/DL (ref 0–100)
POTASSIUM SERPL-SCNC: 4.2 MMOL/L (ref 3.5–5.1)
PROT SERPL-MCNC: 7.2 G/DL (ref 6.4–8.2)
SODIUM SERPL-SCNC: 138 MMOL/L (ref 136–145)
TRIGL SERPL-MCNC: 122 MG/DL (ref ?–150)
VLDLC SERPL CALC-MCNC: 24.4 MG/DL

## 2023-03-22 NOTE — PROGRESS NOTES
A1c is not at goal.  Would recommend increasing metformin to 3 tablets along with his other medications. Let me know and I will call in a higher dose and lets recheck in 3 months.   Dr. Beatriz Saul

## 2023-03-29 DIAGNOSIS — E11.9 TYPE 2 DIABETES MELLITUS WITHOUT COMPLICATION, WITHOUT LONG-TERM CURRENT USE OF INSULIN (HCC): ICD-10-CM

## 2023-03-29 RX ORDER — EPINEPHRINE 0.3 MG/.3ML
0.3 INJECTION SUBCUTANEOUS
Qty: 2 EACH | Refills: 3 | Status: SHIPPED | OUTPATIENT
Start: 2023-03-29 | End: 2023-03-29

## 2023-03-29 RX ORDER — METFORMIN HYDROCHLORIDE 500 MG/1
TABLET, EXTENDED RELEASE ORAL
Qty: 270 TABLET | Refills: 1 | Status: SHIPPED | OUTPATIENT
Start: 2023-03-29

## 2023-05-07 RX ORDER — LANCETS 30 GAUGE
EACH MISCELLANEOUS
COMMUNITY
Start: 2019-09-04

## 2023-05-23 RX ORDER — POTASSIUM CHLORIDE 1500 MG/1
TABLET, FILM COATED, EXTENDED RELEASE ORAL
Qty: 28 TABLET | Refills: 3 | Status: SHIPPED | OUTPATIENT
Start: 2023-05-23

## 2023-05-23 RX ORDER — CYCLOBENZAPRINE HCL 10 MG
TABLET ORAL
Qty: 90 TABLET | Refills: 1 | Status: SHIPPED | OUTPATIENT
Start: 2023-05-23

## 2023-06-21 LAB — HBA1C MFR BLD HPLC: 6.4 %

## 2023-06-23 ENCOUNTER — CLINICAL DOCUMENTATION (OUTPATIENT)
Age: 58
End: 2023-06-23

## 2023-06-28 ENCOUNTER — TELEPHONE (OUTPATIENT)
Age: 58
End: 2023-06-28

## 2023-06-28 NOTE — TELEPHONE ENCOUNTER
Jeannine Alvarez NP from [employer] Call wanted to let you know that quantoflo test was done  & came back with mild impairment for lt circulation testing. Lt side 0.77 & rt side 0.91.  Lita Chatman NP can be reached if needed @[phone]

## 2023-07-06 RX ORDER — FUROSEMIDE 20 MG/1
TABLET ORAL
Qty: 30 TABLET | Refills: 11 | Status: SHIPPED | OUTPATIENT
Start: 2023-07-06

## 2023-07-06 RX ORDER — METFORMIN HYDROCHLORIDE 500 MG/1
TABLET, EXTENDED RELEASE ORAL
Qty: 90 TABLET | Refills: 11 | Status: SHIPPED | OUTPATIENT
Start: 2023-07-06

## 2023-07-10 ENCOUNTER — TELEPHONE (OUTPATIENT)
Age: 58
End: 2023-07-10

## 2023-07-10 NOTE — TELEPHONE ENCOUNTER
We received a fax refill request for Jesus Eastman.  Please escribe Metoprolol Tartrate to their pharmacy. The pharmacy is correct in the chart and they are requesting a 90 day supply.

## 2023-07-11 ENCOUNTER — TELEPHONE (OUTPATIENT)
Age: 58
End: 2023-07-11

## 2023-07-14 RX ORDER — CYCLOBENZAPRINE HCL 10 MG
TABLET ORAL
Qty: 90 TABLET | Refills: 11 | Status: SHIPPED | OUTPATIENT
Start: 2023-07-14

## 2023-07-19 RX ORDER — CYCLOBENZAPRINE HCL 10 MG
TABLET ORAL
Qty: 90 TABLET | Refills: 11 | OUTPATIENT
Start: 2023-07-19

## 2023-07-20 RX ORDER — CYCLOBENZAPRINE HCL 10 MG
TABLET ORAL
Qty: 90 TABLET | Refills: 11 | OUTPATIENT
Start: 2023-07-20

## 2023-07-24 RX ORDER — CYCLOBENZAPRINE HCL 10 MG
TABLET ORAL
Qty: 90 TABLET | Refills: 11 | OUTPATIENT
Start: 2023-07-24

## 2023-07-25 ENCOUNTER — CLINICAL DOCUMENTATION (OUTPATIENT)
Age: 58
End: 2023-07-25

## 2023-07-25 NOTE — PROGRESS NOTES
DivvyDose request was put on [facility] desk to process

## 2023-08-08 ENCOUNTER — CLINICAL DOCUMENTATION (OUTPATIENT)
Age: 58
End: 2023-08-08

## 2023-08-08 RX ORDER — CLOPIDOGREL BISULFATE 75 MG/1
TABLET ORAL
Qty: 30 TABLET | Refills: 11 | Status: SHIPPED | OUTPATIENT
Start: 2023-08-08

## 2023-08-08 RX ORDER — SITAGLIPTIN 100 MG/1
TABLET, FILM COATED ORAL
Qty: 30 TABLET | Refills: 11 | Status: SHIPPED | OUTPATIENT
Start: 2023-08-08

## 2023-08-08 RX ORDER — EPINEPHRINE 0.3 MG/.3ML
INJECTION SUBCUTANEOUS
Qty: 2 ML | Refills: 11 | Status: SHIPPED | OUTPATIENT
Start: 2023-08-08

## 2023-08-08 RX ORDER — NITROGLYCERIN 0.4 MG/1
TABLET SUBLINGUAL
Qty: 25 TABLET | Refills: 11 | Status: SHIPPED | OUTPATIENT
Start: 2023-08-08

## 2023-08-10 ENCOUNTER — CLINICAL DOCUMENTATION (OUTPATIENT)
Age: 58
End: 2023-08-10

## 2023-08-10 NOTE — PROGRESS NOTES
[facility] request & last office note was faxed to [phone],ok,Copy placed in scan folder to be scanned to chart.

## 2023-08-14 ENCOUNTER — TELEPHONE (OUTPATIENT)
Age: 58
End: 2023-08-14

## 2023-08-14 NOTE — TELEPHONE ENCOUNTER
We received a fax refill request for Jesus Eastman.  Please escribe Freestyle Marvel 2 Sensor to their pharmacy. The pharmacy is correct in the chart and they are requesting a ? day supply.

## 2023-08-30 ENCOUNTER — TELEPHONE (OUTPATIENT)
Age: 58
End: 2023-08-30

## 2023-08-30 RX ORDER — PANTOPRAZOLE SODIUM 40 MG/1
40 TABLET, DELAYED RELEASE ORAL DAILY
Qty: 90 TABLET | Refills: 1 | Status: SHIPPED | OUTPATIENT
Start: 2023-08-30

## 2023-08-30 RX ORDER — CLOPIDOGREL BISULFATE 75 MG/1
75 TABLET ORAL DAILY
Qty: 90 TABLET | Refills: 1 | Status: SHIPPED | OUTPATIENT
Start: 2023-08-30

## 2023-08-30 NOTE — TELEPHONE ENCOUNTER
We received a fax refill request for Jesus KIRK Jaren.  Please escribe pantoprazole (PROTONIX) 40 MG tablet to their pharmacy. The pharmacy is correct in the chart and they are requesting a 90 day supply. We received a fax refill request for Jesus A Jaren.  Please escribe clopidogrel (PLAVIX) 75 MG tablet to their pharmacy. The pharmacy is correct in the chart and they are requesting a 90 day supply. Pt has an apt with you on 9.25.23.

## 2023-09-07 ENCOUNTER — CLINICAL DOCUMENTATION (OUTPATIENT)
Age: 58
End: 2023-09-07

## 2023-09-11 ENCOUNTER — CLINICAL DOCUMENTATION (OUTPATIENT)
Age: 58
End: 2023-09-11

## 2023-09-25 ENCOUNTER — OFFICE VISIT (OUTPATIENT)
Age: 58
End: 2023-09-25
Payer: MEDICAID

## 2023-09-25 VITALS
SYSTOLIC BLOOD PRESSURE: 111 MMHG | WEIGHT: 155 LBS | DIASTOLIC BLOOD PRESSURE: 64 MMHG | TEMPERATURE: 97.7 F | HEART RATE: 68 BPM | HEIGHT: 69 IN | BODY MASS INDEX: 22.96 KG/M2 | RESPIRATION RATE: 18 BRPM | OXYGEN SATURATION: 94 %

## 2023-09-25 DIAGNOSIS — I10 ESSENTIAL HYPERTENSION, BENIGN: ICD-10-CM

## 2023-09-25 DIAGNOSIS — E78.5 HYPERLIPIDEMIA, UNSPECIFIED HYPERLIPIDEMIA TYPE: ICD-10-CM

## 2023-09-25 DIAGNOSIS — E11.40 TYPE 2 DIABETES MELLITUS WITH DIABETIC NEUROPATHY, WITHOUT LONG-TERM CURRENT USE OF INSULIN (HCC): Primary | ICD-10-CM

## 2023-09-25 DIAGNOSIS — H10.9 CONJUNCTIVITIS OF LEFT EYE, UNSPECIFIED CONJUNCTIVITIS TYPE: ICD-10-CM

## 2023-09-25 PROCEDURE — 3078F DIAST BP <80 MM HG: CPT | Performed by: FAMILY MEDICINE

## 2023-09-25 PROCEDURE — 99214 OFFICE O/P EST MOD 30 MIN: CPT | Performed by: FAMILY MEDICINE

## 2023-09-25 PROCEDURE — 3051F HG A1C>EQUAL 7.0%<8.0%: CPT | Performed by: FAMILY MEDICINE

## 2023-09-25 PROCEDURE — 3074F SYST BP LT 130 MM HG: CPT | Performed by: FAMILY MEDICINE

## 2023-09-25 RX ORDER — CIPROFLOXACIN HYDROCHLORIDE 3.5 MG/ML
1 SOLUTION/ DROPS TOPICAL
Qty: 5 ML | Refills: 0 | Status: SHIPPED | OUTPATIENT
Start: 2023-09-25 | End: 2023-10-05

## 2023-09-25 NOTE — PROGRESS NOTES
Chief Complaint   Patient presents with    Diabetes    Hypertension    Lab Collection     Fasting today    Conjunctivitis     Left eye:  Started this am.     1. Have you been to the ER, urgent care clinic since your last visit? Hospitalized since your last visit? No    2. Have you seen or consulted any other health care providers outside of the [address] [place] [address] Avenue since your last visit? Include any pap smears or colon screening. Yes Where: [facility]: Neurology      Jesus Eastman  9/25/2023  Provider:   [place]:  Diabetes Report Card   1) Have you seen the eye doctor in past year?no    2) How would you  rate your Diabetic Diet? Good   3) How well do you take care of your feet? Self care   4) Do you keep your Primary Care Follow Up Appts? yes    5) Do you know your A1C goal?yes    6) Do you take your medications daily? yes    7) Do you check your blood sugars? yes    8) Have you gained weight?no       9) Do you follow an exercise program?no    10) Can you do better?no      Hemoglobin A1C   Date Value Ref Range Status   03/21/2023 7.8 (H) 4.0 - 5.6 % Final     Comment:     NEW METHOD  PLEASE NOTE NEW REFERENCE RANGE  (NOTE)  HbA1C Interpretive Ranges  <5.7              Normal  5.7 - 6.4         Consider Prediabetes  >6.5              Consider Diabetes         Subjective:     Caesar Doherty is a 59 y.o. male seen for follow up of diabetes. He also has hypertension and hyperlipidemia. See Diabetic Report Card Above. Complete PmHX, Sochx, RxHx, Labs all reviewed in the chart.     Past Medical History:   Diagnosis Date    CAD (coronary artery disease) 2003    MI    Cervical spondylosis 6/26/2014    Chronic pain     Chronic pain     Left Shoulder pain    Coronary artery disease due to lipid rich plaque 8/23/2018    Hypertension     MI (myocardial infarction) [facility])     age 43    Other ill-defined conditions(799.89)     reflux sympathetic dystrophy    Psychiatric disorder     Major Depression/Anxiety    Psychotic

## 2023-09-25 NOTE — PROGRESS NOTES
Chief Complaint   Patient presents with    Diabetes    Hypertension    Lab Collection     Fasting today    Conjunctivitis     Left eye:  Started this am.     1. Have you been to the ER, urgent care clinic since your last visit? Hospitalized since your last visit? No    2. Have you seen or consulted any other health care providers outside of the [address] [place] [address] Avenue since your last visit? Include any pap smears or colon screening. Yes Where: [facility]: Neurology      Jesus Eastman  9/25/2023  Provider:   [place]:  Diabetes Report Card   1) Have you seen the eye doctor in past year?no    2) How would you  rate your Diabetic Diet? Good   3) How well do you take care of your feet? Self care   4) Do you keep your Primary Care Follow Up Appts? yes    5) Do you know your A1C goal?yes    6) Do you take your medications daily? yes    7) Do you check your blood sugars? yes    8) Have you gained weight?no       9) Do you follow an exercise program?no    10) Can you do better?no      Hemoglobin A1C   Date Value Ref Range Status   03/21/2023 7.8 (H) 4.0 - 5.6 % Final     Comment:     NEW METHOD  PLEASE NOTE NEW REFERENCE RANGE  (NOTE)  HbA1C Interpretive Ranges  <5.7              Normal  5.7 - 6.4         Consider Prediabetes  >6.5              Consider Diabetes

## 2023-09-26 LAB
ALBUMIN SERPL-MCNC: 4.3 G/DL (ref 3.5–5)
ALBUMIN/GLOB SERPL: 1.4 (ref 1.1–2.2)
ALP SERPL-CCNC: 97 U/L (ref 45–117)
ALT SERPL-CCNC: 31 U/L (ref 12–78)
ANION GAP SERPL CALC-SCNC: 3 MMOL/L (ref 5–15)
AST SERPL-CCNC: 13 U/L (ref 15–37)
BASOPHILS # BLD: 0 K/UL (ref 0–0.1)
BASOPHILS NFR BLD: 0 % (ref 0–1)
BILIRUB SERPL-MCNC: 0.3 MG/DL (ref 0.2–1)
BUN SERPL-MCNC: 18 MG/DL (ref 6–20)
BUN/CREAT SERPL: 15 (ref 12–20)
CALCIUM SERPL-MCNC: 9.4 MG/DL (ref 8.5–10.1)
CHLORIDE SERPL-SCNC: 107 MMOL/L (ref 97–108)
CHOLEST SERPL-MCNC: 109 MG/DL
CO2 SERPL-SCNC: 28 MMOL/L (ref 21–32)
CREAT SERPL-MCNC: 1.21 MG/DL (ref 0.7–1.3)
CREAT UR-MCNC: 45.4 MG/DL
DIFFERENTIAL METHOD BLD: ABNORMAL
EOSINOPHIL # BLD: 0.1 K/UL (ref 0–0.4)
EOSINOPHIL NFR BLD: 1 % (ref 0–7)
ERYTHROCYTE [DISTWIDTH] IN BLOOD BY AUTOMATED COUNT: 13.9 % (ref 11.5–14.5)
EST. AVERAGE GLUCOSE BLD GHB EST-MCNC: 143 MG/DL
GLOBULIN SER CALC-MCNC: 3.1 G/DL (ref 2–4)
GLUCOSE SERPL-MCNC: 130 MG/DL (ref 65–100)
HBA1C MFR BLD: 6.6 % (ref 4–5.6)
HCT VFR BLD AUTO: 40.5 % (ref 36.6–50.3)
HDLC SERPL-MCNC: 42 MG/DL
HDLC SERPL: 2.6 (ref 0–5)
HGB BLD-MCNC: 13.1 G/DL (ref 12.1–17)
IMM GRANULOCYTES # BLD AUTO: 0 K/UL (ref 0–0.04)
IMM GRANULOCYTES NFR BLD AUTO: 0 % (ref 0–0.5)
LDLC SERPL CALC-MCNC: 47 MG/DL (ref 0–100)
LYMPHOCYTES # BLD: 1.1 K/UL (ref 0.8–3.5)
LYMPHOCYTES NFR BLD: 15 % (ref 12–49)
MCH RBC QN AUTO: 33.1 PG (ref 26–34)
MCHC RBC AUTO-ENTMCNC: 32.3 G/DL (ref 30–36.5)
MCV RBC AUTO: 102.3 FL (ref 80–99)
MICROALBUMIN UR-MCNC: 3.25 MG/DL
MICROALBUMIN/CREAT UR-RTO: 72 MG/G (ref 0–30)
MONOCYTES # BLD: 0.4 K/UL (ref 0–1)
MONOCYTES NFR BLD: 6 % (ref 5–13)
NEUTS SEG # BLD: 5.3 K/UL (ref 1.8–8)
NEUTS SEG NFR BLD: 78 % (ref 32–75)
NRBC # BLD: 0 K/UL (ref 0–0.01)
NRBC BLD-RTO: 0 PER 100 WBC
PLATELET # BLD AUTO: 285 K/UL (ref 150–400)
PMV BLD AUTO: 9.5 FL (ref 8.9–12.9)
POTASSIUM SERPL-SCNC: 4.8 MMOL/L (ref 3.5–5.1)
PROT SERPL-MCNC: 7.4 G/DL (ref 6.4–8.2)
RBC # BLD AUTO: 3.96 M/UL (ref 4.1–5.7)
SODIUM SERPL-SCNC: 138 MMOL/L (ref 136–145)
TRIGL SERPL-MCNC: 100 MG/DL
TSH SERPL DL<=0.05 MIU/L-ACNC: 0.63 UIU/ML (ref 0.36–3.74)
VLDLC SERPL CALC-MCNC: 20 MG/DL
WBC # BLD AUTO: 6.9 K/UL (ref 4.1–11.1)

## 2023-11-06 RX ORDER — POTASSIUM CHLORIDE 20 MEQ/1
20 TABLET, EXTENDED RELEASE ORAL DAILY
Qty: 30 TABLET | Refills: 11 | Status: SHIPPED | OUTPATIENT
Start: 2023-11-06

## 2023-11-06 RX ORDER — GLIMEPIRIDE 2 MG/1
2 TABLET ORAL 2 TIMES DAILY
Qty: 60 TABLET | Refills: 11 | Status: SHIPPED | OUTPATIENT
Start: 2023-11-06

## 2023-11-06 RX ORDER — IBUPROFEN 800 MG/1
800 TABLET ORAL EVERY 8 HOURS PRN
Qty: 90 TABLET | Refills: 11 | Status: SHIPPED | OUTPATIENT
Start: 2023-11-06

## 2023-11-15 ENCOUNTER — TELEPHONE (OUTPATIENT)
Age: 58
End: 2023-11-15

## 2023-12-04 ENCOUNTER — TELEPHONE (OUTPATIENT)
Age: 58
End: 2023-12-04

## 2023-12-04 ENCOUNTER — CLINICAL DOCUMENTATION (OUTPATIENT)
Age: 58
End: 2023-12-04

## 2023-12-04 DIAGNOSIS — G62.9 PERIPHERAL POLYNEUROPATHY: Primary | ICD-10-CM

## 2023-12-04 RX ORDER — GABAPENTIN 300 MG/1
300 CAPSULE ORAL NIGHTLY
Qty: 90 CAPSULE | Refills: 3 | Status: SHIPPED | OUTPATIENT
Start: 2023-12-04 | End: 2024-12-03

## 2023-12-04 RX ORDER — GLIMEPIRIDE 2 MG/1
2 TABLET ORAL 2 TIMES DAILY
Qty: 180 TABLET | Refills: 1 | Status: SHIPPED | OUTPATIENT
Start: 2023-12-04

## 2023-12-04 RX ORDER — METFORMIN HYDROCHLORIDE 500 MG/1
TABLET, EXTENDED RELEASE ORAL
Qty: 270 TABLET | Refills: 1 | Status: SHIPPED | OUTPATIENT
Start: 2023-12-04

## 2023-12-04 NOTE — TELEPHONE ENCOUNTER
[facility] Eastman needs a refill of gabapentin (NEURONTIN) 300 MG capsule. They have 0 pills/supply left and are requesting a 90 day supply with refills. Pharmacy has been updated in the chart. Patient was advised or scheduled an appointment for the future and to request refills thru the [employer] Julia or by requesting a refill from their pharmacy in the future. Patient was also advised to check with their pharmacy for status of when refills are available.

## 2023-12-04 NOTE — TELEPHONE ENCOUNTER
Jesus Eastman needs a refill of glimepiride (AMARYL) 2 MG tablet. They have 2 pills/supply left and are requesting a 90 day supply with refills. Pharmacy has been updated in the chart. Patient was advised or scheduled an appointment for the future and to request refills thru the My[facility] or by requesting a refill from their pharmacy in the future. Patient was also advised to check with their pharmacy for status of when refills are available. Jesus Eastman needs a refill of metFORMIN (GLUCOPHAGE-XR) 500 MG extended release tablet. They have 2 pills/supply left and are requesting a 90 day supply with refills. Pharmacy has been updated in the chart. Patient was advised or scheduled an appointment for the future and to request refills thru the [facility] or by requesting a refill from their pharmacy in the future. Patient was also advised to check with their pharmacy for status of when refills are available. Jesus Eastman needs a refill of JANUVIA 100 MG tablet. They have 0 pills/supply left and are requesting a 90 day supply with refills. Pharmacy has been updated in the chart. Patient was advised or scheduled an appointment for the future and to request refills thru the [employer] [facility] or by requesting a refill from their pharmacy in the future. Patient was also advised to check with their pharmacy for status of when refills are available.

## 2023-12-06 ENCOUNTER — CLINICAL DOCUMENTATION (OUTPATIENT)
Age: 58
End: 2023-12-06

## 2023-12-06 NOTE — PROGRESS NOTES
[facility] request & last 2 office notes were faxed to [phone],ok,Copy placed in scan folder to be scanned to chart.

## 2024-01-02 ENCOUNTER — TELEPHONE (OUTPATIENT)
Age: 59
End: 2024-01-02

## 2024-01-02 NOTE — TELEPHONE ENCOUNTER
----- Message from Rach Rivera sent at 1/2/2024 11:47 AM EST -----  Subject: Referral Request    Reason for referral request? A1C  Provider patient wants to be referred to(if known):     Provider Phone Number(if known):    Additional Information for Provider? Please put orders in for A1c and   patient is also requesting a flu shot. Please call patient at.  ---------------------------------------------------------------------------  --------------  CALL BACK INFO    [phone]; OK to leave message on voicemail  ---------------------------------------------------------------------------  --------------

## 2024-01-10 ENCOUNTER — TELEPHONE (OUTPATIENT)
Age: 59
End: 2024-01-10

## 2024-01-10 RX ORDER — METFORMIN HYDROCHLORIDE 500 MG/1
TABLET, EXTENDED RELEASE ORAL
Qty: 270 TABLET | Refills: 1 | Status: SHIPPED | OUTPATIENT
Start: 2024-01-10

## 2024-01-10 NOTE — TELEPHONE ENCOUNTER
Jesus Eastman needs a refill of metFORMIN (GLUCOPHAGE-XR) 500 MG extended release tablet .  They have 10 pills/supply left and are requesting a 90 day supply with refills.  Pharmacy has been updated in the chart. Patient was advised or scheduled an appointment for the future and to request refills thru the "[employer] Julia or by requesting a refill from their pharmacy in the future.  Patient was also advised to check with their pharmacy for status of when refills are available.    Jesus Eastman needs a refill of SITagliptin (JANUVIA) 100 MG tablet .  They have 2 pills/supply left and are requesting a ? day supply with refills.  Pharmacy has been updated in the chart. Patient was advised or scheduled an appointment for the future and to request refills thru the "[employer] or by requesting a refill from their pharmacy in the future.  Patient was also advised to check with their pharmacy for status of when refills are available.

## 2024-01-22 RX ORDER — HYDROXYZINE HYDROCHLORIDE 25 MG/1
TABLET, FILM COATED ORAL
Qty: 90 TABLET | Refills: 11 | Status: SHIPPED | OUTPATIENT
Start: 2024-01-22

## 2024-01-22 RX ORDER — ATORVASTATIN CALCIUM 80 MG/1
80 TABLET, FILM COATED ORAL DAILY
Qty: 30 TABLET | Refills: 11 | Status: SHIPPED | OUTPATIENT
Start: 2024-01-22

## 2024-01-22 RX ORDER — MULTIVIT,CALC,MINS/IRON/FOLIC 9MG-400MCG
1 TABLET ORAL DAILY
Qty: 30 TABLET | Refills: 11 | Status: SHIPPED | OUTPATIENT
Start: 2024-01-22

## 2024-01-22 RX ORDER — GLIMEPIRIDE 2 MG/1
2 TABLET ORAL 2 TIMES DAILY
Qty: 180 TABLET | Refills: 0 | Status: SHIPPED | OUTPATIENT
Start: 2024-01-22

## 2024-02-05 ENCOUNTER — CLINICAL DOCUMENTATION (OUTPATIENT)
Age: 59
End: 2024-02-05

## 2024-02-06 ENCOUNTER — CLINICAL DOCUMENTATION (OUTPATIENT)
Age: 59
End: 2024-02-06

## 2024-02-06 NOTE — PROGRESS NOTES
[facility] request & office note was faxed to [phone],ok,Copy placed in scan folder to be scanned to chart.

## 2024-02-26 ENCOUNTER — OFFICE VISIT (OUTPATIENT)
Age: 59
End: 2024-02-26
Payer: MEDICAID

## 2024-02-26 VITALS
OXYGEN SATURATION: 96 % | DIASTOLIC BLOOD PRESSURE: 82 MMHG | RESPIRATION RATE: 18 BRPM | SYSTOLIC BLOOD PRESSURE: 114 MMHG | BODY MASS INDEX: 23.7 KG/M2 | TEMPERATURE: 98.3 F | WEIGHT: 160 LBS | HEIGHT: 69 IN | HEART RATE: 69 BPM

## 2024-02-26 DIAGNOSIS — E78.5 HYPERLIPIDEMIA, UNSPECIFIED HYPERLIPIDEMIA TYPE: ICD-10-CM

## 2024-02-26 DIAGNOSIS — I69.359 HEMIPLEGIA AND HEMIPARESIS FOLLOWING CEREBRAL INFARCTION AFFECTING UNSPECIFIED SIDE (HCC): ICD-10-CM

## 2024-02-26 DIAGNOSIS — E11.40 TYPE 2 DIABETES MELLITUS WITH DIABETIC NEUROPATHY, WITHOUT LONG-TERM CURRENT USE OF INSULIN (HCC): Primary | ICD-10-CM

## 2024-02-26 DIAGNOSIS — J43.9 PULMONARY EMPHYSEMA, UNSPECIFIED EMPHYSEMA TYPE (HCC): ICD-10-CM

## 2024-02-26 DIAGNOSIS — R26.89 LOSS OF BALANCE: ICD-10-CM

## 2024-02-26 DIAGNOSIS — F25.0 SCHIZOAFFECTIVE DISORDER, BIPOLAR TYPE (HCC): ICD-10-CM

## 2024-02-26 DIAGNOSIS — I10 ESSENTIAL HYPERTENSION, BENIGN: ICD-10-CM

## 2024-02-26 LAB
ALBUMIN SERPL-MCNC: 4.1 G/DL (ref 3.5–5)
ALBUMIN/GLOB SERPL: 1.4 (ref 1.1–2.2)
ALP SERPL-CCNC: 104 U/L (ref 45–117)
ALT SERPL-CCNC: 27 U/L (ref 12–78)
ANION GAP SERPL CALC-SCNC: 6 MMOL/L (ref 5–15)
AST SERPL-CCNC: 10 U/L (ref 15–37)
BILIRUB SERPL-MCNC: 0.3 MG/DL (ref 0.2–1)
BUN SERPL-MCNC: 26 MG/DL (ref 6–20)
BUN/CREAT SERPL: 20 (ref 12–20)
CALCIUM SERPL-MCNC: 9.6 MG/DL (ref 8.5–10.1)
CHLORIDE SERPL-SCNC: 106 MMOL/L (ref 97–108)
CHOLEST SERPL-MCNC: 156 MG/DL
CO2 SERPL-SCNC: 25 MMOL/L (ref 21–32)
CREAT SERPL-MCNC: 1.28 MG/DL (ref 0.7–1.3)
EST. AVERAGE GLUCOSE BLD GHB EST-MCNC: 206 MG/DL
GLOBULIN SER CALC-MCNC: 3 G/DL (ref 2–4)
GLUCOSE SERPL-MCNC: 279 MG/DL (ref 65–100)
HBA1C MFR BLD: 8.8 % (ref 4–5.6)
HDLC SERPL-MCNC: 35 MG/DL
HDLC SERPL: 4.5 (ref 0–5)
LDLC SERPL CALC-MCNC: 78.8 MG/DL (ref 0–100)
POTASSIUM SERPL-SCNC: 4.7 MMOL/L (ref 3.5–5.1)
PROT SERPL-MCNC: 7.1 G/DL (ref 6.4–8.2)
SODIUM SERPL-SCNC: 137 MMOL/L (ref 136–145)
TRIGL SERPL-MCNC: 211 MG/DL
VLDLC SERPL CALC-MCNC: 42.2 MG/DL

## 2024-02-26 PROCEDURE — 99214 OFFICE O/P EST MOD 30 MIN: CPT | Performed by: FAMILY MEDICINE

## 2024-02-26 PROCEDURE — 3074F SYST BP LT 130 MM HG: CPT | Performed by: FAMILY MEDICINE

## 2024-02-26 PROCEDURE — 3079F DIAST BP 80-89 MM HG: CPT | Performed by: FAMILY MEDICINE

## 2024-02-26 ASSESSMENT — PATIENT HEALTH QUESTIONNAIRE - PHQ9
SUM OF ALL RESPONSES TO PHQ QUESTIONS 1-9: 0
2. FEELING DOWN, DEPRESSED OR HOPELESS: 0
1. LITTLE INTEREST OR PLEASURE IN DOING THINGS: 0
SUM OF ALL RESPONSES TO PHQ QUESTIONS 1-9: 0
SUM OF ALL RESPONSES TO PHQ9 QUESTIONS 1 & 2: 0

## 2024-02-26 NOTE — PROGRESS NOTES
Chief Complaint   Patient presents with    Diabetes     fasting    Neck Pain     Cracks when he moves it, but it sounds like it is in his jaw. This has been happening about 4 weeks. 4/10 pain    face to face for shower chair.      Patient states a few weeks ago , he fell half way out of shower, lost balance, fell towards left flacid side, and couldn't reach out to steady himself. Patient would benefit from a shower chair for safety due to hx. Cva, left side flacid.     \"Have you been to the ER, urgent care clinic since your last visit?  Hospitalized since your last visit?\"    NO    “Have you seen or consulted any other health care providers outside of [facility] since your last visit?”    NO    “Have you had a colorectal cancer screening such as a colonoscopy/FIT/Cologuard?    YES - Type: Colonoscopy - Where: [facility] Nurse/CMA to request most recent records if not in the chart due 2026      Jesus YELENA Eastman  2/26/2024  Provider:   Question[place]:  Diabetes Report Card   1) Have you seen the eye doctor in past year?yes    2) How would you  rate your Diabetic Diet?good   3) How well do you take care of your feet?well   4) Do you keep your Primary Care Follow Up Appts?yes    5) Do you know your A1C goal?yes    6) Do you take your medications daily?yes    7) Do you check your blood sugars?yes    8) Have you gained weight?no       9) Do you follow an exercise program?no    10) Can you do better?yes      Hemoglobin A1C   Date Value Ref Range Status   09/25/2023 6.6 (H) 4.0 - 5.6 % Final     Comment:     (NOTE)  HbA1C Interpretive Ranges  <5.7              Normal  5.7 - 6.4         Consider Prediabetes  >6.5              Consider Diabetes

## 2024-02-26 NOTE — PROGRESS NOTES
Chief Complaint   Patient presents with    Diabetes     fasting    Neck Pain     Cracks when he moves it, but it sounds like it is in his jaw. This has been happening about 4 weeks. 4/10 pain    face to face for shower chair.      Patient states a few weeks ago , he fell half way out of shower, lost balance, fell towards left flacid side, and couldn't reach out to steady himself. Patient would benefit from a shower chair for safety due to hx. Cva, left side flacid.     \"Have you been to the ER, urgent care clinic since your last visit?  Hospitalized since your last visit?\"    NO    “Have you seen or consulted any other health care providers outside of [facility] since your last visit?”    NO    “Have you had a colorectal cancer screening such as a colonoscopy/FIT/Cologuard?    YES - Type: Colonoscopy - Where: [facility] Nurse/CMA to request most recent records if not in the chart due 2026      Jesus Eastman  2/26/2024  Provider:   Questionaire:  Diabetes Report Card   1) Have you seen the eye doctor in past year?yes    2) How would you  rate your Diabetic Diet?good   3) How well do you take care of your feet?well   4) Do you keep your Primary Care Follow Up Appts?yes    5) Do you know your A1C goal?yes    6) Do you take your medications daily?yes    7) Do you check your blood sugars?yes    8) Have you gained weight?no       9) Do you follow an exercise program?no    10) Can you do better?yes      Hemoglobin A1C   Date Value Ref Range Status   09/25/2023 6.6 (H) 4.0 - 5.6 % Final     Comment:     (NOTE)  HbA1C Interpretive Ranges  <5.7              Normal  5.7 - 6.4         Consider Prediabetes  >6.5              Consider Diabetes           Subjective:     Jesus Eastman is a 58 y.o. male seen for follow up of diabetes.   He also has hypertension and hyperlipidemia.    See Diabetic Report Card Above.    Complete PmHX, Sochx, RxHx, Labs all reviewed in the chart.    Past Medical History:   Diagnosis Date

## 2024-02-29 ENCOUNTER — TELEPHONE (OUTPATIENT)
Age: 59
End: 2024-02-29

## 2024-02-29 NOTE — TELEPHONE ENCOUNTER
Pt needs a shower chair pt said he talked with  about this and who would be ordering the shower chair

## 2024-03-14 ENCOUNTER — TELEPHONE (OUTPATIENT)
Age: 59
End: 2024-03-14

## 2024-03-14 NOTE — TELEPHONE ENCOUNTER
Pt called in and is wondering if you could please send in a rx for him to have a meter to stick his finger to check his sugar? He needs the lancets, acholol swaps, needles & needles also please.     Jesus Eastman needs a refill of Multiple Vitamins-Minerals (THEREMS-M) TABS .  They have 0 pills/supply left and are requesting a 90 day supply with refills.  Pharmacy has been updated in the chart. Patient was advised or scheduled an appointment for the future and to request refills thru the [employer] [facility] or by requesting a refill from their pharmacy in the future.  Patient was also advised to check with their pharmacy for status of when refills are available.

## 2024-03-15 ENCOUNTER — TELEPHONE (OUTPATIENT)
Age: 59
End: 2024-03-15

## 2024-03-15 RX ORDER — MULTIVIT,CALC,MINS/IRON/FOLIC 9MG-400MCG
1 TABLET ORAL DAILY
Qty: 30 TABLET | Refills: 11 | Status: SHIPPED | OUTPATIENT
Start: 2024-03-15

## 2024-03-15 NOTE — TELEPHONE ENCOUNTER
Glucometer, test strips, alcohol pads and lancets faxed to [facility] as requested at [phone]. Confirmed.

## 2024-03-15 NOTE — TELEPHONE ENCOUNTER
Pt called and stated that the pharmacy told him that his multi-vitamin is on back order and he would like to have the RX sent to [facility] on [address].    Pt would also like a RX sent in to [facility] for his glucose meter, lancets, test strips and alcohol wipes    Pt can be reached at [phone] if necessary

## 2024-03-20 RX ORDER — MULTIVIT,CALC,MINS/IRON/FOLIC 9MG-400MCG
1 TABLET ORAL DAILY
Qty: 30 TABLET | Refills: 11 | Status: SHIPPED | OUTPATIENT
Start: 2024-03-20

## 2024-03-20 NOTE — TELEPHONE ENCOUNTER
Patient is following up with Vitamins requested to go to [facility]. They are on backorder so he is requesting they be sent to [facility] on file. Patient's phone: [phone]

## 2024-03-20 NOTE — TELEPHONE ENCOUNTER
Vitamens sent to [facility] as requested. Orders faxed for glucose monitor, test strips, lancets and alcohol faxed to [facility] as requested. Confirmed.

## 2024-04-11 ENCOUNTER — CLINICAL DOCUMENTATION (OUTPATIENT)
Age: 59
End: 2024-04-11

## 2024-04-11 ENCOUNTER — TELEPHONE (OUTPATIENT)
Age: 59
End: 2024-04-11

## 2024-04-11 NOTE — TELEPHONE ENCOUNTER
Called and LVM for Patient.(Jesus)   Requested call back to office for follow up on Shower Chair order.    Order resent via [place]. Order form downloaded, signed and faxed to: [phone].  Confirmation received.

## 2024-04-16 ENCOUNTER — CLINICAL DOCUMENTATION (OUTPATIENT)
Age: 59
End: 2024-04-16

## 2024-04-16 NOTE — PROGRESS NOTES
[employer] CMN for bath bench was signed 7 faxed to [phone],ok,Copy placed in scan folder to be scanned to chart.     [phone]

## 2024-05-01 DIAGNOSIS — G62.9 PERIPHERAL POLYNEUROPATHY: ICD-10-CM

## 2024-05-01 RX ORDER — GABAPENTIN 300 MG/1
300 CAPSULE ORAL NIGHTLY
Qty: 90 CAPSULE | Refills: 0 | Status: SHIPPED | OUTPATIENT
Start: 2024-05-01 | End: 2024-07-30

## 2024-05-07 ENCOUNTER — CLINICAL DOCUMENTATION (OUTPATIENT)
Age: 59
End: 2024-05-07

## 2024-05-14 ENCOUNTER — TELEPHONE (OUTPATIENT)
Age: 59
End: 2024-05-14

## 2024-05-14 NOTE — TELEPHONE ENCOUNTER
Called pt to make him an apt and he stated all he needed to do was talk to you to get a referral, asking for you to give him a call. Thanks.

## 2024-05-14 NOTE — TELEPHONE ENCOUNTER
----- Message from Melva Bui-PRAFUL Uribe sent at 5/14/2024  2:17 PM EDT -----  Subject: Appointment Request    Reason for Call: Established Patient Appointment needed: Routine Existing   Condition Follow Up    QUESTIONS    Reason for appointment request? No appointments available during search     Additional Information for Provider? Requesting appt for check up and   referral to podiatry. Please call patient.   ---------------------------------------------------------------------------  --------------  CALL BACK INFO  [phone]; OK to leave message on voicemail  ---------------------------------------------------------------------------  --------------  SCRIPT ANSWERS

## 2024-07-18 RX ORDER — CYCLOBENZAPRINE HCL 10 MG
10 TABLET ORAL 3 TIMES DAILY PRN
Qty: 90 TABLET | Refills: 0 | Status: SHIPPED | OUTPATIENT
Start: 2024-07-18

## 2024-08-14 RX ORDER — SITAGLIPTIN 100 MG/1
100 TABLET, FILM COATED ORAL DAILY
Qty: 90 TABLET | Refills: 0 | Status: SHIPPED | OUTPATIENT
Start: 2024-08-14

## 2024-08-19 ENCOUNTER — CLINICAL DOCUMENTATION (OUTPATIENT)
Age: 59
End: 2024-08-19

## 2024-08-27 RX ORDER — GEMFIBROZIL 600 MG/1
600 TABLET, FILM COATED ORAL 2 TIMES DAILY
Qty: 180 TABLET | Refills: 0 | Status: SHIPPED | OUTPATIENT
Start: 2024-08-27

## 2024-09-10 RX ORDER — METFORMIN HCL 500 MG
TABLET, EXTENDED RELEASE 24 HR ORAL
Qty: 213 TABLET | Refills: 0 | Status: SHIPPED | OUTPATIENT
Start: 2024-09-10

## 2024-09-12 RX ORDER — CYCLOBENZAPRINE HCL 10 MG
10 TABLET ORAL 3 TIMES DAILY PRN
Qty: 90 TABLET | Refills: 0 | Status: SHIPPED | OUTPATIENT
Start: 2024-09-12

## 2024-10-21 ENCOUNTER — APPOINTMENT (OUTPATIENT)
Facility: HOSPITAL | Age: 59
End: 2024-10-21
Payer: MEDICAID

## 2024-10-21 ENCOUNTER — HOSPITAL ENCOUNTER (EMERGENCY)
Facility: HOSPITAL | Age: 59
Discharge: HOME | End: 2024-10-21
Attending: EMERGENCY MEDICINE
Payer: MEDICAID

## 2024-10-21 VITALS
SYSTOLIC BLOOD PRESSURE: 150 MMHG | RESPIRATION RATE: 16 BRPM | HEART RATE: 78 BPM | BODY MASS INDEX: 25.01 KG/M2 | DIASTOLIC BLOOD PRESSURE: 72 MMHG | OXYGEN SATURATION: 97 % | HEIGHT: 68 IN | WEIGHT: 165 LBS | TEMPERATURE: 97.8 F

## 2024-10-21 DIAGNOSIS — S46.912A LEFT SHOULDER STRAIN, INITIAL ENCOUNTER: Primary | ICD-10-CM

## 2024-10-21 PROCEDURE — 73030 X-RAY EXAM OF SHOULDER: CPT

## 2024-10-21 PROCEDURE — 99283 EMERGENCY DEPT VISIT LOW MDM: CPT

## 2024-10-21 RX ORDER — CYCLOBENZAPRINE HCL 10 MG
10 TABLET ORAL 3 TIMES DAILY PRN
Qty: 90 TABLET | Refills: 0 | Status: SHIPPED | OUTPATIENT
Start: 2024-10-21

## 2024-10-21 ASSESSMENT — LIFESTYLE VARIABLES
HOW MANY STANDARD DRINKS CONTAINING ALCOHOL DO YOU HAVE ON A TYPICAL DAY: PATIENT DOES NOT DRINK
HOW OFTEN DO YOU HAVE A DRINK CONTAINING ALCOHOL: NEVER

## 2024-10-21 ASSESSMENT — PAIN - FUNCTIONAL ASSESSMENT: PAIN_FUNCTIONAL_ASSESSMENT: 0-10

## 2024-10-21 ASSESSMENT — PAIN SCALES - GENERAL: PAINLEVEL_OUTOF10: 5

## 2024-10-21 NOTE — DISCHARGE INSTRUCTIONS
Return for new or worsening symptoms.  Your x-ray did not show any abnormalities with the bones-this could be an injury of some of the soft tissues around the shoulder such as the muscles, ligaments, tendons.  Follow RICE instructions.  If pain continues, make a follow-up appointment with Ortho.

## 2024-10-21 NOTE — ED TRIAGE NOTES
Pt arrives POV c/o left shoulder pain. Pt states he was showering and reached up and heard it pop. Pt is able to move arm but it is painful. Pt says it is not uncommon for his left shoulder since stroke 2 years ago. Pt denies CP, SOB, N/V/D. Pt has left sided deficit and nerve pain since stroke. Pt took 800mg Ibuprofen approximately an hour ago.

## 2024-10-21 NOTE — ED PROVIDER NOTES
[facility] EMERGENCY DEPT  EMERGENCY DEPARTMENT ENCOUNTER      Pt Name: Jesus Eastman  MRN: [id number]  Birthdate 1965  Date of evaluation: 10/21/2024  Provider: REID Kapoor    CHIEF COMPLAINT       Chief Complaint   Patient presents with    Shoulder Pain         HISTORY OF PRESENT ILLNESS   (Location/Symptom, Timing/Onset, Context/Setting, Quality, Duration, Modifying Factors, Severity)  Note limiting factors.   59-year-old male, history of multiple medical issues including CAD, hypertension, chronic pain, anxiety/depression, DM presenting to the ED for left shoulder pain.  Patient reports that around 3 this afternoon he was reaching upward for something when he was in the shower, notes that he occasionally will get spasms in the left arm due to a history of a prior CVA, notes that the arm jerked upward, he reports that he felt and heard a pop in the left shoulder and started with new onset of pain over the posterior aspect of the shoulder.  Patient denies any new numbness or weakness.  Patient denies chest pain, shortness of breath, fatigue, lightheadedness.  Took 800 mg of ibuprofen with some improvement in pain.    Past medical history: Up-to-date per patient  Social history: + Smoker.    The history is provided by the patient and medical records.         Review of External Medical Records:     Nursing Notes were reviewed.    REVIEW OF SYSTEMS    (2-9 systems for level 4, 10 or more for level 5)     Review of Systems    Except as noted above the remainder of the review of systems was reviewed and negative.       PAST MEDICAL HISTORY     Past Medical History:   Diagnosis Date    CAD (coronary artery disease) 2003    MI    Cervical spondylosis 6/26/2014    Chronic pain     Chronic pain     Left Shoulder pain    Coronary artery disease due to lipid rich plaque 8/23/2018    Hypertension     MI (myocardial infarction) (HCC)     age 36    Other ill-defined conditions(799.89)     reflux sympathetic  [FreeTextEntry1] : ICON negative\par \par We discussed the importance of taking the OCP's as prescribed. \par I advised the pt that it is important to take the pill every day around the same time.\par \par I advised pt to keep a journal of her menses and to RTO in 3 months to see me.\par \par

## 2024-10-22 RX ORDER — PANTOPRAZOLE SODIUM 40 MG/1
40 TABLET, DELAYED RELEASE ORAL DAILY
Qty: 30 TABLET | Refills: 0 | Status: SHIPPED | OUTPATIENT
Start: 2024-10-22 | End: 2024-10-25 | Stop reason: SDUPTHER

## 2024-10-25 ENCOUNTER — OFFICE VISIT (OUTPATIENT)
Age: 59
End: 2024-10-25
Payer: MEDICAID

## 2024-10-25 VITALS
TEMPERATURE: 98.2 F | HEIGHT: 69 IN | SYSTOLIC BLOOD PRESSURE: 127 MMHG | HEART RATE: 79 BPM | BODY MASS INDEX: 22.66 KG/M2 | OXYGEN SATURATION: 97 % | RESPIRATION RATE: 16 BRPM | DIASTOLIC BLOOD PRESSURE: 80 MMHG | WEIGHT: 153 LBS

## 2024-10-25 DIAGNOSIS — E78.5 HYPERLIPIDEMIA, UNSPECIFIED HYPERLIPIDEMIA TYPE: ICD-10-CM

## 2024-10-25 DIAGNOSIS — I10 ESSENTIAL HYPERTENSION, BENIGN: ICD-10-CM

## 2024-10-25 DIAGNOSIS — G62.9 PERIPHERAL POLYNEUROPATHY: ICD-10-CM

## 2024-10-25 DIAGNOSIS — E11.40 TYPE 2 DIABETES MELLITUS WITH DIABETIC NEUROPATHY, WITHOUT LONG-TERM CURRENT USE OF INSULIN (HCC): ICD-10-CM

## 2024-10-25 DIAGNOSIS — I25.2 HISTORY OF MI (MYOCARDIAL INFARCTION): ICD-10-CM

## 2024-10-25 DIAGNOSIS — Z12.5 PROSTATE CANCER SCREENING: ICD-10-CM

## 2024-10-25 DIAGNOSIS — I25.10 CORONARY ARTERY DISEASE DUE TO LIPID RICH PLAQUE: ICD-10-CM

## 2024-10-25 DIAGNOSIS — F25.9 SCHIZOAFFECTIVE DISORDER, UNSPECIFIED TYPE (HCC): ICD-10-CM

## 2024-10-25 DIAGNOSIS — Z23 NEEDS FLU SHOT: ICD-10-CM

## 2024-10-25 DIAGNOSIS — Z00.00 MEDICARE ANNUAL WELLNESS VISIT, SUBSEQUENT: Primary | ICD-10-CM

## 2024-10-25 DIAGNOSIS — M25.512 ACUTE PAIN OF LEFT SHOULDER: ICD-10-CM

## 2024-10-25 DIAGNOSIS — I25.83 CORONARY ARTERY DISEASE DUE TO LIPID RICH PLAQUE: ICD-10-CM

## 2024-10-25 DIAGNOSIS — R53.83 FATIGUE, UNSPECIFIED TYPE: ICD-10-CM

## 2024-10-25 LAB
ALBUMIN SERPL-MCNC: 4.2 G/DL (ref 3.5–5)
ALBUMIN/GLOB SERPL: 1.3 (ref 1.1–2.2)
ALP SERPL-CCNC: 122 U/L (ref 45–117)
ALT SERPL-CCNC: 20 U/L (ref 12–78)
ANION GAP SERPL CALC-SCNC: 4 MMOL/L (ref 2–12)
AST SERPL-CCNC: 8 U/L (ref 15–37)
BILIRUB SERPL-MCNC: 0.3 MG/DL (ref 0.2–1)
BUN SERPL-MCNC: 18 MG/DL (ref 6–20)
BUN/CREAT SERPL: 17 (ref 12–20)
CALCIUM SERPL-MCNC: 9.4 MG/DL (ref 8.5–10.1)
CHLORIDE SERPL-SCNC: 106 MMOL/L (ref 97–108)
CHOLEST SERPL-MCNC: 110 MG/DL
CO2 SERPL-SCNC: 26 MMOL/L (ref 21–32)
CREAT SERPL-MCNC: 1.08 MG/DL (ref 0.7–1.3)
ERYTHROCYTE [DISTWIDTH] IN BLOOD BY AUTOMATED COUNT: 13.2 % (ref 11.5–14.5)
EST. AVERAGE GLUCOSE BLD GHB EST-MCNC: 237 MG/DL
GLOBULIN SER CALC-MCNC: 3.2 G/DL (ref 2–4)
GLUCOSE SERPL-MCNC: 236 MG/DL (ref 65–100)
HBA1C MFR BLD: 9.9 % (ref 4–5.6)
HCT VFR BLD AUTO: 43.1 % (ref 36.6–50.3)
HDLC SERPL-MCNC: 37 MG/DL
HDLC SERPL: 3 (ref 0–5)
HGB BLD-MCNC: 14.4 G/DL (ref 12.1–17)
LDLC SERPL CALC-MCNC: 39.2 MG/DL (ref 0–100)
MCH RBC QN AUTO: 30.8 PG (ref 26–34)
MCHC RBC AUTO-ENTMCNC: 33.4 G/DL (ref 30–36.5)
MCV RBC AUTO: 92.1 FL (ref 80–99)
NRBC # BLD: 0 K/UL (ref 0–0.01)
NRBC BLD-RTO: 0 PER 100 WBC
PLATELET # BLD AUTO: 231 K/UL (ref 150–400)
PMV BLD AUTO: 10.1 FL (ref 8.9–12.9)
POTASSIUM SERPL-SCNC: 4.7 MMOL/L (ref 3.5–5.1)
PROT SERPL-MCNC: 7.4 G/DL (ref 6.4–8.2)
PSA SERPL-MCNC: 2.2 NG/ML (ref 0.01–4)
RBC # BLD AUTO: 4.68 M/UL (ref 4.1–5.7)
SODIUM SERPL-SCNC: 136 MMOL/L (ref 136–145)
TRIGL SERPL-MCNC: 169 MG/DL
TSH SERPL DL<=0.05 MIU/L-ACNC: 0.71 UIU/ML (ref 0.36–3.74)
VLDLC SERPL CALC-MCNC: 33.8 MG/DL
WBC # BLD AUTO: 6.4 K/UL (ref 4.1–11.1)

## 2024-10-25 PROCEDURE — 90661 CCIIV3 VAC ABX FR 0.5 ML IM: CPT

## 2024-10-25 PROCEDURE — 99214 OFFICE O/P EST MOD 30 MIN: CPT

## 2024-10-25 RX ORDER — GEMFIBROZIL 600 MG/1
600 TABLET, FILM COATED ORAL 2 TIMES DAILY
Qty: 180 TABLET | Refills: 1 | Status: SHIPPED | OUTPATIENT
Start: 2024-10-25 | End: 2025-04-23

## 2024-10-25 RX ORDER — AMLODIPINE BESYLATE 2.5 MG/1
2.5 TABLET ORAL DAILY
Qty: 90 TABLET | Refills: 1 | Status: SHIPPED | OUTPATIENT
Start: 2024-10-25 | End: 2025-04-23

## 2024-10-25 RX ORDER — GABAPENTIN 300 MG/1
300 CAPSULE ORAL NIGHTLY
Qty: 90 CAPSULE | Refills: 0 | Status: SHIPPED | OUTPATIENT
Start: 2024-10-25 | End: 2025-01-23

## 2024-10-25 RX ORDER — METOPROLOL TARTRATE 25 MG/1
25 TABLET, FILM COATED ORAL 2 TIMES DAILY
Qty: 180 TABLET | Refills: 1 | Status: SHIPPED | OUTPATIENT
Start: 2024-10-25 | End: 2025-04-23

## 2024-10-25 RX ORDER — GLIMEPIRIDE 2 MG/1
2 TABLET ORAL 2 TIMES DAILY
Qty: 180 TABLET | Refills: 1 | Status: SHIPPED | OUTPATIENT
Start: 2024-10-25 | End: 2025-04-23

## 2024-10-25 RX ORDER — ATORVASTATIN CALCIUM 80 MG/1
80 TABLET, FILM COATED ORAL DAILY
Qty: 90 TABLET | Refills: 1 | Status: SHIPPED | OUTPATIENT
Start: 2024-10-25 | End: 2025-04-23

## 2024-10-25 RX ORDER — PANTOPRAZOLE SODIUM 40 MG/1
40 TABLET, DELAYED RELEASE ORAL DAILY
Qty: 90 TABLET | Refills: 1 | Status: SHIPPED | OUTPATIENT
Start: 2024-10-25 | End: 2025-04-23

## 2024-10-25 RX ORDER — DULOXETIN HYDROCHLORIDE 60 MG/1
60 CAPSULE, DELAYED RELEASE ORAL 2 TIMES DAILY
Qty: 180 CAPSULE | Refills: 1 | Status: SHIPPED | OUTPATIENT
Start: 2024-10-25 | End: 2025-04-23

## 2024-10-25 RX ORDER — METFORMIN HYDROCHLORIDE 500 MG/1
TABLET, EXTENDED RELEASE ORAL
Qty: 213 TABLET | Refills: 0 | Status: SHIPPED | OUTPATIENT
Start: 2024-10-25

## 2024-10-25 RX ORDER — FUROSEMIDE 20 MG/1
20 TABLET ORAL DAILY
Qty: 90 TABLET | Refills: 1 | Status: SHIPPED | OUTPATIENT
Start: 2024-10-25 | End: 2025-04-23

## 2024-10-25 SDOH — ECONOMIC STABILITY: FOOD INSECURITY: WITHIN THE PAST 12 MONTHS, YOU WORRIED THAT YOUR FOOD WOULD RUN OUT BEFORE YOU GOT MONEY TO BUY MORE.: NEVER TRUE

## 2024-10-25 SDOH — ECONOMIC STABILITY: FOOD INSECURITY: WITHIN THE PAST 12 MONTHS, THE FOOD YOU BOUGHT JUST DIDN'T LAST AND YOU DIDN'T HAVE MONEY TO GET MORE.: NEVER TRUE

## 2024-10-25 SDOH — ECONOMIC STABILITY: INCOME INSECURITY: HOW HARD IS IT FOR YOU TO PAY FOR THE VERY BASICS LIKE FOOD, HOUSING, MEDICAL CARE, AND HEATING?: NOT HARD AT ALL

## 2024-10-25 ASSESSMENT — ENCOUNTER SYMPTOMS
ALLERGIC/IMMUNOLOGIC NEGATIVE: 1
EYES NEGATIVE: 1
CONSTIPATION: 0
ABDOMINAL PAIN: 0
RHINORRHEA: 0
DIARRHEA: 0
SINUS PAIN: 0
COUGH: 0
SHORTNESS OF BREATH: 0
SORE THROAT: 0

## 2024-10-25 ASSESSMENT — PATIENT HEALTH QUESTIONNAIRE - PHQ9
5. POOR APPETITE OR OVEREATING: NOT AT ALL
SUM OF ALL RESPONSES TO PHQ QUESTIONS 1-9: 0
SUM OF ALL RESPONSES TO PHQ QUESTIONS 1-9: 0
3. TROUBLE FALLING OR STAYING ASLEEP: NOT AT ALL
6. FEELING BAD ABOUT YOURSELF - OR THAT YOU ARE A FAILURE OR HAVE LET YOURSELF OR YOUR FAMILY DOWN: NOT AT ALL
SUM OF ALL RESPONSES TO PHQ QUESTIONS 1-9: 0
9. THOUGHTS THAT YOU WOULD BE BETTER OFF DEAD, OR OF HURTING YOURSELF: NOT AT ALL
10. IF YOU CHECKED OFF ANY PROBLEMS, HOW DIFFICULT HAVE THESE PROBLEMS MADE IT FOR YOU TO DO YOUR WORK, TAKE CARE OF THINGS AT HOME, OR GET ALONG WITH OTHER PEOPLE: NOT DIFFICULT AT ALL
SUM OF ALL RESPONSES TO PHQ9 QUESTIONS 1 & 2: 0
4. FEELING TIRED OR HAVING LITTLE ENERGY: NOT AT ALL
1. LITTLE INTEREST OR PLEASURE IN DOING THINGS: NOT AT ALL
2. FEELING DOWN, DEPRESSED OR HOPELESS: NOT AT ALL
7. TROUBLE CONCENTRATING ON THINGS, SUCH AS READING THE NEWSPAPER OR WATCHING TELEVISION: NOT AT ALL
8. MOVING OR SPEAKING SO SLOWLY THAT OTHER PEOPLE COULD HAVE NOTICED. OR THE OPPOSITE, BEING SO FIGETY OR RESTLESS THAT YOU HAVE BEEN MOVING AROUND A LOT MORE THAN USUAL: NOT AT ALL
SUM OF ALL RESPONSES TO PHQ QUESTIONS 1-9: 0

## 2024-10-25 ASSESSMENT — LIFESTYLE VARIABLES
HOW OFTEN DO YOU HAVE A DRINK CONTAINING ALCOHOL: NEVER
HOW MANY STANDARD DRINKS CONTAINING ALCOHOL DO YOU HAVE ON A TYPICAL DAY: PATIENT DOES NOT DRINK

## 2024-10-25 NOTE — PROGRESS NOTES
Chief Complaint   Patient presents with    Medicare AWV     Patient presents in office today for AMW visit and fasting labs.  Would like to get a flu shot.  No concerns.      \"Have you been to the ER, urgent care clinic since your last visit?  Hospitalized since your last visit?\"    YES - When: approximately 10/21/24 ago.  Where and Why: [facility] ED for shoulder pain .    “Have you seen or consulted any other health care providers outside our system since your last visit?”    NO      “Have you had a colorectal cancer screening such as a colonoscopy/FIT/Cologuard?    NO    Date of last Colonoscopy: 9/9/2016  No cologuard on file  No FIT/FOBT on file   No flexible sigmoidoscopy on file     “Have you had a diabetic eye exam?”    YES - Where: [facility] Nurse/CMA to request most recent records if not in the chart     Date of last diabetic eye exam: 6/15/2018            Medicare Annual Wellness Visit    Jesus Eastman is here for Medicare AWV    Assessment & Plan  Essential hypertension, benign   Chronic, at goal (stable), continue current plan pending work up below    Orders:    Comprehensive Metabolic Panel; Future    Type 2 diabetes mellitus with diabetic neuropathy, without long-term current use of insulin (HCC)   Chronic, at goal (stable), continue current plan pending work up below    Orders:    Comprehensive Metabolic Panel; Future    Hemoglobin A1C; Future    History of MI (myocardial infarction)   Chronic, at goal (stable), continue current plan pending work up below    Orders:    Lipid Panel; Future    Coronary artery disease due to lipid rich plaque   Chronic, at goal (stable), continue current treatment plan    Orders:    Lipid Panel; Future    Hyperlipidemia, unspecified hyperlipidemia type   Chronic, at goal (stable), continue current plan pending work up below    Orders:    Lipid Panel; Future    Fatigue, unspecified type   New, uncertain prognosis, continue current plan pending work up below    Orders:    CBC;

## 2024-10-25 NOTE — PROGRESS NOTES
Chief Complaint   Patient presents with    Medicare AWV     Patient presents in office today for AMW visit and fasting labs.  Would like to get a flu shot.  No concerns.    Pt / caregiver given opportunity to review vaccine information sheet prior to vaccine administration. Opportunity given for questions and concerns. No questions or concerns at this time.      \"Have you been to the ER, urgent care clinic since your last visit?  Hospitalized since your last visit?\"    YES - When: approximately 10/21/24 ago.  Where and Why: [facility] ED for shoulder pain .    “Have you seen or consulted any other health care providers outside our system since your last visit?”    NO      “Have you had a colorectal cancer screening such as a colonoscopy/FIT/Cologuard?    NO    Date of last Colonoscopy: 9/9/2016  No cologuard on file  No FIT/FOBT on file   No flexible sigmoidoscopy on file     “Have you had a diabetic eye exam?”    YES - Where: [facility] Nurse/CMA to request most recent records if not in the chart     Date of last diabetic eye exam: 6/15/2018

## 2024-10-25 NOTE — ASSESSMENT & PLAN NOTE
Chronic, at goal (stable), continue current plan pending work up below    Orders:    Comprehensive Metabolic Panel; Future    Hemoglobin A1C; Future

## 2024-10-25 NOTE — ASSESSMENT & PLAN NOTE
Chronic, at goal (stable), continue current treatment plan    Orders:    gabapentin (NEURONTIN) 300 MG capsule; Take 1 capsule by mouth nightly for 90 days. Max Daily Amount: 300 mg

## 2024-10-25 NOTE — ASSESSMENT & PLAN NOTE
Chronic, at goal (stable), continue current plan pending work up below    Orders:    Lipid Panel; Future

## 2024-10-26 NOTE — RESULT ENCOUNTER NOTE
Notify patient via Dissolve message    Your cholesterol is at goal, continue taking gemfibrozil, atorvastatin daily and focus on following a heart healthy diet and exercising regularly as you are able.  If you have any questions about a heart healthy diet and exercise, please let me know.     Barney Eastman,    Attached are the results of your hemoglobin A1C test. As you know, this is a 3-month measurement of your blood glucose levels. This test is a much more accurate picture of your long-term sugar control, as compared to a spot glucose check. Your number was 9.9, which is higher than what we want. Our goal is to always be below 7, or as close to 7 as we can get. At this point, I recommend Pioglitazone. If you agree with this plan, please let me know and I will send in a prescription to your pharmacy. We will check on this again during our next routine follow-up.    Your alk phos is mildly elevated, this can come from the gut, bones, or liver.  We will recheck it with your next labs.  Your metabolic panel which looks at your blood sugar, electrolytes, other liver markers, and kidney function looks good.      Your PSA is in normal range.  This is a negative screening for prostate cancer.  For ongoing screening this test should be repeated annually.    Your TSH which screens for thyroid disease came back normal. This means you likely do not have hyper (high) or hypo (low) functioning thyroid.     Your CBC which looks at your white blood cells, red blood cells, and platelets came back looking normal. No sign of infection or anemia.       Sincerely,  Sung

## 2024-10-28 ENCOUNTER — CLINICAL DOCUMENTATION (OUTPATIENT)
Age: 59
End: 2024-10-28

## 2024-10-28 NOTE — PROGRESS NOTES
[employer] request was put on [facility] Sung Stokes's desk to process

## 2024-11-20 ENCOUNTER — OFFICE VISIT (OUTPATIENT)
Facility: CLINIC | Age: 59
End: 2024-11-20

## 2024-11-20 ENCOUNTER — TELEPHONE (OUTPATIENT)
Facility: CLINIC | Age: 59
End: 2024-11-20

## 2024-11-20 VITALS
BODY MASS INDEX: 22.81 KG/M2 | HEART RATE: 64 BPM | SYSTOLIC BLOOD PRESSURE: 123 MMHG | DIASTOLIC BLOOD PRESSURE: 72 MMHG | TEMPERATURE: 98.1 F | HEIGHT: 69 IN | OXYGEN SATURATION: 97 % | WEIGHT: 154 LBS | RESPIRATION RATE: 16 BRPM

## 2024-11-20 DIAGNOSIS — E11.9 TYPE 2 DIABETES MELLITUS WITHOUT COMPLICATION, WITHOUT LONG-TERM CURRENT USE OF INSULIN (HCC): ICD-10-CM

## 2024-11-20 DIAGNOSIS — M25.512 LEFT SHOULDER PAIN, UNSPECIFIED CHRONICITY: ICD-10-CM

## 2024-11-20 DIAGNOSIS — Z00.01 ENCOUNTER FOR GENERAL ADULT MEDICAL EXAMINATION WITH ABNORMAL FINDINGS: Primary | ICD-10-CM

## 2024-11-20 RX ORDER — PIOGLITAZONE 15 MG/1
15 TABLET ORAL DAILY
Qty: 30 TABLET | Refills: 3 | Status: SHIPPED | OUTPATIENT
Start: 2024-11-20

## 2024-11-20 SDOH — ECONOMIC STABILITY: FOOD INSECURITY: WITHIN THE PAST 12 MONTHS, YOU WORRIED THAT YOUR FOOD WOULD RUN OUT BEFORE YOU GOT MONEY TO BUY MORE.: SOMETIMES TRUE

## 2024-11-20 SDOH — ECONOMIC STABILITY: INCOME INSECURITY: HOW HARD IS IT FOR YOU TO PAY FOR THE VERY BASICS LIKE FOOD, HOUSING, MEDICAL CARE, AND HEATING?: NOT HARD AT ALL

## 2024-11-20 SDOH — ECONOMIC STABILITY: FOOD INSECURITY: WITHIN THE PAST 12 MONTHS, THE FOOD YOU BOUGHT JUST DIDN'T LAST AND YOU DIDN'T HAVE MONEY TO GET MORE.: SOMETIMES TRUE

## 2024-11-20 ASSESSMENT — ENCOUNTER SYMPTOMS
CONSTIPATION: 0
EYES NEGATIVE: 1
RHINORRHEA: 0
SINUS PAIN: 0
ALLERGIC/IMMUNOLOGIC NEGATIVE: 1
SORE THROAT: 0
DIARRHEA: 0
COUGH: 0
ABDOMINAL PAIN: 0
SHORTNESS OF BREATH: 0

## 2024-11-20 NOTE — PATIENT INSTRUCTIONS
Allergy medicines will work best if taken everyday.  I recommend starting with anti-histamine (xyzal, allegra, claritin, zyrtec, and their generics) and nasal steroid spray (nasacort, nasonex, flonase, and their generics).  If avoiding medicines: use warm salt gargles, especially first thing in the morning.  Also nasal saline sprays and sinus flushes, especially after being exposed to your triggers.  If things still aren't good after 2 weeks of consistent medication use, contact Sung to add Brendon.        [facility] Food Resources*  (Call [employer] [facility]/ [employer] if need more resources.)       SNAP (formerly Food [place])  What they offer: SNAP is used like cash to buy eligible food items from authorized retailers.  Apply for benefits online: https://www.My eShoe.[place].gov/  Apply for benefits by phone: [phone] (M-F 8AM - 7PM; Sat 9AM - 12PM)  [employer] Hunger Hotline  What they offer:  The [employer] Hunger Hotline connects individuals in need of food with a local food pantry or program across [address] [facility] [address] [facility] in [facility].   Website: https://Phokki/store-/ (search zip code)   Hunger Hotline Inquiry Form: https://Phokki/hunger-hotline/  Hunger Hotline Phone Number:  800-125-2500 x 631 (M-F 9:00AM-4:00PM)    Meals on Wheels  Meals on Wheels is a program that delivers meals to individuals who have no reliable means for maintaining a healthy diet.  Services are provided by [facility].     [facility] Service Area:   [facility], [place], and [place].  [facility] of [place], [place], [place], [place], [place], [place], [place], [place], [place], and [place]  Website:  https://Sparkbrowser.org/how-we-help/meals-on-wheels/  To Apply:  Ages 18-59:  Apply online: https://Sparkbrowser.org/meals-on-wheels-application-form/  Apply by phone: [phone]          Meals on Wheels  [facility] Area (continued):  To Apply:  Ages 60+:  Apply Online:

## 2024-11-20 NOTE — TELEPHONE ENCOUNTER
[facility] CSB Act team [phone]  wants you to know they will fill the Hydroxyzine 25 mg and the Cymbalta, but wants you to continue to prescribe the gabapentin

## 2024-11-20 NOTE — PROGRESS NOTES
Chief Complaint   Patient presents with    Diabetes    nasal discharge     X 1 month   Per patient: Deviated septum, jaw muscles pulling    Shoulder Pain     Left shoulder and leg since stroke: Requesting Ibuprofen and PT referral.  [employer] sessions at home.[facility]: Dr Oliveira     \"Have you been to the ER, urgent care clinic since your last visit?  Hospitalized since your last visit?\"    NO    “Have you seen or consulted any other health care providers outside our system since your last visit?”    [facility]: Dr Oliveira      “Have you had a colorectal cancer screening such as a colonoscopy/FIT/Cologuard?    NO    Date of last Colonoscopy: 9/9/2016  No cologuard on file  No FIT/FOBT on file   No flexible sigmoidoscopy on file     “Have you had a diabetic eye exam?”    YES - Where: [facility]   Nurse/CMA to request most recent records if not in the chart     Date of last diabetic eye exam: 6/15/2018            
as needed for muscle spasm     Associated Diagnoses:  --     DULoxetine (CYMBALTA) 60 MG extended release capsule  10/25/24  04/23/25     Take 1 capsule by mouth 2 times daily     Associated Diagnoses:  --     EPINEPHrine (EPIPEN) 0.3 MG/0.3ML SOAJ injection  08/08/23  --     Inject 0.3 ml intramuscularly once as needed for allergic reaction for up to 1 dose     Associated Diagnoses:  --     furosemide (LASIX) 20 MG tablet  10/25/24  04/23/25     Take 1 tablet by mouth daily     Associated Diagnoses:  --     gabapentin (NEURONTIN) 300 MG capsule  10/25/24  01/23/25     Take 1 capsule by mouth nightly for 90 days. Max Daily Amount: 300 mg     Associated Diagnoses:  Peripheral polyneuropathy     gemfibrozil (LOPID) 600 MG tablet  10/25/24  04/23/25     Take 1 tablet by mouth 2 times daily     Associated Diagnoses:  --     glimepiride (AMARYL) 2 MG tablet  10/25/24  04/23/25     Take 1 tablet by mouth 2 times daily     Associated Diagnoses:  --     hydrOXYzine HCl (ATARAX) 10 MG tablet  03/21/23  --     Associated Diagnoses:  --     hydrOXYzine HCl (ATARAX) 25 MG tablet  01/22/24  --     Take 1 tablet by mouth 3 times a day as needed for anxiety     Associated Diagnoses:  --     Lancets MISC  02/05/18  --     Associated Diagnoses:  --     Lancets MISC  09/04/19  --     Associated Diagnoses:  --     metFORMIN (GLUCOPHAGE-XR) 500 MG extended release tablet  10/25/24  --     TAKE 1 TABLET BY MOUTH THREE TIMES DAILY     Associated Diagnoses:  --     metoprolol tartrate (LOPRESSOR) 25 MG tablet  10/25/24  04/23/25     Take 1 tablet by mouth 2 times daily     Associated Diagnoses:  --     Multiple Vitamins-Minerals (THEREMS-M) TABS  03/20/24  --     Take 1 tablet by mouth daily     Associated Diagnoses:  --     nitroGLYCERIN (NITROSTAT) 0.4 MG SL tablet  08/08/23  --     Dissolve 1 tablet under tongue every 5 minutes, up to 3 doses when needed for chest pain. max 3 tabs in 15min     Associated Diagnoses:  --     pantoprazole

## 2024-11-26 RX ORDER — POTASSIUM CHLORIDE 1500 MG/1
20 TABLET, EXTENDED RELEASE ORAL DAILY
Qty: 30 TABLET | Refills: 0 | Status: SHIPPED | OUTPATIENT
Start: 2024-11-26

## 2024-12-03 RX ORDER — CYCLOBENZAPRINE HCL 10 MG
10 TABLET ORAL 3 TIMES DAILY PRN
Qty: 90 TABLET | Refills: 0 | Status: SHIPPED | OUTPATIENT
Start: 2024-12-03

## 2024-12-04 ENCOUNTER — TELEPHONE (OUTPATIENT)
Facility: CLINIC | Age: 59
End: 2024-12-04

## 2024-12-04 NOTE — TELEPHONE ENCOUNTER
Pt needs medication refill for prescription:    cyclobenzaprine (FLEXERIL) 10 MG tablet  Pt is currently all out of medication.  # [phone]  Thank You

## 2024-12-10 ENCOUNTER — CLINICAL DOCUMENTATION (OUTPATIENT)
Facility: CLINIC | Age: 59
End: 2024-12-10

## 2024-12-17 ENCOUNTER — TELEPHONE (OUTPATIENT)
Facility: CLINIC | Age: 59
End: 2024-12-17

## 2024-12-17 NOTE — TELEPHONE ENCOUNTER
Pt diabetic supply forms has been on [employer] since the 10th of December. Pt called to get an update because he is in urgent need of his supplies.  Pt asked to be called and updated as to the status of the form that was faxed to us.  CB# [phone]  Thank You

## 2024-12-24 RX ORDER — POTASSIUM CHLORIDE 1500 MG/1
20 TABLET, EXTENDED RELEASE ORAL DAILY
Qty: 30 TABLET | Refills: 0 | Status: SHIPPED | OUTPATIENT
Start: 2024-12-24

## 2025-01-02 RX ORDER — IBUPROFEN 800 MG/1
800 TABLET, FILM COATED ORAL EVERY 8 HOURS PRN
Qty: 90 TABLET | Refills: 0 | Status: SHIPPED | OUTPATIENT
Start: 2025-01-02

## 2025-01-13 RX ORDER — CYCLOBENZAPRINE HCL 10 MG
10 TABLET ORAL 3 TIMES DAILY PRN
Qty: 90 TABLET | Refills: 0 | Status: SHIPPED | OUTPATIENT
Start: 2025-01-13

## 2025-01-20 RX ORDER — POTASSIUM CHLORIDE 1500 MG/1
20 TABLET, EXTENDED RELEASE ORAL DAILY
Qty: 30 TABLET | Refills: 0 | Status: SHIPPED | OUTPATIENT
Start: 2025-01-20

## 2025-02-04 ENCOUNTER — CLINICAL DOCUMENTATION (OUTPATIENT)
Facility: CLINIC | Age: 60
End: 2025-02-04

## 2025-02-17 RX ORDER — CYCLOBENZAPRINE HCL 10 MG
10 TABLET ORAL 3 TIMES DAILY PRN
Qty: 90 TABLET | Refills: 0 | Status: SHIPPED | OUTPATIENT
Start: 2025-02-17

## 2025-02-18 RX ORDER — POTASSIUM CHLORIDE 1500 MG/1
20 TABLET, EXTENDED RELEASE ORAL DAILY
Qty: 30 TABLET | Refills: 0 | Status: SHIPPED | OUTPATIENT
Start: 2025-02-18

## 2025-02-20 ENCOUNTER — TELEPHONE (OUTPATIENT)
Facility: CLINIC | Age: 60
End: 2025-02-20

## 2025-02-20 ASSESSMENT — PATIENT HEALTH QUESTIONNAIRE - PHQ9
SUM OF ALL RESPONSES TO PHQ QUESTIONS 1-9: 2
2. FEELING DOWN, DEPRESSED OR HOPELESS: SEVERAL DAYS
SUM OF ALL RESPONSES TO PHQ QUESTIONS 1-9: 2
SUM OF ALL RESPONSES TO PHQ9 QUESTIONS 1 & 2: 2
1. LITTLE INTEREST OR PLEASURE IN DOING THINGS: SEVERAL DAYS

## 2025-02-24 ENCOUNTER — OFFICE VISIT (OUTPATIENT)
Facility: CLINIC | Age: 60
End: 2025-02-24

## 2025-02-24 VITALS
RESPIRATION RATE: 16 BRPM | SYSTOLIC BLOOD PRESSURE: 136 MMHG | HEIGHT: 69 IN | HEART RATE: 64 BPM | OXYGEN SATURATION: 96 % | BODY MASS INDEX: 23.11 KG/M2 | WEIGHT: 156 LBS | TEMPERATURE: 97.6 F | DIASTOLIC BLOOD PRESSURE: 80 MMHG

## 2025-02-24 DIAGNOSIS — Z00.00 MEDICARE ANNUAL WELLNESS VISIT, SUBSEQUENT: Primary | ICD-10-CM

## 2025-02-24 DIAGNOSIS — E11.40 TYPE 2 DIABETES MELLITUS WITH DIABETIC NEUROPATHY, WITHOUT LONG-TERM CURRENT USE OF INSULIN (HCC): ICD-10-CM

## 2025-02-24 DIAGNOSIS — Z87.891 PERSONAL HISTORY OF TOBACCO USE: ICD-10-CM

## 2025-02-24 DIAGNOSIS — F25.9 SCHIZOAFFECTIVE DISORDER, UNSPECIFIED TYPE (HCC): ICD-10-CM

## 2025-02-24 DIAGNOSIS — J43.9 PULMONARY EMPHYSEMA, UNSPECIFIED EMPHYSEMA TYPE (HCC): ICD-10-CM

## 2025-02-24 DIAGNOSIS — I69.359 HEMIPLEGIA AND HEMIPARESIS FOLLOWING CEREBRAL INFARCTION AFFECTING UNSPECIFIED SIDE (HCC): ICD-10-CM

## 2025-02-24 DIAGNOSIS — J06.9 ACUTE UPPER RESPIRATORY INFECTION: ICD-10-CM

## 2025-02-24 DIAGNOSIS — Z12.5 ENCOUNTER FOR SCREENING FOR MALIGNANT NEOPLASM OF PROSTATE: ICD-10-CM

## 2025-02-24 RX ORDER — AZITHROMYCIN 250 MG/1
TABLET, FILM COATED ORAL
Qty: 6 TABLET | Refills: 0 | Status: SHIPPED | OUTPATIENT
Start: 2025-02-24 | End: 2025-02-25 | Stop reason: SDUPTHER

## 2025-02-24 RX ORDER — LORATADINE 10 MG/1
10 TABLET ORAL DAILY
Qty: 90 TABLET | Refills: 1 | Status: SHIPPED | OUTPATIENT
Start: 2025-02-24 | End: 2025-02-25 | Stop reason: SDUPTHER

## 2025-02-24 SDOH — ECONOMIC STABILITY: FOOD INSECURITY: WITHIN THE PAST 12 MONTHS, YOU WORRIED THAT YOUR FOOD WOULD RUN OUT BEFORE YOU GOT MONEY TO BUY MORE.: NEVER TRUE

## 2025-02-24 SDOH — ECONOMIC STABILITY: FOOD INSECURITY: WITHIN THE PAST 12 MONTHS, THE FOOD YOU BOUGHT JUST DIDN'T LAST AND YOU DIDN'T HAVE MONEY TO GET MORE.: NEVER TRUE

## 2025-02-24 ASSESSMENT — PATIENT HEALTH QUESTIONNAIRE - PHQ9
SUM OF ALL RESPONSES TO PHQ QUESTIONS 1-9: 2
7. TROUBLE CONCENTRATING ON THINGS, SUCH AS READING THE NEWSPAPER OR WATCHING TELEVISION: NOT AT ALL
SUM OF ALL RESPONSES TO PHQ QUESTIONS 1-9: 2
1. LITTLE INTEREST OR PLEASURE IN DOING THINGS: SEVERAL DAYS
8. MOVING OR SPEAKING SO SLOWLY THAT OTHER PEOPLE COULD HAVE NOTICED. OR THE OPPOSITE, BEING SO FIGETY OR RESTLESS THAT YOU HAVE BEEN MOVING AROUND A LOT MORE THAN USUAL: NOT AT ALL
5. POOR APPETITE OR OVEREATING: NOT AT ALL
10. IF YOU CHECKED OFF ANY PROBLEMS, HOW DIFFICULT HAVE THESE PROBLEMS MADE IT FOR YOU TO DO YOUR WORK, TAKE CARE OF THINGS AT HOME, OR GET ALONG WITH OTHER PEOPLE: NOT DIFFICULT AT ALL
3. TROUBLE FALLING OR STAYING ASLEEP: NOT AT ALL
6. FEELING BAD ABOUT YOURSELF - OR THAT YOU ARE A FAILURE OR HAVE LET YOURSELF OR YOUR FAMILY DOWN: NOT AT ALL
2. FEELING DOWN, DEPRESSED OR HOPELESS: SEVERAL DAYS
SUM OF ALL RESPONSES TO PHQ QUESTIONS 1-9: 2
9. THOUGHTS THAT YOU WOULD BE BETTER OFF DEAD, OR OF HURTING YOURSELF: NOT AT ALL
4. FEELING TIRED OR HAVING LITTLE ENERGY: NOT AT ALL
SUM OF ALL RESPONSES TO PHQ9 QUESTIONS 1 & 2: 2
SUM OF ALL RESPONSES TO PHQ QUESTIONS 1-9: 2

## 2025-02-24 NOTE — PROGRESS NOTES
Medicare Annual Wellness Visit    Jesus Eastman is here for Medicare AWV  Time was used for level of billing: yes, 30 minutes reviewing previous notes, test results and office visit with the patient discussing the diagnosis and importance of compliance with the treatment plan as well as documenting on the day of the visit.    Assessment & Plan  1. Laryngitis.  He has had a cold for at least a week or two, accompanied by laryngitis and a clear runny nose. He reports coughing but no productive sputum. A prescription for a Z-Georges and Claritin will be sent to [facility].    2. Diabetes Mellitus.  His blood pressure is normal. He is advised to quit smoking. An A1c test will be conducted today. The Cande sensor prescription has been sent to [facility].    3. Health Maintenance.  Clock test is normal. A comprehensive set of tests will be performed today, including urine, liver, kidney, and prostate evaluations.  Time was used for level of billing: yes, 30 minutes reviewing previous notes, test results and office visit with the patient discussing the diagnosis and importance of compliance with the treatment plan as well as documenting on the day of the visit.    Medicare annual wellness visit, subsequent  -     Continuous Glucose Sensor (FREESTYLE CANDE 2 SENSOR) MISC; Use as directed, Disp-2 each, R-11Normal  -     Hemoglobin A1C; Future  -     Comprehensive Metabolic Panel; Future  -     CBC with Auto Differential; Future  -     Albumin/Creatinine Ratio, Urine; Future  -     TSH; Future  -     Lipid Panel; Future  -     PSA Screening; Future  Type 2 diabetes mellitus with diabetic neuropathy, without long-term current use of insulin (HCC)  -     Continuous Glucose Sensor (FREESTYLE CANDE 2 SENSOR) MISC; Use as directed, Disp-2 each, R-11Normal  -     Hemoglobin A1C; Future  -     Comprehensive Metabolic Panel; Future  -     CBC with Auto Differential; Future  -     Albumin/Creatinine Ratio, Urine;

## 2025-02-24 NOTE — PROGRESS NOTES
Chief Complaint   Patient presents with    Medicare AWV     Patient presents in office today for AMW visit and fasting labs.  Has c/o cough and congestion for several weeks.  No other concerns.        Jesus Eastman  2/24/2025  Provider:   [place]:  Diabetes Report Card   1) Have you seen the eye doctor in past year?yes    2) How would you  rate your Diabetic Diet?5/10   3) How well do you take care of your feet?good   4) Do you keep your Primary Care Follow Up Appts?yes    5) Do you know your A1C goal?no    6) Do you take your medications daily?yes    7) Do you check your blood sugars?yes    8) Have you gained weight?no       9) Do you follow an exercise program?no    10) Can you do better?yes      Hemoglobin A1C   Date Value Ref Range Status   10/25/2024 9.9 (H) 4.0 - 5.6 % Final     Comment:     (NOTE)  HbA1C Interpretive Ranges  <5.7              Normal  5.7 - 6.4         Consider Prediabetes  >6.5              Consider Diabetes         \"Have you been to the ER, urgent care clinic since your last visit?  Hospitalized since your last visit?\"    NO    “Have you seen or consulted any other health care providers outside our system since your last visit?”    NO      “Have you had a colorectal cancer screening such as a colonoscopy/FIT/Cologuard?    NO    Date of last Colonoscopy: 9/9/2016  No cologuard on file  No FIT/FOBT on file   No flexible sigmoidoscopy on file     “Have you had a diabetic eye exam?”    YES - Where: [facility] Nurse/CMA to request most recent records if not in the chart     Date of last diabetic eye exam: 6/15/2018

## 2025-02-24 NOTE — PATIENT INSTRUCTIONS
most important things you can do to protect your heart. It is never too late to quit. Try to avoid secondhand smoke too.     Stay at a weight that's healthy for you. Talk to your doctor if you need help losing weight.     Try to get 7 to 9 hours of sleep each night.     Limit alcohol to 2 drinks a day for men and 1 drink a day for women. Too much alcohol can cause health problems.     Manage other health problems such as diabetes, high blood pressure, and high cholesterol. If you think you may have a problem with alcohol or drug use, talk to your doctor.   Medicines    Take your medicines exactly as prescribed. Call your doctor if you think you are having a problem with your medicine.     If your doctor recommends aspirin, take the amount directed each day. Make sure you take aspirin and not another kind of pain reliever, such as acetaminophen (Tylenol).   When should you call for help?   Call 911 if you have symptoms of a heart attack. These may include:    Chest pain or pressure, or a strange feeling in the chest.     Sweating.     Shortness of breath.     Pain, pressure, or a strange feeling in the back, neck, jaw, or upper belly or in one or both shoulders or arms.     Lightheadedness or sudden weakness.     A fast or irregular heartbeat.   After you call 911, the  may tell you to chew 1 adult-strength or 2 to 4 low-dose aspirin. Wait for an ambulance. Do not try to drive yourself.  Watch closely for changes in your health, and be sure to contact your doctor if you have any problems.  Where can you learn more?  Go to https://www.[employer] [place].net/patientEd and enter F075 to learn more about \"A Healthy Heart: Care Instructions.\"  Current as of: July 31, 2024  Content Version: 14.3  © 2024 [employer].   Care instructions adapted under license by [employer]. If you have questions about a medical condition or this instruction, always ask your healthcare professional. [employer],

## 2025-02-25 ENCOUNTER — TELEPHONE (OUTPATIENT)
Facility: CLINIC | Age: 60
End: 2025-02-25

## 2025-02-25 DIAGNOSIS — E11.40 TYPE 2 DIABETES MELLITUS WITH DIABETIC NEUROPATHY, WITHOUT LONG-TERM CURRENT USE OF INSULIN (HCC): ICD-10-CM

## 2025-02-25 DIAGNOSIS — Z00.00 MEDICARE ANNUAL WELLNESS VISIT, SUBSEQUENT: ICD-10-CM

## 2025-02-25 DIAGNOSIS — J06.9 ACUTE UPPER RESPIRATORY INFECTION: ICD-10-CM

## 2025-02-25 RX ORDER — LORATADINE 10 MG/1
10 TABLET ORAL DAILY
Qty: 90 TABLET | Refills: 1 | Status: SHIPPED | OUTPATIENT
Start: 2025-02-25

## 2025-02-25 RX ORDER — AZITHROMYCIN 250 MG/1
TABLET, FILM COATED ORAL
Qty: 6 TABLET | Refills: 0 | Status: SHIPPED | OUTPATIENT
Start: 2025-02-25

## 2025-02-25 RX ORDER — POTASSIUM CHLORIDE 1500 MG/1
20 TABLET, EXTENDED RELEASE ORAL DAILY
Qty: 30 TABLET | Refills: 5 | Status: SHIPPED | OUTPATIENT
Start: 2025-02-25

## 2025-02-25 NOTE — TELEPHONE ENCOUNTER
Pt needs these meds switched to the [facility] on [address].  Continuous Glucose Sensor (FREESTYLE CANDE 2 SENSOR) MISC     azithromycin (ZITHROMAX) 250 MG tablet     loratadine (CLARITIN) 10 MG tablet

## 2025-02-26 LAB
ALBUMIN SERPL-MCNC: 4.4 G/DL (ref 3.9–4.9)
ALBUMIN/CREAT UR: 35 MG/G CREAT (ref 0–29)
ALP SERPL-CCNC: 119 IU/L (ref 44–121)
ALT SERPL-CCNC: 14 IU/L (ref 0–44)
AST SERPL-CCNC: 13 IU/L (ref 0–40)
BASOPHILS # BLD AUTO: 0 X10E3/UL (ref 0–0.2)
BASOPHILS NFR BLD AUTO: 1 %
BILIRUB SERPL-MCNC: <0.2 MG/DL (ref 0–1.2)
BUN SERPL-MCNC: 18 MG/DL (ref 8–27)
BUN/CREAT SERPL: 18 (ref 10–24)
CALCIUM SERPL-MCNC: 9.5 MG/DL (ref 8.6–10.2)
CHLORIDE SERPL-SCNC: 102 MMOL/L (ref 96–106)
CHOLEST SERPL-MCNC: 127 MG/DL (ref 100–199)
CO2 SERPL-SCNC: 17 MMOL/L (ref 20–29)
CREAT SERPL-MCNC: 0.99 MG/DL (ref 0.76–1.27)
CREAT UR-MCNC: 55.3 MG/DL
EGFRCR SERPLBLD CKD-EPI 2021: 87 ML/MIN/1.73
EOSINOPHIL # BLD AUTO: 0.1 X10E3/UL (ref 0–0.4)
EOSINOPHIL NFR BLD AUTO: 2 %
ERYTHROCYTE [DISTWIDTH] IN BLOOD BY AUTOMATED COUNT: 13 % (ref 11.6–15.4)
GLOBULIN SER CALC-MCNC: 2.2 G/DL (ref 1.5–4.5)
GLUCOSE SERPL-MCNC: 334 MG/DL (ref 70–99)
HBA1C MFR BLD: 11.6 % (ref 4.8–5.6)
HCT VFR BLD AUTO: 42.1 % (ref 37.5–51)
HDLC SERPL-MCNC: 38 MG/DL
HGB BLD-MCNC: 14.1 G/DL (ref 13–17.7)
IMM GRANULOCYTES # BLD AUTO: 0 X10E3/UL (ref 0–0.1)
IMM GRANULOCYTES NFR BLD AUTO: 1 %
LDLC SERPL CALC-MCNC: 68 MG/DL (ref 0–99)
LYMPHOCYTES # BLD AUTO: 0.9 X10E3/UL (ref 0.7–3.1)
LYMPHOCYTES NFR BLD AUTO: 16 %
MCH RBC QN AUTO: 31 PG (ref 26.6–33)
MCHC RBC AUTO-ENTMCNC: 33.5 G/DL (ref 31.5–35.7)
MCV RBC AUTO: 93 FL (ref 79–97)
MICROALBUMIN UR-MCNC: 19.5 UG/ML
MONOCYTES # BLD AUTO: 0.3 X10E3/UL (ref 0.1–0.9)
MONOCYTES NFR BLD AUTO: 6 %
NEUTROPHILS # BLD AUTO: 4.2 X10E3/UL (ref 1.4–7)
NEUTROPHILS NFR BLD AUTO: 74 %
PLATELET # BLD AUTO: 246 X10E3/UL (ref 150–450)
POTASSIUM SERPL-SCNC: 5 MMOL/L (ref 3.5–5.2)
PROT SERPL-MCNC: 6.6 G/DL (ref 6–8.5)
PSA SERPL-MCNC: 1.5 NG/ML (ref 0–4)
RBC # BLD AUTO: 4.55 X10E6/UL (ref 4.14–5.8)
SODIUM SERPL-SCNC: 135 MMOL/L (ref 134–144)
SPECIMEN STATUS REPORT: NORMAL
TRIGL SERPL-MCNC: 117 MG/DL (ref 0–149)
TSH SERPL DL<=0.005 MIU/L-ACNC: 0.74 UIU/ML (ref 0.45–4.5)
VLDLC SERPL CALC-MCNC: 21 MG/DL (ref 5–40)
WBC # BLD AUTO: 5.6 X10E3/UL (ref 3.4–10.8)

## 2025-03-05 ENCOUNTER — TELEPHONE (OUTPATIENT)
Facility: CLINIC | Age: 60
End: 2025-03-05

## 2025-03-05 RX ORDER — CEFDINIR 300 MG/1
300 CAPSULE ORAL 2 TIMES DAILY
Qty: 20 CAPSULE | Refills: 0 | Status: SHIPPED | OUTPATIENT
Start: 2025-03-05 | End: 2025-03-15

## 2025-03-05 NOTE — TELEPHONE ENCOUNTER
Called and spoke with the patient, patient understanding that he had cefdinir called into his pharmacy on [address].

## 2025-03-10 RX ORDER — METFORMIN HYDROCHLORIDE 500 MG/1
500 TABLET, EXTENDED RELEASE ORAL 3 TIMES DAILY
Qty: 213 TABLET | Refills: 4 | Status: SHIPPED | OUTPATIENT
Start: 2025-03-10

## 2025-03-12 DIAGNOSIS — E11.9 TYPE 2 DIABETES MELLITUS WITHOUT COMPLICATION, WITHOUT LONG-TERM CURRENT USE OF INSULIN (HCC): ICD-10-CM

## 2025-03-13 RX ORDER — PIOGLITAZONE 15 MG/1
15 TABLET ORAL DAILY
Qty: 30 TABLET | Refills: 0 | Status: SHIPPED | OUTPATIENT
Start: 2025-03-13

## 2025-03-18 ENCOUNTER — OFFICE VISIT (OUTPATIENT)
Facility: CLINIC | Age: 60
End: 2025-03-18
Payer: MEDICARE

## 2025-03-18 VITALS
WEIGHT: 155 LBS | BODY MASS INDEX: 22.96 KG/M2 | RESPIRATION RATE: 20 BRPM | HEIGHT: 69 IN | HEART RATE: 68 BPM | SYSTOLIC BLOOD PRESSURE: 147 MMHG | DIASTOLIC BLOOD PRESSURE: 88 MMHG | OXYGEN SATURATION: 95 % | TEMPERATURE: 97.8 F

## 2025-03-18 DIAGNOSIS — J02.9 SORE THROAT: ICD-10-CM

## 2025-03-18 DIAGNOSIS — R05.3 PERSISTENT COUGH: ICD-10-CM

## 2025-03-18 DIAGNOSIS — J02.9 SORE THROAT: Primary | ICD-10-CM

## 2025-03-18 PROCEDURE — 99213 OFFICE O/P EST LOW 20 MIN: CPT | Performed by: PHYSICIAN ASSISTANT

## 2025-03-18 PROCEDURE — 3079F DIAST BP 80-89 MM HG: CPT | Performed by: PHYSICIAN ASSISTANT

## 2025-03-18 PROCEDURE — 3077F SYST BP >= 140 MM HG: CPT | Performed by: PHYSICIAN ASSISTANT

## 2025-03-18 NOTE — PROGRESS NOTES
Jesus Eastman (:  1965) is a 59 y.o. male, here for evaluation of the following chief complaint(s):  Cold Symptoms (Patient c/o sneezing, cough, sore throat, patient has finished the cefdinir and zpak given by Dr. Matt. Patient is still taking the Claritin. )         Assessment & Plan  1. Cold and laryngitis.  He has been on two antibiotics, Z-Georges and cefdinir, without improvement. Symptoms include coughing, sneezing, sore throat, chills, and body aches. He reports a persistent sore throat and brown-colored sputum. A chest x-ray will be ordered to ensure there are no underlying issues. A throat swab will be performed today to check for any bacterial growth. He will be referred to an Ear, Nose, and Throat (ENT) specialist for further evaluation.    2. Medication management.  He is currently taking loratadine and pantoprazole. He is also on Plavix and has been using ibuprofen daily for pain management. He is advised to limit the use of ibuprofen due to potential kidney damage and its interaction with Plavix.    Results    1. Sore throat  -     Culture, Throat; Future  -     [facility], N[facility] Th[facility]at, and Allergy [facility], [place]  2. Persistent cough  -     XR CHEST (2 VIEWS); Future    No follow-ups on file.       Subjective   History of Present Illness  The patient presents for evaluation of a cold and laryngitis.    He has been experiencing symptoms of a cold, including coughing, sneezing, and a sore throat, for approximately 2 weeks. He also reports chills and body aches. Despite completing a course of cefdinir last night, there has been no improvement in his condition. He continues to take loratadine and reports no postnasal drip or reflux. His runny nose has improved, but he continues to cough up brown-colored sputum. He has not undergone a chest x-ray. He is a smoker and does not consume alcohol. He has not had a throat swab performed. He has been self-medicating with Robitussin for his 
Sign     Worried About Running Out of Food in the Last Year: Never true     Ran Out of Food in the Last Year: Never true   Transportation Needs: No Transportation Needs (2/24/2025)    PRAPARE - Transportation     Lack of Transportation (Medical): No     Lack of Transportation (Non-Medical): No   Physical Activity: Sufficiently Active (2/20/2025)    Exercise Vital Sign     Days of Exercise per Week: 5 days     Minutes of Exercise per Session: 30 min   Stress: Not on file   Social Connections: Not on file   Intimate Partner Violence: Not on file   Depression: Not at risk (2/24/2025)    PHQ-2     PHQ-2 Score: 2   Housing Stability: Low Risk  (2/24/2025)    Housing Stability Vital Sign     Unable to Pay for Housing in the Last Year: No     Number of Times Moved in the Last Year: 0     Homeless in the Last Year: No   Interpersonal Safety: Not At Risk (10/21/2024)    Interpersonal Safety Domain Source: IP Abuse Screening     Physical abuse: Denies     Verbal abuse: Denies     Emotional abuse: Denies     Financial abuse: Denies     Sexual abuse: Denies   Utilities: Not At Risk (2/24/2025)    [facility] Utilities     Threatened with loss of utilities: No       \"Have you been to the ER, urgent care clinic since your last visit?  Hospitalized since your last visit?\"    NO    “Have you seen or consulted any other health care providers outside of [facility] since your last visit?”    NO        “Have you had a colorectal cancer screening such as a colonoscopy/FIT/Cologuard?    NO    Date of last Colonoscopy: 9/9/2016  No cologuard on file  No FIT/FOBT on file   No flexible sigmoidoscopy on file         Click Here for Release of Records Request

## 2025-03-19 ENCOUNTER — HOSPITAL ENCOUNTER (OUTPATIENT)
Facility: HOSPITAL | Age: 60
Discharge: HOME | End: 2025-03-22
Payer: MEDICARE

## 2025-03-19 DIAGNOSIS — R05.3 PERSISTENT COUGH: ICD-10-CM

## 2025-03-19 PROCEDURE — 71046 X-RAY EXAM CHEST 2 VIEWS: CPT

## 2025-03-20 ENCOUNTER — TELEPHONE (OUTPATIENT)
Facility: CLINIC | Age: 60
End: 2025-03-20

## 2025-03-20 ENCOUNTER — RESULTS FOLLOW-UP (OUTPATIENT)
Facility: HOSPITAL | Age: 60
End: 2025-03-20

## 2025-03-20 ENCOUNTER — RESULTS FOLLOW-UP (OUTPATIENT)
Facility: CLINIC | Age: 60
End: 2025-03-20

## 2025-03-20 LAB
BACTERIA SPEC CULT: NORMAL
SERVICE CMNT-IMP: NORMAL

## 2025-03-20 NOTE — TELEPHONE ENCOUNTER
Patient calling stating Continuous Glucose Sensor (FREESTYLE CANDE 2 SENSOR) [facility] is requiring a p/a.     # [phone]

## 2025-03-24 RX ORDER — CYCLOBENZAPRINE HCL 10 MG
10 TABLET ORAL 3 TIMES DAILY PRN
Qty: 90 TABLET | Refills: 0 | Status: SHIPPED | OUTPATIENT
Start: 2025-03-24

## 2025-03-28 ENCOUNTER — TELEPHONE (OUTPATIENT)
Facility: CLINIC | Age: 60
End: 2025-03-28

## 2025-03-28 RX ORDER — ACYCLOVIR 400 MG/1
1 TABLET ORAL CONTINUOUS
Qty: 1 EACH | Refills: 0 | Status: SHIPPED | OUTPATIENT
Start: 2025-03-28

## 2025-03-28 RX ORDER — ACYCLOVIR 400 MG/1
1 TABLET ORAL
COMMUNITY
End: 2025-03-28 | Stop reason: SDUPTHER

## 2025-03-28 RX ORDER — ACYCLOVIR 400 MG/1
1 TABLET ORAL CONTINUOUS
COMMUNITY
End: 2025-03-28 | Stop reason: SDUPTHER

## 2025-03-28 RX ORDER — ACYCLOVIR 400 MG/1
1 TABLET ORAL
Qty: 9 EACH | Refills: 3 | Status: SHIPPED | OUTPATIENT
Start: 2025-03-28

## 2025-03-28 NOTE — TELEPHONE ENCOUNTER
Patient states new insurance will no longer cover Marvel and dexcom 7 reader and sensors are needed to be ordered with Prior authorization needed.  Sent to pharmacy.

## 2025-03-28 NOTE — TELEPHONE ENCOUNTER
Returned call to patient. States his new insurance wont cover Marvel , but will cover Dexcom 7 and he needs a reader for it as well. Will send to [facility] for the PA.

## 2025-03-31 ENCOUNTER — CLINICAL DOCUMENTATION (OUTPATIENT)
Facility: HOSPITAL | Age: 60
End: 2025-03-31

## 2025-03-31 ENCOUNTER — OFFICE VISIT (OUTPATIENT)
Facility: CLINIC | Age: 60
End: 2025-03-31
Payer: MEDICARE

## 2025-03-31 VITALS
RESPIRATION RATE: 16 BRPM | OXYGEN SATURATION: 94 % | SYSTOLIC BLOOD PRESSURE: 114 MMHG | TEMPERATURE: 97.4 F | HEIGHT: 69 IN | BODY MASS INDEX: 22.96 KG/M2 | WEIGHT: 155 LBS | DIASTOLIC BLOOD PRESSURE: 72 MMHG | HEART RATE: 62 BPM

## 2025-03-31 DIAGNOSIS — J02.9 SORE THROAT: Primary | ICD-10-CM

## 2025-03-31 PROCEDURE — 3074F SYST BP LT 130 MM HG: CPT | Performed by: FAMILY MEDICINE

## 2025-03-31 PROCEDURE — 99213 OFFICE O/P EST LOW 20 MIN: CPT | Performed by: FAMILY MEDICINE

## 2025-03-31 PROCEDURE — 3078F DIAST BP <80 MM HG: CPT | Performed by: FAMILY MEDICINE

## 2025-03-31 RX ORDER — CARBINOXAMINE MALEATE 4 MG/1
1 TABLET ORAL 2 TIMES DAILY
Qty: 60 TABLET | Refills: 5 | Status: SHIPPED | OUTPATIENT
Start: 2025-03-31

## 2025-03-31 NOTE — PROGRESS NOTES
[facility]  Specialty Pharmacy Update    Date: 03/31/25    Jesus Eastman 1965     Medication: Dexcom G7 Sensors and     Prior Authorization: through 3/31/2026    Copay: $0    Please send to local pharmacy:    [address] -  [phone]     Corinne McEwen, [facility] at [address], [address]   [place] [address]  phone: [phone]   fax: [phone]

## 2025-03-31 NOTE — PROGRESS NOTES
Chief Complaint   Patient presents with    Cough     Patient presents in office today with c/o laryngitis not improving from his visit with Karen.  States that he his still having a sore and is still hoarse.   Has and apt with ENT on 5/5/25.  No other concerns.      \"Have you been to the ER, urgent care clinic since your last visit?  Hospitalized since your last visit?\"    NO    “Have you seen or consulted any other health care providers outside our system since your last visit?”    NO      “Have you had a colorectal cancer screening such as a colonoscopy/FIT/Cologuard?    NO    Date of last Colonoscopy: 9/9/2016  No cologuard on file  No FIT/FOBT on file   No flexible sigmoidoscopy on file     “Have you had a diabetic eye exam?”    YES - Where: [facility]  Nurse/CMA to request most recent records if not in the chart     Date of last diabetic eye exam: 6/15/2018

## 2025-03-31 NOTE — PROGRESS NOTES
Chief Complaint   Patient presents with    Cough     Patient presents in office today with c/o laryngitis not improving from his visit with Karen.  States that he his still having a sore and is still hoarse.   Has and apt with ENT on 25.  No other concerns.      \"Have you been to the ER, urgent care clinic since your last visit?  Hospitalized since your last visit?\"    NO    “Have you seen or consulted any other health care providers outside our system since your last visit?”    NO      “Have you had a colorectal cancer screening such as a colonoscopy/FIT/Cologuard?    NO    Date of last Colonoscopy: 2016  No cologuard on file  No FIT/FOBT on file   No flexible sigmoidoscopy on file     “Have you had a diabetic eye exam?”    YES - Where: [facility]  Nurse/CMA to request most recent records if not in the chart     Date of last diabetic eye exam: 6/15/2018                Jesus Eastman (:  1965) is a 59 y.o. male, here for evaluation of the following chief complaint(s):  Cough         Assessment & Plan  1. Sore throat.  - Experiencing a sore throat for one month, accompanied by hoarseness.  - Throat culture and chest x-ray previously came back clear; examination revealed significant postnasal drip.  - Discussed discontinuing Claritin and starting a new medication to help dry up the postnasal drip.  - Prescription for new medication will be sent to [facility] pharmacy; follow-up with an ear, nose, and throat doctor on 2025.    Results  Labs   - Throat culture: Clear    Imaging   - Chest x-ray: Clear  1. Sore throat  -     Carbinoxamine Maleate 4 MG TABS; Take 1 tablet by mouth in the morning and at bedtime For allergies, Disp-60 tablet, R-5Normal    No follow-ups on file.       Subjective   History of Present Illness  The patient presents for evaluation of a sore throat and hoarseness.    She has been experiencing a persistent sore throat and hoarseness for the past month. Despite being prescribed two

## 2025-04-09 RX ORDER — GLIMEPIRIDE 2 MG/1
2 TABLET ORAL 2 TIMES DAILY
Qty: 180 TABLET | Refills: 0 | Status: SHIPPED | OUTPATIENT
Start: 2025-04-09

## 2025-04-09 RX ORDER — SITAGLIPTIN 100 MG/1
100 TABLET, FILM COATED ORAL DAILY
Qty: 90 TABLET | Refills: 0 | Status: ACTIVE | OUTPATIENT
Start: 2025-04-09

## 2025-04-10 RX ORDER — IBUPROFEN 800 MG/1
800 TABLET, FILM COATED ORAL EVERY 8 HOURS PRN
Qty: 90 TABLET | Refills: 0 | Status: SHIPPED | OUTPATIENT
Start: 2025-04-10

## 2025-04-15 DIAGNOSIS — E11.9 TYPE 2 DIABETES MELLITUS WITHOUT COMPLICATION, WITHOUT LONG-TERM CURRENT USE OF INSULIN: ICD-10-CM

## 2025-04-15 RX ORDER — PIOGLITAZONE 15 MG/1
15 TABLET ORAL DAILY
Qty: 30 TABLET | Refills: 0 | Status: SHIPPED | OUTPATIENT
Start: 2025-04-15

## 2025-04-16 RX ORDER — FUROSEMIDE 20 MG/1
20 TABLET ORAL DAILY
Qty: 90 TABLET | Refills: 1 | Status: SHIPPED | OUTPATIENT
Start: 2025-04-16

## 2025-04-28 RX ORDER — CYCLOBENZAPRINE HCL 10 MG
10 TABLET ORAL 3 TIMES DAILY PRN
Qty: 90 TABLET | Refills: 0 | Status: SHIPPED | OUTPATIENT
Start: 2025-04-28

## 2025-04-28 RX ORDER — CYCLOBENZAPRINE HCL 10 MG
10 TABLET ORAL 3 TIMES DAILY PRN
Qty: 90 TABLET | Refills: 0 | OUTPATIENT
Start: 2025-04-28

## 2025-05-05 ENCOUNTER — OFFICE VISIT (OUTPATIENT)
Age: 60
End: 2025-05-05
Payer: MEDICARE

## 2025-05-05 ENCOUNTER — APPOINTMENT (OUTPATIENT)
Facility: HOSPITAL | Age: 60
End: 2025-05-05
Payer: MEDICARE

## 2025-05-05 ENCOUNTER — HOSPITAL ENCOUNTER (EMERGENCY)
Facility: HOSPITAL | Age: 60
Discharge: HOME | End: 2025-05-05
Attending: STUDENT IN AN ORGANIZED HEALTH CARE EDUCATION/TRAINING PROGRAM
Payer: MEDICARE

## 2025-05-05 VITALS
RESPIRATION RATE: 18 BRPM | SYSTOLIC BLOOD PRESSURE: 131 MMHG | HEART RATE: 78 BPM | WEIGHT: 157 LBS | TEMPERATURE: 98.2 F | BODY MASS INDEX: 24.64 KG/M2 | OXYGEN SATURATION: 99 % | HEIGHT: 67 IN | DIASTOLIC BLOOD PRESSURE: 71 MMHG

## 2025-05-05 VITALS
DIASTOLIC BLOOD PRESSURE: 70 MMHG | SYSTOLIC BLOOD PRESSURE: 124 MMHG | RESPIRATION RATE: 16 BRPM | OXYGEN SATURATION: 96 % | HEIGHT: 69 IN | HEART RATE: 74 BPM | WEIGHT: 155 LBS | BODY MASS INDEX: 22.96 KG/M2

## 2025-05-05 DIAGNOSIS — J38.1 REINKE'S EDEMA OF VOCAL FOLDS: ICD-10-CM

## 2025-05-05 DIAGNOSIS — R22.1 NECK MASS: Primary | ICD-10-CM

## 2025-05-05 DIAGNOSIS — J04.0 LARYNGITIS: ICD-10-CM

## 2025-05-05 DIAGNOSIS — M79.89 LEFT ARM SWELLING: Primary | ICD-10-CM

## 2025-05-05 LAB — ECHO BSA: 1.83 M2

## 2025-05-05 PROCEDURE — 99284 EMERGENCY DEPT VISIT MOD MDM: CPT

## 2025-05-05 PROCEDURE — 3074F SYST BP LT 130 MM HG: CPT | Performed by: STUDENT IN AN ORGANIZED HEALTH CARE EDUCATION/TRAINING PROGRAM

## 2025-05-05 PROCEDURE — 99204 OFFICE O/P NEW MOD 45 MIN: CPT | Performed by: STUDENT IN AN ORGANIZED HEALTH CARE EDUCATION/TRAINING PROGRAM

## 2025-05-05 PROCEDURE — 31575 DIAGNOSTIC LARYNGOSCOPY: CPT | Performed by: STUDENT IN AN ORGANIZED HEALTH CARE EDUCATION/TRAINING PROGRAM

## 2025-05-05 PROCEDURE — 3078F DIAST BP <80 MM HG: CPT | Performed by: STUDENT IN AN ORGANIZED HEALTH CARE EDUCATION/TRAINING PROGRAM

## 2025-05-05 PROCEDURE — 93971 EXTREMITY STUDY: CPT

## 2025-05-05 RX ORDER — OMEPRAZOLE 40 MG/1
40 CAPSULE, DELAYED RELEASE ORAL
Qty: 90 CAPSULE | Refills: 1 | Status: SHIPPED | OUTPATIENT
Start: 2025-05-05

## 2025-05-05 ASSESSMENT — PAIN DESCRIPTION - LOCATION: LOCATION: ARM

## 2025-05-05 ASSESSMENT — PAIN DESCRIPTION - ORIENTATION: ORIENTATION: LEFT

## 2025-05-05 ASSESSMENT — PAIN SCALES - GENERAL: PAINLEVEL_OUTOF10: 3

## 2025-05-05 ASSESSMENT — ENCOUNTER SYMPTOMS
DIARRHEA: 0
EYE PAIN: 0
SHORTNESS OF BREATH: 0
VOMITING: 0
SORE THROAT: 0
COUGH: 0
ABDOMINAL PAIN: 0
NAUSEA: 0

## 2025-05-05 ASSESSMENT — PAIN - FUNCTIONAL ASSESSMENT: PAIN_FUNCTIONAL_ASSESSMENT: 0-10

## 2025-05-05 NOTE — DISCHARGE INSTRUCTIONS
Thank you for allowing us to provide you with medical care today.  We realize that you have many choices for your emergency care needs.  We thank you for choosing [facility].  Please choose us in the future for any continued health care needs.     The exam and treatment you received in the Emergency Department were for an emergent problem and are not intended as complete care. It is important that you follow up with a doctor, nurse practitioner, or physician assistant for ongoing care. If your symptoms worsen or you do not improve as expected and you are unable to reach your usual health care provider, you should return to the Emergency Department. We are available 24 hours a day.     Please make an appointment with your healthcare provider(s) for follow up of your Emergency Department visit.  Take this sheet with you when you go to your follow-up visit.    Continue to monitor symptoms at home.  Follow-up with PCP and return with any changes or worsening.

## 2025-05-05 NOTE — ED TRIAGE NOTES
Pt reports swelling to left arm for the past few days. Pt states he gets Botox injections in his arm to help muscles rigidity d/t stroke.   Denies any trauma or injury.  Patient arrives to ED ambulatory w/o difficulty. No acute distress noted in triage. A&O x 4. Skin is warm, dry & intact on obs.

## 2025-05-05 NOTE — ED PROVIDER NOTES
left arm swelling.  Vital signs stable in triage patient afebrile, heart rate at 78 bpm and oxygen saturation 99% on room air.  Physical exam notable for left arm swelling without erythema or warmth noted.  Ordered venous vascular duplex of left upper extremity which showed no DVT.  Do not suspect cellulitis at this time given no evidence of erythema or warmth.  No evidence of acute, emergent cause of patient's symptoms.    Results and findings have been communicated and explained thoroughly to patient and family members. Patient has been educated on strict return precautions as well as instructions for conservative care and follow-up. Patient verbalizes understanding and no socioeconomic barriers to care were identified during this visit. Patient expresses no further concerns at this time and will be discharged with AVS and education paperwork.               REASSESSMENT            CONSULTS:  None    PROCEDURES:  Unless otherwise noted below, none     Procedures      FINAL IMPRESSION      1. Left arm swelling          DISPOSITION/PLAN   DISPOSITION Decision To Discharge 05/05/2025 04:18:07 PM      PATIENT REFERRED TO:  Chacho Matt MD  [address] [facility] [address]  [place] [id number] [phone]    Schedule an appointment as soon as possible for a visit   As follow up in next week    [facility]  [address]  [place] [address]  [phone]  Go to   If symptoms worsen or change      DISCHARGE MEDICATIONS:  New Prescriptions    No medications on file         (Please note that portions of this note were completed with a voice recognition program.  Efforts were made to edit the dictations but occasionally words are mis-transcribed.)    REID BOSCH (electronically signed)  Emergency Attending Physician / Physician Assistant / Nurse Practitioner              Reena Bradford PA  05/05/25 0246

## 2025-05-05 NOTE — PROGRESS NOTES
unremarkable. Floor of mouth soft.   Neck: Supple.  Palpable left level 2/3 neck lymphadenopathy.  Thyroid unremarkable. Palpable laryngeal landmarks. Full neck range of motion.   Neurologic: CN II - XI intact. Normal gait     PROCEDURE: FLEXIBLE FIBEROPTIC LARYNGOSCOPY (CPT 10136)    Preoperative Diagnosis: dysphonia, neck mass     Postoperative Diagnosis: same    Procedure: Flexible Fiberoptic Laryngoscopy    Anesthesia: Topical 4% Lidocaine, Oxymetazoline    Description of Procedure: Verbal informed consent was obtained. After application of topical anesthetic and decongestant, the flexible fiberoptic endoscope was introduced to the patient's nare. It was passed through the nose and into the pharynx. The scope was then withdrawn and repeated on the opposing nare. The patient tolerated the procedure well.     Findings:   Nasal Cavity: Normal nasal cavity, no polyposis or purulence.  Middle meatus clear bilaterally.   Nasopharynx: No overt masses or lesions.   Base of tongue: Vallecula & epiglottis unremarkable. Clear pyriform sinus.   TVC: Vocal folds with erythema and mild edema - consistent with randell's edema. Normal mobility.   Subglottis: Visualized subglottis appears patent.     Assessment/Plan:   Assessment:   Jesus Eastman is a 59 y.o. male with left neck mass.  Scope exam largely normal.  No overt laryngeal or oropharyngeal cancer noted.    Given patient's significant smoking history and palpable lymphadenopathy, concern for malignancy is still high.    Plan:   CT soft tissue neck with contrast ordered  PPI ordered to reduce laryngeal irritation from reflux     Orders Placed This Encounter    LARYNGOSCOPY,FLEX FIBER,DIAGNOSTIC    CT SOFT TISSUE NECK W CONTRAST     Standing Status:   Future     Expected Date:   5/5/2025     Expiration Date:   5/5/2026     Additional Contrast?:   None     STAT Creatinine as needed::   Yes     Reason for exam::   neck mass    omeprazole (PRILOSEC) 40 MG delayed release

## 2025-05-06 RX ORDER — MULTIVITAMIN
1 TABLET ORAL DAILY
Qty: 30 TABLET | Refills: 5 | Status: SHIPPED | OUTPATIENT
Start: 2025-05-06

## 2025-05-11 DIAGNOSIS — E11.9 TYPE 2 DIABETES MELLITUS WITHOUT COMPLICATION, WITHOUT LONG-TERM CURRENT USE OF INSULIN (HCC): ICD-10-CM

## 2025-05-12 DIAGNOSIS — E11.9 TYPE 2 DIABETES MELLITUS WITHOUT COMPLICATION, WITHOUT LONG-TERM CURRENT USE OF INSULIN (HCC): ICD-10-CM

## 2025-05-12 RX ORDER — PIOGLITAZONE 15 MG/1
15 TABLET ORAL DAILY
Qty: 30 TABLET | Refills: 0 | OUTPATIENT
Start: 2025-05-12

## 2025-05-12 RX ORDER — PIOGLITAZONE 15 MG/1
15 TABLET ORAL DAILY
Qty: 90 TABLET | Refills: 0 | Status: SHIPPED | OUTPATIENT
Start: 2025-05-12

## 2025-05-13 RX ORDER — METOPROLOL TARTRATE 25 MG/1
25 TABLET, FILM COATED ORAL 2 TIMES DAILY
Qty: 180 TABLET | Refills: 0 | Status: SHIPPED | OUTPATIENT
Start: 2025-05-13

## 2025-05-21 RX ORDER — AMLODIPINE BESYLATE 2.5 MG/1
2.5 TABLET ORAL DAILY
Qty: 90 TABLET | Refills: 0 | Status: SHIPPED | OUTPATIENT
Start: 2025-05-21

## 2025-05-21 RX ORDER — CYCLOBENZAPRINE HCL 10 MG
10 TABLET ORAL 3 TIMES DAILY PRN
Qty: 90 TABLET | Refills: 0 | Status: SHIPPED | OUTPATIENT
Start: 2025-05-21

## 2025-05-22 ENCOUNTER — HOSPITAL ENCOUNTER (OUTPATIENT)
Facility: HOSPITAL | Age: 60
Discharge: HOME | End: 2025-05-25
Attending: STUDENT IN AN ORGANIZED HEALTH CARE EDUCATION/TRAINING PROGRAM
Payer: MEDICARE

## 2025-05-22 DIAGNOSIS — R22.1 NECK MASS: ICD-10-CM

## 2025-05-22 PROCEDURE — 6360000004 HC RX CONTRAST MEDICATION: Performed by: STUDENT IN AN ORGANIZED HEALTH CARE EDUCATION/TRAINING PROGRAM

## 2025-05-22 PROCEDURE — 70491 CT SOFT TISSUE NECK W/DYE: CPT

## 2025-05-22 RX ORDER — IOPAMIDOL 755 MG/ML
100 INJECTION, SOLUTION INTRAVASCULAR
Status: COMPLETED | OUTPATIENT
Start: 2025-05-22 | End: 2025-05-22

## 2025-05-22 RX ADMIN — IOPAMIDOL 90 ML: 755 INJECTION, SOLUTION INTRAVENOUS at 17:22

## 2025-06-03 RX ORDER — GEMFIBROZIL 600 MG/1
600 TABLET, FILM COATED ORAL 2 TIMES DAILY
Qty: 180 TABLET | Refills: 0 | Status: SHIPPED | OUTPATIENT
Start: 2025-06-03

## 2025-06-16 ENCOUNTER — OFFICE VISIT (OUTPATIENT)
Age: 60
End: 2025-06-16
Payer: MEDICARE

## 2025-06-16 VITALS
HEART RATE: 66 BPM | WEIGHT: 157 LBS | SYSTOLIC BLOOD PRESSURE: 120 MMHG | BODY MASS INDEX: 24.64 KG/M2 | OXYGEN SATURATION: 95 % | DIASTOLIC BLOOD PRESSURE: 76 MMHG | HEIGHT: 67 IN

## 2025-06-16 DIAGNOSIS — R13.10 DYSPHAGIA, UNSPECIFIED TYPE: ICD-10-CM

## 2025-06-16 DIAGNOSIS — J38.1 REINKE'S EDEMA OF VOCAL FOLDS: Primary | ICD-10-CM

## 2025-06-16 DIAGNOSIS — J43.9 PULMONARY EMPHYSEMA, UNSPECIFIED EMPHYSEMA TYPE (HCC): ICD-10-CM

## 2025-06-16 PROCEDURE — 99214 OFFICE O/P EST MOD 30 MIN: CPT | Performed by: STUDENT IN AN ORGANIZED HEALTH CARE EDUCATION/TRAINING PROGRAM

## 2025-06-16 PROCEDURE — 3074F SYST BP LT 130 MM HG: CPT | Performed by: STUDENT IN AN ORGANIZED HEALTH CARE EDUCATION/TRAINING PROGRAM

## 2025-06-16 PROCEDURE — 3078F DIAST BP <80 MM HG: CPT | Performed by: STUDENT IN AN ORGANIZED HEALTH CARE EDUCATION/TRAINING PROGRAM

## 2025-06-16 NOTE — PROGRESS NOTES
Subjective:   Jesus Eastman   59 y.o.   1965     Refered by: No referring provider defined for this encounter.     New Patient Visit  Chief Complaint: neck mass    History of Present Illness:  Jesus Eastman is a 59 y.o. male with past medical history of CAD, CVA, HTN, MI, psychiatric disorder, DM, who presents today for evaluation of neck mass.     Patient reports that he has had dysphonia for the last 3 months as well as a left-sided neck mass.  Patient is a significant smoker, approximately 2 packs/day for the last 20+ years.  Denies any significant associated weight loss recently.    Interval Hx:   6/16/25:   CT completed, no LAD or masses seen. Patient continues to have dysphagia and dysphonia     Patient's imaging and neurology notes reviewed.    Review of Systems  Consitutional: denies fever, excessive weight gain or loss.  Eyes: denies diplopia, eye pain.  Integumentary: denies new concerning skin lesions.  Ears, Nose, Mouth, Throat: denies except as per HPI.  Endocrine: denies hot or cold intolerance, increased thirst.  Respiratory: denies cough, hemoptysis, wheezing  Gastrointestinal: denies trouble swallowing, nausea, emesis, regurgitation  Musculoskeletal: denies muscle weakness or wasting  Cardiovascular: denies chest pain, shortness of breath  Neurologic: denies seizures, numbness or tingling, syncope  Hematologic: denies easy bleeding or bruising       Past Medical History:   Diagnosis Date    CAD (coronary artery disease) 2003    MI    Cervical spondylosis 6/26/2014    Chronic pain     Chronic pain     Left Shoulder pain    Coronary artery disease due to lipid rich plaque 8/23/2018    Hypertension     MI (myocardial infarction) (HCC)     age 36    Other ill-defined conditions(799.89)     reflux sympathetic dystrophy    Psychiatric disorder     Major Depression/Anxiety    Psychotic disorder (HCC)     Seizures (HCC)     Type 2 diabetes mellitus without complication, without long-term current use

## 2025-06-25 RX ORDER — CYCLOBENZAPRINE HCL 10 MG
10 TABLET ORAL 3 TIMES DAILY PRN
Qty: 90 TABLET | Refills: 0 | Status: SHIPPED | OUTPATIENT
Start: 2025-06-25

## 2025-07-01 RX ORDER — IBUPROFEN 800 MG/1
800 TABLET, FILM COATED ORAL EVERY 8 HOURS PRN
Qty: 90 TABLET | Refills: 0 | Status: SHIPPED | OUTPATIENT
Start: 2025-07-01

## 2025-07-03 RX ORDER — GLIMEPIRIDE 2 MG/1
2 TABLET ORAL 2 TIMES DAILY
Qty: 180 TABLET | Refills: 3 | Status: SHIPPED | OUTPATIENT
Start: 2025-07-03

## 2025-07-03 RX ORDER — SITAGLIPTIN 100 MG/1
100 TABLET, FILM COATED ORAL DAILY
Qty: 90 TABLET | Refills: 3 | Status: ACTIVE | OUTPATIENT
Start: 2025-07-03

## 2025-07-16 DIAGNOSIS — E11.9 TYPE 2 DIABETES MELLITUS WITHOUT COMPLICATION, WITHOUT LONG-TERM CURRENT USE OF INSULIN (HCC): ICD-10-CM

## 2025-07-16 RX ORDER — BLOOD SUGAR DIAGNOSTIC
STRIP MISCELLANEOUS
Qty: 200 EACH | Refills: 0 | Status: SHIPPED | OUTPATIENT
Start: 2025-07-16

## 2025-07-16 RX ORDER — PIOGLITAZONE 15 MG/1
15 TABLET ORAL DAILY
Qty: 90 TABLET | Refills: 0 | Status: SHIPPED | OUTPATIENT
Start: 2025-07-16

## 2025-07-17 ENCOUNTER — TRANSCRIBE ORDERS (OUTPATIENT)
Facility: HOSPITAL | Age: 60
End: 2025-07-17

## 2025-07-17 DIAGNOSIS — Z87.891 PERSONAL HISTORY OF TOBACCO USE, PRESENTING HAZARDS TO HEALTH: Primary | ICD-10-CM

## 2025-07-28 RX ORDER — CYCLOBENZAPRINE HCL 10 MG
10 TABLET ORAL 3 TIMES DAILY PRN
Qty: 90 TABLET | Refills: 0 | Status: SHIPPED | OUTPATIENT
Start: 2025-07-28

## 2025-07-28 NOTE — ASSESSMENT & PLAN NOTE
Chronic, at goal (stable), continue current plan pending work up below    Orders:    Comprehensive Metabolic Panel; Future     Radha Trotter MD  You21 minutes ago (8:39 AM)     Pls update pt CT reviewed.  Noted lesions size unchanged from previous study.  Pls explain to pt that we did the CT multi-phase to specifically further rule out liver cancer, though pre-test probability very low given no known cirrhosis and negative AFP.  The CT multi-phase was negative for any concern of liver cancer, differential still hemangioma vs adenoma (which is the reason we asked her to dc OCP on 7/11)    Repeat MRI liver protocol in 3 months.  Recheck LFT now.    Thanks,  MAHESH

## 2025-08-04 ENCOUNTER — HOSPITAL ENCOUNTER (OUTPATIENT)
Facility: HOSPITAL | Age: 60
Discharge: HOME | End: 2025-08-07
Attending: STUDENT IN AN ORGANIZED HEALTH CARE EDUCATION/TRAINING PROGRAM
Payer: MEDICARE

## 2025-08-04 DIAGNOSIS — R13.10 DYSPHAGIA, UNSPECIFIED TYPE: ICD-10-CM

## 2025-08-04 PROCEDURE — 74230 X-RAY XM SWLNG FUNCJ C+: CPT

## 2025-08-04 PROCEDURE — 92611 MOTION FLUOROSCOPY/SWALLOW: CPT

## 2025-08-11 ENCOUNTER — OFFICE VISIT (OUTPATIENT)
Facility: CLINIC | Age: 60
End: 2025-08-11
Payer: MEDICARE

## 2025-08-11 VITALS
HEART RATE: 69 BPM | BODY MASS INDEX: 24.71 KG/M2 | TEMPERATURE: 97.4 F | HEIGHT: 67 IN | DIASTOLIC BLOOD PRESSURE: 80 MMHG | OXYGEN SATURATION: 100 % | SYSTOLIC BLOOD PRESSURE: 128 MMHG | WEIGHT: 157.4 LBS

## 2025-08-11 DIAGNOSIS — M25.561 ACUTE PAIN OF RIGHT KNEE: Primary | ICD-10-CM

## 2025-08-11 DIAGNOSIS — J06.9 ACUTE UPPER RESPIRATORY INFECTION: ICD-10-CM

## 2025-08-11 DIAGNOSIS — J02.9 SORE THROAT: ICD-10-CM

## 2025-08-11 DIAGNOSIS — E11.9 TYPE 2 DIABETES MELLITUS WITHOUT COMPLICATION, WITHOUT LONG-TERM CURRENT USE OF INSULIN (HCC): ICD-10-CM

## 2025-08-11 DIAGNOSIS — G62.9 PERIPHERAL POLYNEUROPATHY: ICD-10-CM

## 2025-08-11 PROCEDURE — 3046F HEMOGLOBIN A1C LEVEL >9.0%: CPT | Performed by: NURSE PRACTITIONER

## 2025-08-11 PROCEDURE — 3074F SYST BP LT 130 MM HG: CPT | Performed by: NURSE PRACTITIONER

## 2025-08-11 PROCEDURE — 99214 OFFICE O/P EST MOD 30 MIN: CPT | Performed by: NURSE PRACTITIONER

## 2025-08-11 PROCEDURE — 3079F DIAST BP 80-89 MM HG: CPT | Performed by: NURSE PRACTITIONER

## 2025-08-11 RX ORDER — FUROSEMIDE 20 MG/1
20 TABLET ORAL DAILY
Qty: 90 TABLET | Refills: 1 | Status: SHIPPED | OUTPATIENT
Start: 2025-08-11

## 2025-08-11 RX ORDER — OMEPRAZOLE 40 MG/1
40 CAPSULE, DELAYED RELEASE ORAL
Qty: 90 CAPSULE | Refills: 1 | Status: SHIPPED | OUTPATIENT
Start: 2025-08-11

## 2025-08-11 RX ORDER — CARBINOXAMINE MALEATE 4 MG/1
1 TABLET ORAL 2 TIMES DAILY
Qty: 60 TABLET | Refills: 5 | Status: SHIPPED | OUTPATIENT
Start: 2025-08-11

## 2025-08-11 RX ORDER — GABAPENTIN 300 MG/1
300 CAPSULE ORAL NIGHTLY
Qty: 90 CAPSULE | Refills: 0 | Status: SHIPPED | OUTPATIENT
Start: 2025-08-11 | End: 2025-11-09

## 2025-08-11 RX ORDER — PANTOPRAZOLE SODIUM 40 MG/1
40 TABLET, DELAYED RELEASE ORAL DAILY
Qty: 90 TABLET | Refills: 1 | Status: SHIPPED | OUTPATIENT
Start: 2025-08-11 | End: 2026-02-07

## 2025-08-11 RX ORDER — METOPROLOL TARTRATE 25 MG/1
25 TABLET, FILM COATED ORAL 2 TIMES DAILY
Qty: 180 TABLET | Refills: 2 | Status: SHIPPED | OUTPATIENT
Start: 2025-08-11

## 2025-08-11 RX ORDER — CYCLOBENZAPRINE HCL 10 MG
10 TABLET ORAL 3 TIMES DAILY PRN
Qty: 90 TABLET | Refills: 0 | Status: SHIPPED | OUTPATIENT
Start: 2025-08-11

## 2025-08-11 RX ORDER — POTASSIUM CHLORIDE 1500 MG/1
20 TABLET, EXTENDED RELEASE ORAL DAILY
Qty: 30 TABLET | Refills: 5 | Status: CANCELLED | OUTPATIENT
Start: 2025-08-11

## 2025-08-11 RX ORDER — DULOXETIN HYDROCHLORIDE 60 MG/1
60 CAPSULE, DELAYED RELEASE ORAL 2 TIMES DAILY
Qty: 180 CAPSULE | Refills: 1 | Status: SHIPPED | OUTPATIENT
Start: 2025-08-11 | End: 2026-02-07

## 2025-08-11 RX ORDER — EPINEPHRINE 0.3 MG/.3ML
INJECTION SUBCUTANEOUS
Qty: 2 ML | Refills: 11 | Status: SHIPPED | OUTPATIENT
Start: 2025-08-11

## 2025-08-11 RX ORDER — MULTIVIT,CALC,MINS/IRON/FOLIC 9MG-400MCG
1 TABLET ORAL DAILY
Qty: 30 TABLET | Refills: 11 | Status: SHIPPED | OUTPATIENT
Start: 2025-08-11

## 2025-08-11 RX ORDER — METFORMIN HYDROCHLORIDE 500 MG/1
500 TABLET, EXTENDED RELEASE ORAL 3 TIMES DAILY
Qty: 213 TABLET | Refills: 1 | Status: SHIPPED | OUTPATIENT
Start: 2025-08-11

## 2025-08-11 RX ORDER — BLOOD SUGAR DIAGNOSTIC
1 STRIP MISCELLANEOUS DAILY
Qty: 200 EACH | Refills: 5 | Status: SHIPPED | OUTPATIENT
Start: 2025-08-11

## 2025-08-11 RX ORDER — ATORVASTATIN CALCIUM 80 MG/1
80 TABLET, FILM COATED ORAL DAILY
Qty: 90 TABLET | Refills: 1 | Status: SHIPPED | OUTPATIENT
Start: 2025-08-11 | End: 2026-02-07

## 2025-08-11 RX ORDER — IBUPROFEN 800 MG/1
800 TABLET, FILM COATED ORAL EVERY 8 HOURS PRN
Qty: 90 TABLET | Refills: 0 | Status: SHIPPED | OUTPATIENT
Start: 2025-08-11 | End: 2025-08-20

## 2025-08-11 RX ORDER — MULTIVITAMIN
1 TABLET ORAL DAILY
Qty: 30 TABLET | Refills: 5 | Status: CANCELLED | OUTPATIENT
Start: 2025-08-11

## 2025-08-11 RX ORDER — AMLODIPINE BESYLATE 2.5 MG/1
2.5 TABLET ORAL DAILY
Qty: 90 TABLET | Refills: 1 | Status: SHIPPED | OUTPATIENT
Start: 2025-08-11

## 2025-08-11 RX ORDER — HYDROXYZINE HYDROCHLORIDE 25 MG/1
25 TABLET, FILM COATED ORAL EVERY 6 HOURS PRN
Qty: 90 TABLET | Refills: 1 | Status: SHIPPED | OUTPATIENT
Start: 2025-08-11

## 2025-08-11 RX ORDER — ASPIRIN 81 MG/1
81 TABLET ORAL DAILY
Qty: 90 TABLET | Refills: 1 | Status: SHIPPED | OUTPATIENT
Start: 2025-08-11

## 2025-08-11 RX ORDER — PIOGLITAZONE 15 MG/1
15 TABLET ORAL DAILY
Qty: 90 TABLET | Refills: 1 | Status: SHIPPED | OUTPATIENT
Start: 2025-08-11

## 2025-08-11 RX ORDER — LORATADINE 10 MG/1
10 TABLET ORAL DAILY
Qty: 90 TABLET | Refills: 1 | Status: SHIPPED | OUTPATIENT
Start: 2025-08-11

## 2025-08-11 RX ORDER — GEMFIBROZIL 600 MG/1
600 TABLET, FILM COATED ORAL 2 TIMES DAILY
Qty: 180 TABLET | Refills: 0 | Status: SHIPPED | OUTPATIENT
Start: 2025-08-11

## 2025-08-11 RX ORDER — GLIMEPIRIDE 2 MG/1
2 TABLET ORAL 2 TIMES DAILY
Qty: 180 TABLET | Refills: 3 | Status: SHIPPED | OUTPATIENT
Start: 2025-08-11

## 2025-08-11 RX ORDER — POTASSIUM CHLORIDE 1500 MG/1
20 TABLET, EXTENDED RELEASE ORAL DAILY
Qty: 90 TABLET | Refills: 1 | Status: SHIPPED | OUTPATIENT
Start: 2025-08-11

## 2025-08-12 ENCOUNTER — RESULTS FOLLOW-UP (OUTPATIENT)
Facility: CLINIC | Age: 60
End: 2025-08-12

## 2025-08-12 DIAGNOSIS — E11.9 TYPE 2 DIABETES MELLITUS WITHOUT COMPLICATION, WITHOUT LONG-TERM CURRENT USE OF INSULIN (HCC): ICD-10-CM

## 2025-08-12 LAB
ALBUMIN SERPL-MCNC: 4 G/DL (ref 3.5–5.2)
ALBUMIN/GLOB SERPL: 1.4 (ref 1.1–2.2)
ALP SERPL-CCNC: 121 U/L (ref 40–129)
ALT SERPL-CCNC: 17 U/L (ref 10–50)
ANION GAP SERPL CALC-SCNC: 12 MMOL/L (ref 2–14)
AST SERPL-CCNC: 12 U/L (ref 10–50)
BILIRUB SERPL-MCNC: <0.2 MG/DL (ref 0–1.2)
BUN SERPL-MCNC: 19 MG/DL (ref 8–23)
BUN/CREAT SERPL: 16 (ref 12–20)
CALCIUM SERPL-MCNC: 9.5 MG/DL (ref 8.8–10.2)
CHLORIDE SERPL-SCNC: 102 MMOL/L (ref 98–107)
CO2 SERPL-SCNC: 23 MMOL/L (ref 20–29)
CREAT SERPL-MCNC: 1.16 MG/DL (ref 0.7–1.2)
EST. AVERAGE GLUCOSE BLD GHB EST-MCNC: 292 MG/DL
GLOBULIN SER CALC-MCNC: 2.8 G/DL (ref 2–4)
GLUCOSE SERPL-MCNC: 295 MG/DL (ref 65–100)
HBA1C MFR BLD: 11.8 % (ref 4–5.6)
POTASSIUM SERPL-SCNC: 4.9 MMOL/L (ref 3.5–5.1)
PROT SERPL-MCNC: 6.8 G/DL (ref 6.4–8.3)
SODIUM SERPL-SCNC: 137 MMOL/L (ref 136–145)

## 2025-08-20 RX ORDER — IBUPROFEN 800 MG/1
800 TABLET, FILM COATED ORAL EVERY 8 HOURS PRN
Qty: 90 TABLET | Refills: 0 | Status: SHIPPED | OUTPATIENT
Start: 2025-08-20

## 2025-08-21 ENCOUNTER — OFFICE VISIT (OUTPATIENT)
Age: 60
End: 2025-08-21
Payer: MEDICARE

## 2025-08-21 DIAGNOSIS — M17.11 UNILATERAL PRIMARY OSTEOARTHRITIS, RIGHT KNEE: ICD-10-CM

## 2025-08-21 DIAGNOSIS — M25.561 RIGHT KNEE PAIN, UNSPECIFIED CHRONICITY: Primary | ICD-10-CM

## 2025-08-21 PROCEDURE — 20610 DRAIN/INJ JOINT/BURSA W/O US: CPT | Performed by: ORTHOPAEDIC SURGERY

## 2025-08-21 PROCEDURE — 99203 OFFICE O/P NEW LOW 30 MIN: CPT | Performed by: ORTHOPAEDIC SURGERY

## 2025-08-21 RX ORDER — BETAMETHASONE SODIUM PHOSPHATE AND BETAMETHASONE ACETATE 3; 3 MG/ML; MG/ML
6 INJECTION, SUSPENSION INTRA-ARTICULAR; INTRALESIONAL; INTRAMUSCULAR; SOFT TISSUE ONCE
Status: COMPLETED | OUTPATIENT
Start: 2025-08-21 | End: 2025-08-21

## 2025-08-21 RX ADMIN — BETAMETHASONE SODIUM PHOSPHATE AND BETAMETHASONE ACETATE 6 MG: 3; 3 INJECTION, SUSPENSION INTRA-ARTICULAR; INTRALESIONAL; INTRAMUSCULAR; SOFT TISSUE at 13:51

## 2025-08-21 ASSESSMENT — PATIENT HEALTH QUESTIONNAIRE - PHQ9
SUM OF ALL RESPONSES TO PHQ QUESTIONS 1-9: 2
1. LITTLE INTEREST OR PLEASURE IN DOING THINGS: SEVERAL DAYS
SUM OF ALL RESPONSES TO PHQ QUESTIONS 1-9: 2
SUM OF ALL RESPONSES TO PHQ QUESTIONS 1-9: 2
2. FEELING DOWN, DEPRESSED OR HOPELESS: SEVERAL DAYS
SUM OF ALL RESPONSES TO PHQ QUESTIONS 1-9: 2

## 2025-09-03 RX ORDER — POTASSIUM CHLORIDE 1500 MG/1
20 TABLET, EXTENDED RELEASE ORAL DAILY
Qty: 30 TABLET | Refills: 0 | Status: SHIPPED | OUTPATIENT
Start: 2025-09-03